# Patient Record
Sex: MALE | Race: WHITE | NOT HISPANIC OR LATINO | Employment: UNEMPLOYED
[De-identification: names, ages, dates, MRNs, and addresses within clinical notes are randomized per-mention and may not be internally consistent; named-entity substitution may affect disease eponyms.]

---

## 2017-06-16 ENCOUNTER — HOSPITAL (OUTPATIENT)
Dept: OTHER | Age: 62
End: 2017-06-16
Attending: HOSPITALIST

## 2017-06-16 LAB
ANALYZER ANC (IANC): NORMAL
ANION GAP SERPL CALC-SCNC: 13 MMOL/L (ref 10–20)
BASOPHILS # BLD: 0 THOUSAND/MCL (ref 0–0.3)
BASOPHILS NFR BLD: 1 %
BUN SERPL-MCNC: 11 MG/DL (ref 6–20)
BUN/CREAT SERPL: 16 (ref 7–25)
CALCIUM SERPL-MCNC: 9.4 MG/DL (ref 8.4–10.2)
CHLORIDE: 97 MMOL/L (ref 98–107)
CO2 SERPL-SCNC: 29 MMOL/L (ref 21–32)
CREAT SERPL-MCNC: 0.68 MG/DL (ref 0.67–1.17)
CRP SERPL-MCNC: 2.8 MG/DL
DIFFERENTIAL METHOD BLD: NORMAL
EOSINOPHIL # BLD: 0.3 THOUSAND/MCL (ref 0.1–0.5)
EOSINOPHIL NFR BLD: 4 %
ERYTHROCYTE [DISTWIDTH] IN BLOOD: 14.9 % (ref 11–15)
ERYTHROCYTE [SEDIMENTATION RATE] IN BLOOD BY WESTERGREN METHOD: 89 MM/HR (ref 0–15)
GLUCOSE BLDC GLUCOMTR-MCNC: 136 MG/DL (ref 65–99)
GLUCOSE BLDC GLUCOMTR-MCNC: 155 MG/DL (ref 65–99)
GLUCOSE SERPL-MCNC: 181 MG/DL (ref 65–99)
HEMATOCRIT: 39.4 % (ref 39–51)
HGB BLD-MCNC: 13.1 GM/DL (ref 13–17)
LYMPHOCYTES # BLD: 1.7 THOUSAND/MCL (ref 1–4)
LYMPHOCYTES NFR BLD: 27 %
MAGNESIUM SERPL-MCNC: 1.9 MG/DL (ref 1.7–2.4)
MCH RBC QN AUTO: 26.4 PG (ref 26–34)
MCHC RBC AUTO-ENTMCNC: 33.2 GM/DL (ref 32–36.5)
MCV RBC AUTO: 79.3 FL (ref 78–100)
MONOCYTES # BLD: 0.6 THOUSAND/MCL (ref 0.3–0.9)
MONOCYTES NFR BLD: 9 %
NEUTROPHILS # BLD: 3.9 THOUSAND/MCL (ref 1.8–7.7)
NEUTROPHILS NFR BLD: 59 %
NEUTS SEG NFR BLD: NORMAL %
PERCENT NRBC: NORMAL
PLATELET # BLD: 329 THOUSAND/MCL (ref 140–450)
POTASSIUM SERPL-SCNC: 4.4 MMOL/L (ref 3.4–5.1)
RBC # BLD: 4.97 MILLION/MCL (ref 4.5–5.9)
SODIUM SERPL-SCNC: 135 MMOL/L (ref 135–145)
WBC # BLD: 6.5 THOUSAND/MCL (ref 4.2–11)

## 2017-06-17 LAB
ANALYZER ANC (IANC): ABNORMAL
ANION GAP SERPL CALC-SCNC: 10 MMOL/L (ref 10–20)
BASOPHILS # BLD: 0 THOUSAND/MCL (ref 0–0.3)
BASOPHILS NFR BLD: 1 %
BUN SERPL-MCNC: 11 MG/DL (ref 6–20)
BUN/CREAT SERPL: 14 (ref 7–25)
CALCIUM SERPL-MCNC: 9 MG/DL (ref 8.4–10.2)
CHLORIDE: 100 MMOL/L (ref 98–107)
CO2 SERPL-SCNC: 29 MMOL/L (ref 21–32)
CREAT SERPL-MCNC: 0.78 MG/DL (ref 0.67–1.17)
DIFFERENTIAL METHOD BLD: ABNORMAL
EOSINOPHIL # BLD: 0.2 THOUSAND/MCL (ref 0.1–0.5)
EOSINOPHIL NFR BLD: 3 %
ERYTHROCYTE [DISTWIDTH] IN BLOOD: 15 % (ref 11–15)
GLUCOSE BLDC GLUCOMTR-MCNC: 116 MG/DL (ref 65–99)
GLUCOSE BLDC GLUCOMTR-MCNC: 152 MG/DL (ref 65–99)
GLUCOSE BLDC GLUCOMTR-MCNC: 209 MG/DL (ref 65–99)
GLUCOSE BLDC GLUCOMTR-MCNC: 248 MG/DL (ref 65–99)
GLUCOSE BLDC GLUCOMTR-MCNC: 261 MG/DL (ref 65–99)
GLUCOSE BLDC GLUCOMTR-MCNC: 90 MG/DL (ref 65–99)
GLUCOSE SERPL-MCNC: 180 MG/DL (ref 65–99)
HEMATOCRIT: 39 % (ref 39–51)
HGB BLD-MCNC: 12.8 GM/DL (ref 13–17)
LYMPHOCYTES # BLD: 1.3 THOUSAND/MCL (ref 1–4)
LYMPHOCYTES NFR BLD: 16 %
MCH RBC QN AUTO: 26.2 PG (ref 26–34)
MCHC RBC AUTO-ENTMCNC: 32.8 GM/DL (ref 32–36.5)
MCV RBC AUTO: 79.9 FL (ref 78–100)
MONOCYTES # BLD: 0.4 THOUSAND/MCL (ref 0.3–0.9)
MONOCYTES NFR BLD: 5 %
NEUTROPHILS # BLD: 6.2 THOUSAND/MCL (ref 1.8–7.7)
NEUTROPHILS NFR BLD: 75 %
NEUTS SEG NFR BLD: ABNORMAL %
PERCENT NRBC: ABNORMAL
PLATELET # BLD: 283 THOUSAND/MCL (ref 140–450)
POTASSIUM SERPL-SCNC: 4.3 MMOL/L (ref 3.4–5.1)
RBC # BLD: 4.88 MILLION/MCL (ref 4.5–5.9)
SODIUM SERPL-SCNC: 135 MMOL/L (ref 135–145)
VANCOMYCIN TROUGH SERPL-MCNC: 21.6 MCG/ML (ref 10–20)
WBC # BLD: 8.1 THOUSAND/MCL (ref 4.2–11)

## 2017-06-18 ENCOUNTER — CHARTING TRANS (OUTPATIENT)
Dept: OTHER | Age: 62
End: 2017-06-18

## 2017-06-18 LAB
GLUCOSE BLDC GLUCOMTR-MCNC: 175 MG/DL (ref 65–99)
GLUCOSE BLDC GLUCOMTR-MCNC: 177 MG/DL (ref 65–99)
GLUCOSE BLDC GLUCOMTR-MCNC: 208 MG/DL (ref 65–99)
GLUCOSE BLDC GLUCOMTR-MCNC: 223 MG/DL (ref 65–99)
GLUCOSE BLDC GLUCOMTR-MCNC: 237 MG/DL (ref 65–99)

## 2017-06-19 LAB
GLUCOSE BLDC GLUCOMTR-MCNC: 150 MG/DL (ref 65–99)
GLUCOSE BLDC GLUCOMTR-MCNC: 158 MG/DL (ref 65–99)
GLUCOSE BLDC GLUCOMTR-MCNC: 183 MG/DL (ref 65–99)
GLUCOSE BLDC GLUCOMTR-MCNC: 199 MG/DL (ref 65–99)
GLUCOSE BLDC GLUCOMTR-MCNC: 260 MG/DL (ref 65–99)

## 2017-06-20 LAB
ANALYZER ANC (IANC): ABNORMAL
ANION GAP SERPL CALC-SCNC: 9 MMOL/L (ref 10–20)
BASOPHILS # BLD: 0.1 THOUSAND/MCL (ref 0–0.3)
BASOPHILS NFR BLD: 1 %
BUN SERPL-MCNC: 11 MG/DL (ref 6–20)
BUN/CREAT SERPL: 12 (ref 7–25)
CALCIUM SERPL-MCNC: 8.9 MG/DL (ref 8.4–10.2)
CHLORIDE: 102 MMOL/L (ref 98–107)
CO2 SERPL-SCNC: 31 MMOL/L (ref 21–32)
CREAT SERPL-MCNC: 0.89 MG/DL (ref 0.67–1.17)
DIFFERENTIAL METHOD BLD: ABNORMAL
EOSINOPHIL # BLD: 0.4 THOUSAND/MCL (ref 0.1–0.5)
EOSINOPHIL NFR BLD: 6 %
ERYTHROCYTE [DISTWIDTH] IN BLOOD: 14.8 % (ref 11–15)
GLUCOSE BLDC GLUCOMTR-MCNC: 191 MG/DL (ref 65–99)
GLUCOSE BLDC GLUCOMTR-MCNC: 201 MG/DL (ref 65–99)
GLUCOSE BLDC GLUCOMTR-MCNC: 211 MG/DL (ref 65–99)
GLUCOSE BLDC GLUCOMTR-MCNC: 228 MG/DL (ref 65–99)
GLUCOSE SERPL-MCNC: 229 MG/DL (ref 65–99)
HEMATOCRIT: 35.5 % (ref 39–51)
HGB BLD-MCNC: 11.5 GM/DL (ref 13–17)
LYMPHOCYTES # BLD: 2.3 THOUSAND/MCL (ref 1–4)
LYMPHOCYTES NFR BLD: 36 %
MCH RBC QN AUTO: 25.8 PG (ref 26–34)
MCHC RBC AUTO-ENTMCNC: 32.4 GM/DL (ref 32–36.5)
MCV RBC AUTO: 79.8 FL (ref 78–100)
MONOCYTES # BLD: 0.5 THOUSAND/MCL (ref 0.3–0.9)
MONOCYTES NFR BLD: 8 %
NEUTROPHILS # BLD: 3.2 THOUSAND/MCL (ref 1.8–7.7)
NEUTROPHILS NFR BLD: 49 %
NEUTS SEG NFR BLD: ABNORMAL %
PERCENT NRBC: ABNORMAL
PLATELET # BLD: 266 THOUSAND/MCL (ref 140–450)
POTASSIUM SERPL-SCNC: 3.9 MMOL/L (ref 3.4–5.1)
RBC # BLD: 4.45 MILLION/MCL (ref 4.5–5.9)
SODIUM SERPL-SCNC: 138 MMOL/L (ref 135–145)
WBC # BLD: 6.4 THOUSAND/MCL (ref 4.2–11)

## 2017-08-01 PROBLEM — M86.172 OSTEOMYELITIS OF ANKLE OR FOOT, ACUTE, LEFT (HCC): Status: ACTIVE | Noted: 2017-08-01

## 2017-09-06 ENCOUNTER — OFFICE VISIT (OUTPATIENT)
Dept: WOUND CARE | Facility: HOSPITAL | Age: 62
End: 2017-09-06
Attending: NURSE PRACTITIONER
Payer: MEDICAID

## 2017-09-06 DIAGNOSIS — L97.321 NON-PRESSURE CHRONIC ULCER OF LEFT ANKLE, LIMITED TO BREAKDOWN OF SKIN (HCC): ICD-10-CM

## 2017-09-06 DIAGNOSIS — E66.8 MODERATE OBESITY: ICD-10-CM

## 2017-09-06 DIAGNOSIS — M14.672 CHARCOT'S JOINT OF ANKLE, LEFT: ICD-10-CM

## 2017-09-06 DIAGNOSIS — L97.509 DIABETIC FOOT ULCER (HCC): ICD-10-CM

## 2017-09-06 DIAGNOSIS — E11.621 DIABETIC FOOT ULCER (HCC): ICD-10-CM

## 2017-09-06 DIAGNOSIS — E11.42: ICD-10-CM

## 2017-09-06 DIAGNOSIS — I10 ESSENTIAL HYPERTENSION, BENIGN: ICD-10-CM

## 2017-09-06 DIAGNOSIS — L97.522 NON-PRESSURE CHRONIC ULCER OF OTHER PART OF LEFT FOOT WITH FAT LAYER EXPOSED (HCC): Primary | ICD-10-CM

## 2017-09-06 PROCEDURE — 99215 OFFICE O/P EST HI 40 MIN: CPT

## 2017-09-06 NOTE — PROGRESS NOTES
Progress Note Details  Patient Name: Amos Castillo  Date: 9/6/2017   Patient Number: [id number] Physician / Extender: Tank Ortega   Patient YOB: 1955 Facility: [facility]    Chief Complaint  This information was obtained f 9/6/17-Initial visit-61yo CM presents to wound care clinic for left heel and left ankle wound evaluation and management. PMH of HTN, DM type 2, Neuropathy, Osteomyelitis (resolved), hypertriglyceridemia, charcots foot and obesity.  Pt stated that left heel Cancer - No History, Diabetes - Father, Heart Disease - No History, Hypertension - No History, Kidney Disease - No History, Lung Disease - Mother, Mental Illness - No History, Stroke - Mother, Thyroid Problems - Mother, Tuberculosis - No History, Seizures Cardiovascular (Central/Peripheral): Dyspnea on Exertion, Intermittent Claudication, Lower extremity (leg) resting pain, Nocturnal dyspnea  Gastrointestinal (GI): Nausea / Vomiting / Diarrhea (N/V/D), Loss of Appetite, Difficulty Swallowing, Stomach/abdomi Normal respiratory effort ,without use of accessory muscles. Clear bilaterally, free of adventitious sounds. .     Cardiovascular:  RRR, S1, S2, No murmur, rubs or gallops. . Triphasic signals bilateral DP and PT. Robi LE edema L>R.      Gastrointestinal (GI): The periwound skin exhibited: Edema, Induration, Callus, Moist, Maceration, Erythema. The periwound skin did not exhibit: Excoriation, Crepitus, Fluctuance, Friable, Rash, Atrophie Yeni, Cyanosis, Ecchymosis, Hemosiderosis, Pallor, Rubor.  The temperatur (Encounter Diagnosis) L97.522 - Non-pressure chronic ulcer of other part of left foot with fat layer exposed  (Encounter Diagnosis) L97.321 - Non-pressure chronic ulcer of left ankle limited to breakdown of skin  (Encounter Diagnosis) E11.621 - Type 2 diab

## 2017-09-12 ENCOUNTER — APPOINTMENT (OUTPATIENT)
Dept: WOUND CARE | Facility: HOSPITAL | Age: 62
End: 2017-09-12
Attending: NURSE PRACTITIONER
Payer: MEDICAID

## 2017-09-13 ENCOUNTER — OFFICE VISIT (OUTPATIENT)
Dept: WOUND CARE | Facility: HOSPITAL | Age: 62
End: 2017-09-13
Attending: NURSE PRACTITIONER
Payer: MEDICAID

## 2017-09-13 DIAGNOSIS — L97.522 NON-PRESSURE CHRONIC ULCER OF OTHER PART OF LEFT FOOT WITH FAT LAYER EXPOSED (HCC): Primary | ICD-10-CM

## 2017-09-13 DIAGNOSIS — L97.509 DIABETIC FOOT ULCER (HCC): ICD-10-CM

## 2017-09-13 DIAGNOSIS — E11.621 DIABETIC FOOT ULCER (HCC): ICD-10-CM

## 2017-09-13 DIAGNOSIS — L97.321 NON-PRESSURE CHRONIC ULCER OF LEFT ANKLE, LIMITED TO BREAKDOWN OF SKIN (HCC): ICD-10-CM

## 2017-09-13 PROCEDURE — 97597 DBRDMT OPN WND 1ST 20 CM/<: CPT

## 2017-09-13 PROCEDURE — 97598 DBRDMT OPN WND ADDL 20CM/<: CPT

## 2017-09-13 NOTE — PROGRESS NOTES
Progress Note Details  Patient Name: John Ferris  Date: 9/13/2017   Patient Number: [id number] Physician / Extender: Bridgette Nunez   Patient YOB: 1955 Facility: [facility]    Chief Complaint  This information was obtained 9/6/17-Initial visit-61yo CM presents to wound care clinic for left heel and left ankle wound evaluation and management. PMH of HTN, DM type 2, Neuropathy, Osteomyelitis (resolved), hypertriglyceridemia, charcots foot and obesity.  Pt stated that left heel 9/13/17-Pt admitted [facility] after he left wound care center last visit and ws discharged 3 days later. Pt did not bring discharge documents with him.  9/6/17-Wound culture showed staph aureus and corneybacterium while he was at [facility] and pt stated Cardiovascular (Central/Peripheral): Dyspnea on Exertion, Intermittent Claudication, Lower extremity (leg) resting pain  Gastrointestinal (GI): Nausea / Vomiting / Diarrhea (N/V/D), Difficulty Swallowing  Genitourinary (): Frequency  Musculoskeletal: Ass The periwound skin exhibited: Edema, Induration, Erythema. The periwound skin did not exhibit: Dry/Scaly, Moist, Maceration. The temperature of the periwound skin is Warm. Periwound skin does not exhibit signs or symptoms of infection. Local Pulse is Weak. Capillary Refill: < 3 seconds   Erythema: No   Dependent Rubor: No   Hyperpigmentation: Yes   Lipodermatosclerosis: No  Right Extremity colors, hair growth, and conditions:   Extremity Color: Pigmented   Hair Growth on Extremity: Yes   Temperature of Extre Initial Anesthetic Order: Apply lidocaine to wound bed on all future wound center visits during preparation for physician exam if wound bed contains fibrin or eschar. 4% Topical Lidocaine    Wound Cleansing & Dressings  May shower with protection.   Cleans

## 2017-09-20 ENCOUNTER — APPOINTMENT (OUTPATIENT)
Dept: WOUND CARE | Facility: HOSPITAL | Age: 62
End: 2017-09-20
Attending: NURSE PRACTITIONER
Payer: MEDICAID

## 2018-02-19 PROBLEM — T84.498A EXPOSED ORTHOPAEDIC HARDWARE: Status: ACTIVE | Noted: 2018-02-19

## 2018-02-19 PROBLEM — T84.498A EXPOSED ORTHOPAEDIC HARDWARE (HCC): Status: ACTIVE | Noted: 2018-02-19

## 2018-02-19 PROCEDURE — 81001 URINALYSIS AUTO W/SCOPE: CPT | Performed by: FAMILY MEDICINE

## 2018-04-18 PROBLEM — M86.172 OSTEOMYELITIS OF ANKLE OR FOOT, ACUTE, LEFT (HCC): Status: RESOLVED | Noted: 2017-08-01 | Resolved: 2018-04-18

## 2018-04-18 PROBLEM — T84.498A EXPOSED ORTHOPAEDIC HARDWARE: Status: RESOLVED | Noted: 2018-02-19 | Resolved: 2018-04-18

## 2018-04-18 PROBLEM — T84.498A EXPOSED ORTHOPAEDIC HARDWARE (HCC): Status: RESOLVED | Noted: 2018-02-19 | Resolved: 2018-04-18

## 2018-04-25 ENCOUNTER — OFFICE VISIT (OUTPATIENT)
Dept: WOUND CARE | Facility: HOSPITAL | Age: 63
End: 2018-04-25
Attending: NURSE PRACTITIONER
Payer: MEDICAID

## 2018-04-25 DIAGNOSIS — T81.31XA DISRUPTION OF EXTERNAL OPERATION (SURGICAL) WOUND, NOT ELSEWHERE CLASSIFIED, INITIAL ENCOUNTER: ICD-10-CM

## 2018-04-25 DIAGNOSIS — E66.8 OTHER OBESITY: ICD-10-CM

## 2018-04-25 DIAGNOSIS — E11.621 DIABETIC FOOT ULCERS (HCC): ICD-10-CM

## 2018-04-25 DIAGNOSIS — L97.509 DIABETIC FOOT ULCERS (HCC): ICD-10-CM

## 2018-04-25 DIAGNOSIS — L97.521 NON-PRESSURE CHRONIC ULCER OF OTHER PART OF LEFT FOOT LIMITED TO BREAKDOWN OF SKIN (HCC): Primary | ICD-10-CM

## 2018-04-25 DIAGNOSIS — E11.42 DIABETIC POLYNEUROPATHY (HCC): ICD-10-CM

## 2018-04-25 DIAGNOSIS — R60.9 EDEMA: ICD-10-CM

## 2018-04-25 PROCEDURE — 99214 OFFICE O/P EST MOD 30 MIN: CPT

## 2018-04-25 PROCEDURE — 97597 DBRDMT OPN WND 1ST 20 CM/<: CPT

## 2018-04-27 NOTE — PROGRESS NOTES
Progress Note Details  Patient Name: Rik Galdamez  Date: 4/25/2018   Patient Number: [id number] Physician / Extender: Carol Brooke   Patient YOB: 1955 Facility: [facility]    Chief Complaint  This information was obtained Wounds do not appear infected. No malodor to drainage. Triphasic signals BLE DP and PT. Unable to get hallux pulse to do beside TBI. BLE edema L>R. Pt reported edema after surgery.  Enluxtra to left lower leg incision area, collagen and hydrofera to plantar 9/6/17-Initial visit-63yo CM presents to wound care clinic for left heel and left ankle wound evaluation and management. PMH of HTN, DM type 2, Neuropathy, Osteomyelitis (resolved), hypertriglyceridemia, charcots foot and obesity.  Pt stated that left heel 9/13/17-Pt admitted [facility] after he left wound care center last visit and ws discharged 3 days later. Pt did not bring discharge documents with him.  9/6/17-Wound culture showed staph aureus and corneybacterium while he was at [facility] and pt stated Patient has a medical history of:  Charcot's joint disease  hypokalemia  Type II Diabetes  diabetic peripheral neuropathy  restless leg syndrome  Osteopenia  Typ II diabetes  Obesity  Hypertriglyceridemia  Hypertension    Surgical History  This information Objective    Constitutional  BP WNL after rechecking. Pulse RRR. RR within normal limits. Afebrile. Obesity. Alert, calm, well developed, in no apparent distress.  Height/Length: 75 in (190.5 cm), Weight: 270 lbs (122.73 kgs), BMI: 33.7, Temperature: 98.1 ° The periwound skin exhibited: Edema, Callus, Dry/Scaly, Hemosiderosis.  The periwound skin did not exhibit: Brawny Induration, Excoriation, Induration, Crepitus, Fluctuance, Friable, Rash, Moist, Maceration, Atrophie Yeni, Cyanosis, Ecchymosis, Erythema, Left Extremity colors, hair growth, and conditions:   Extremity Color: Pigmented   Hair Growth on Extremity: Yes   Temperature of Extremity: Warm   Capillary Refill: < 3 seconds   Erythema: No   Dependent Rubor: No   Hyperpigmentation: No   Lipodermatoscle Wound #3 (Diabetic Ulcer) is located on the left, plantar heel. A selective debridement with a total area debrided of 8.25 sq cm was performed by Lucien Luna NP. to remove devitalized tissue: biofilm, callus, and fibrin.  The following instrument(s) Avoid prolonged standing in one place. Elevate leg(s)as much as possible. Off-Loading  Darco shoe with peg insert    Wound #4 Left, Medial, Anterior Ankle     Wound Cleansing & Dressings  May shower with protection.   Cleanse with saline or wound cleans

## 2018-05-02 ENCOUNTER — OFFICE VISIT (OUTPATIENT)
Dept: WOUND CARE | Facility: HOSPITAL | Age: 63
End: 2018-05-02
Attending: NURSE PRACTITIONER
Payer: MEDICAID

## 2018-05-02 DIAGNOSIS — E11.621 DIABETIC FOOT ULCERS (HCC): ICD-10-CM

## 2018-05-02 DIAGNOSIS — L97.509 DIABETIC FOOT ULCERS (HCC): ICD-10-CM

## 2018-05-02 DIAGNOSIS — T81.31XA DISRUPTION OF EXTERNAL OPERATION (SURGICAL) WOUND, NOT ELSEWHERE CLASSIFIED, INITIAL ENCOUNTER: ICD-10-CM

## 2018-05-02 DIAGNOSIS — L97.521 NON-PRESSURE CHRONIC ULCER OF OTHER PART OF LEFT FOOT LIMITED TO BREAKDOWN OF SKIN (HCC): Primary | ICD-10-CM

## 2018-05-02 PROCEDURE — 99214 OFFICE O/P EST MOD 30 MIN: CPT

## 2018-05-03 NOTE — PROGRESS NOTES
Progress Note Details  Patient Name: Mateo Rodrigues  Date: 5/2/2018   Patient Number: [id number] Physician / Extender: Zafar Bailey   Patient YOB: 1955 Facility: [facility]    Chief Complaint  This information was obtained f 5/2/18-here for LLE wound management. Afebrile and denies any pain. Able to do dressing changes as per treatment plan.  Explained to pt that [facility] did not have any arterial studies done last year after we faxed MR request. Pt wll bring in copy next week and s BP Elevated, on antihypertensive meds. Pulse RRR. RR within normal limits. Afebrile. Obesity. Alert, calm, well developed, in no apparent distress.  Height/Length: 75 in (190.5 cm), Weight: 270 lbs (122.73 kgs), BMI: 33.7, Temperature: 98.7 °F (37.06 °C), P Wound #4 Left, Medial, Anterior Ankle is a chronic Full Thickness Surgical Wound and has received a status of Not Healed.  Subsequent wound encounter measurements are 2.8cm length x 0.4cm width x 0.1cm depth, with an area of 1.12 sq cm and a volume of 0.112 (Encounter Diagnosis) T81.31XD - Disruption of external operation (surgical) wound, not elsewhere classified, subsequent encounter  (Encounter Diagnosis) L97.521 - Non-pressure chronic ulcer of other part of left foot limited to breakdown of skin  (Encount Perfusion assessed by doppler. - 4/25/18-Triphasic signals BLE DP and PT  Last sharp debridement date: - 4/25/18  Last A1C date and value: - 2/8/18 9.3-PCP manages and pt adherent to ADA diet. Last albumin date and value: - 4/5/18 3.4 and 7. 2TP        63y

## 2018-05-07 ENCOUNTER — APPOINTMENT (OUTPATIENT)
Dept: WOUND CARE | Facility: HOSPITAL | Age: 63
End: 2018-05-07
Attending: NURSE PRACTITIONER
Payer: MEDICAID

## 2018-05-09 ENCOUNTER — OFFICE VISIT (OUTPATIENT)
Dept: WOUND CARE | Facility: HOSPITAL | Age: 63
End: 2018-05-09
Attending: NURSE PRACTITIONER
Payer: MEDICAID

## 2018-05-09 DIAGNOSIS — T81.31XA DISRUPTION OF EXTERNAL OPERATION (SURGICAL) WOUND, NOT ELSEWHERE CLASSIFIED, INITIAL ENCOUNTER: ICD-10-CM

## 2018-05-09 DIAGNOSIS — L97.521 NON-PRESSURE CHRONIC ULCER OF OTHER PART OF LEFT FOOT LIMITED TO BREAKDOWN OF SKIN (HCC): Primary | ICD-10-CM

## 2018-05-09 DIAGNOSIS — L97.509 DIABETIC FOOT ULCERS (HCC): ICD-10-CM

## 2018-05-09 DIAGNOSIS — E11.621 DIABETIC FOOT ULCERS (HCC): ICD-10-CM

## 2018-05-09 PROCEDURE — 97597 DBRDMT OPN WND 1ST 20 CM/<: CPT

## 2018-05-09 NOTE — PROGRESS NOTES
Progress Note Details  Patient Name: Renee Juan  Date: 5/9/2018   Patient Number: [id number] Physician / Extender: Ines Gallagher   Patient YOB: 1955 Facility: [facility]    Chief Complaint  This information was obtained f ZCB4q-2/8/18 9.3-managed by PCP and pt stated that he is adherent to ADA diet and monitors glucose levels at home. 5/2/18-here for LLE wound management. Afebrile and denies any pain. Able to do dressing changes as per treatment plan.  Explained to pt th Musculoskeletal: Joint Pain (Left ankle), Assistive Devices  Integumentary (Hair/Skin/Nails): Itching  Neurological: Dizziness, Weakness  Hematologic/Lymphatic: Bleeding / Clotting Disorders  Psychiatric: Anxiety, Depression, Mental Illness  Prior Wound Hi The periwound skin exhibited: Edema, Dry/Scaly, Hemosiderosis.  The periwound skin did not exhibit: Brawny Induration, Excoriation, Induration, Callus, Crepitus, Fluctuance, Friable, Rash, Moist, Maceration, Atrophie Yeni, Cyanosis, Ecchymosis, Erythema, Refusing RLE tubigrip    Additional Information  BP (mmHg):: 132/64mmhg manual check-EF  Left Posterior Tibial Pulse: Triphasic  Right Posterior Tibial Pulse: Triphasic  Left Dorsalis Pedis Pulse: Triphasic  Right Dorsalis Pedis Pulse: Triphasic        Wou Cleanse with saline or wound cleanser  Collagen - Endoform  Hydrofera ready  Kerlix  Paper tape  Change dressing every: - Saturday and as needed if soiled    Compression Therapy:  Tubigrip - 2 layer high BLE  Avoid prolonged standing in one place.   Elevate Signed By:  Date:    Sundeep FRANCISCO, FNP-BC 05/09/2018 18:42:49   Entered By: Sundeep Chandler on 05/09/2018 18:42:21

## 2018-05-16 ENCOUNTER — APPOINTMENT (OUTPATIENT)
Dept: WOUND CARE | Facility: HOSPITAL | Age: 63
End: 2018-05-16
Attending: NURSE PRACTITIONER
Payer: MEDICAID

## 2018-05-22 ENCOUNTER — OFFICE VISIT (OUTPATIENT)
Dept: WOUND CARE | Facility: HOSPITAL | Age: 63
End: 2018-05-22
Attending: NURSE PRACTITIONER
Payer: MEDICAID

## 2018-05-22 DIAGNOSIS — L97.521 NON-PRESSURE CHRONIC ULCER OF OTHER PART OF LEFT FOOT LIMITED TO BREAKDOWN OF SKIN (HCC): Primary | ICD-10-CM

## 2018-05-22 DIAGNOSIS — E11.621 DIABETIC FOOT ULCERS (HCC): ICD-10-CM

## 2018-05-22 DIAGNOSIS — T81.31XA DISRUPTION OF EXTERNAL OPERATION (SURGICAL) WOUND, NOT ELSEWHERE CLASSIFIED, INITIAL ENCOUNTER: ICD-10-CM

## 2018-05-22 DIAGNOSIS — L97.509 DIABETIC FOOT ULCERS (HCC): ICD-10-CM

## 2018-05-22 PROCEDURE — 97597 DBRDMT OPN WND 1ST 20 CM/<: CPT

## 2018-05-22 NOTE — PROGRESS NOTES
Progress Note Details  Patient Name: Malva Dakins  Date: 5/22/2018   Patient Number: [id number] Physician / Extender: Rozina Good   Patient YOB: 1955 Facility: [facility]    Chief Complaint  This information was obtained IMA7h-5/8/18 9.3-managed by PCP and pt stated that he is adherent to ADA diet and monitors glucose levels at home. 5/2/18-here for LLE wound management. Afebrile and denies any pain. Able to do dressing changes as per treatment plan.  Explained to pt th Gastrointestinal (GI): Change in Bowel Habits, Nausea / Vomiting / Diarrhea (N/V/D), Difficulty Swallowing  Genitourinary (): Frequency  Musculoskeletal: Joint Pain (Left ankle), Assistive Devices  Integumentary (Hair/Skin/Nails): Itching  Neurological: The periwound skin exhibited: Edema, Dry/Scaly, Hemosiderosis.  The periwound skin did not exhibit: Brawny Induration, Excoriation, Induration, Callus, Crepitus, Fluctuance, Friable, Rash, Moist, Maceration, Atrophie Yeni, Cyanosis, Ecchymosis, Erythema, Right Dorsalis Pedis Pulse: Triphasic        Wound decreased in size and no s/s of infection.    Assessment    Active Problems    ICD-10  (Encounter Diagnosis) T81.31XD - Disruption of external operation (surgical) wound, not elsewhere classified, subsequen Avoid prolonged standing in one place. Elevate leg(s)as much as possible. Off-Loading  Knee roller - Use knee roller that you used in the past after surgery.     Darco shoe with peg insert    Wound #4 Left, Medial, Anterior Ankle     Wound Cleansing & D Entered By: Kate Rebollar on 05/22/2018 14:30:56

## 2018-05-29 ENCOUNTER — APPOINTMENT (OUTPATIENT)
Dept: WOUND CARE | Facility: HOSPITAL | Age: 63
End: 2018-05-29
Attending: NURSE PRACTITIONER
Payer: MEDICAID

## 2018-05-30 ENCOUNTER — OFFICE VISIT (OUTPATIENT)
Dept: WOUND CARE | Facility: HOSPITAL | Age: 63
End: 2018-05-30
Attending: NURSE PRACTITIONER
Payer: MEDICAID

## 2018-05-30 DIAGNOSIS — L97.509 DIABETIC FOOT ULCERS (HCC): ICD-10-CM

## 2018-05-30 DIAGNOSIS — T81.31XA DISRUPTION OF EXTERNAL OPERATION (SURGICAL) WOUND, NOT ELSEWHERE CLASSIFIED, INITIAL ENCOUNTER: ICD-10-CM

## 2018-05-30 DIAGNOSIS — E11.621 DIABETIC FOOT ULCERS (HCC): ICD-10-CM

## 2018-05-30 DIAGNOSIS — L97.521 NON-PRESSURE CHRONIC ULCER OF OTHER PART OF LEFT FOOT LIMITED TO BREAKDOWN OF SKIN (HCC): Primary | ICD-10-CM

## 2018-05-30 PROCEDURE — 97597 DBRDMT OPN WND 1ST 20 CM/<: CPT

## 2018-05-30 NOTE — PROGRESS NOTES
Progress Note Details  Patient Name: Ramiro White  Date: 5/30/2018   Patient Number: [id number] Physician / Extender: Penelope Zimmer   Patient YOB: 1955 Facility: [facility]    Chief Complaint  This information was obtained 5/2/18-here for LLE wound management. Afebrile and denies any pain. Able to do dressing changes as per treatment plan.  Explained to pt that [facility] did not have any arterial studies done last year after we faxed MR request. Pt wll bring in copy next week and s Gastrointestinal (GI): Change in Bowel Habits, Nausea / Vomiting / Diarrhea (N/V/D), Difficulty Swallowing  Genitourinary (): Frequency  Musculoskeletal: Joint Pain (Left ankle), Assistive Devices  Integumentary (Hair/Skin/Nails): Dryness, Itching  Neuro The periwound skin exhibited: Edema, Dry/Scaly, Hemosiderosis.  The periwound skin did not exhibit: Brawny Induration, Excoriation, Induration, Callus, Crepitus, Fluctuance, Friable, Rash, Moist, Maceration, Atrophie Yeni, Cyanosis, Ecchymosis, Erythema, Active Problems    ICD-10  (Encounter Diagnosis) T81.31XD - Disruption of external operation (surgical) wound, not elsewhere classified, subsequent encounter  (Encounter Diagnosis) L97.521 - Non-pressure chronic ulcer of other part of left foot limited to Knee roller - Use knee roller-Bring in next visit. Darco shoe with peg insert    Additional Orders: Follow-Up Appointments  Return Appointment in 1 week.     Misc/Additional Orders  Increase dietary protein  Moisturizer - Dry skin  S/S of Infection    C

## 2018-06-04 ENCOUNTER — HOSPITAL ENCOUNTER (INPATIENT)
Facility: HOSPITAL | Age: 63
LOS: 3 days | Discharge: HOME | DRG: 603 | End: 2018-06-07
Attending: INTERNAL MEDICINE | Admitting: INTERNAL MEDICINE
Payer: MEDICAID

## 2018-06-04 PROCEDURE — 87077 CULTURE AEROBIC IDENTIFY: CPT | Performed by: PHYSICIAN ASSISTANT

## 2018-06-04 PROCEDURE — 87040 BLOOD CULTURE FOR BACTERIA: CPT | Performed by: PHYSICIAN ASSISTANT

## 2018-06-04 PROCEDURE — 87150 DNA/RNA AMPLIFIED PROBE: CPT | Performed by: PHYSICIAN ASSISTANT

## 2018-06-04 RX ORDER — MELATONIN
325
COMMUNITY
End: 2019-09-13

## 2018-06-05 ENCOUNTER — APPOINTMENT (OUTPATIENT)
Dept: MRI IMAGING | Facility: HOSPITAL | Age: 63
DRG: 603 | End: 2018-06-05
Attending: INTERNAL MEDICINE
Payer: MEDICAID

## 2018-06-05 PROBLEM — L03.90 CELLULITIS: Status: ACTIVE | Noted: 2018-06-05

## 2018-06-05 PROCEDURE — 85025 COMPLETE CBC W/AUTO DIFF WBC: CPT | Performed by: HOSPITALIST

## 2018-06-05 PROCEDURE — 82962 GLUCOSE BLOOD TEST: CPT

## 2018-06-05 PROCEDURE — A9576 INJ PROHANCE MULTIPACK: HCPCS | Performed by: HOSPITALIST

## 2018-06-05 PROCEDURE — 83036 HEMOGLOBIN GLYCOSYLATED A1C: CPT | Performed by: HOSPITALIST

## 2018-06-05 PROCEDURE — 84132 ASSAY OF SERUM POTASSIUM: CPT | Performed by: INTERNAL MEDICINE

## 2018-06-05 PROCEDURE — 73219 MRI UPPER EXTREMITY W/DYE: CPT | Performed by: INTERNAL MEDICINE

## 2018-06-05 PROCEDURE — 83735 ASSAY OF MAGNESIUM: CPT | Performed by: HOSPITALIST

## 2018-06-05 PROCEDURE — 80048 BASIC METABOLIC PNL TOTAL CA: CPT | Performed by: HOSPITALIST

## 2018-06-05 RX ORDER — HYDROCODONE BITARTRATE AND ACETAMINOPHEN 10; 325 MG/1; MG/1
1-2 TABLET ORAL EVERY 4 HOURS PRN
Status: DISCONTINUED | OUTPATIENT
Start: 2018-06-05 | End: 2018-06-07

## 2018-06-05 RX ORDER — ROSUVASTATIN CALCIUM 20 MG/1
20 TABLET, COATED ORAL NIGHTLY
Status: DISCONTINUED | OUTPATIENT
Start: 2018-06-05 | End: 2018-06-07

## 2018-06-05 RX ORDER — ONDANSETRON 2 MG/ML
4 INJECTION INTRAMUSCULAR; INTRAVENOUS EVERY 6 HOURS PRN
Status: DISCONTINUED | OUTPATIENT
Start: 2018-06-05 | End: 2018-06-05

## 2018-06-05 RX ORDER — LOSARTAN POTASSIUM 100 MG/1
100 TABLET ORAL DAILY
Status: DISCONTINUED | OUTPATIENT
Start: 2018-06-06 | End: 2018-06-07

## 2018-06-05 RX ORDER — DEXTROSE MONOHYDRATE 25 G/50ML
50 INJECTION, SOLUTION INTRAVENOUS
Status: DISCONTINUED | OUTPATIENT
Start: 2018-06-05 | End: 2018-06-07

## 2018-06-05 RX ORDER — POLYETHYLENE GLYCOL 3350 17 G/17G
17 POWDER, FOR SOLUTION ORAL DAILY PRN
Status: DISCONTINUED | OUTPATIENT
Start: 2018-06-05 | End: 2018-06-07

## 2018-06-05 RX ORDER — CEFAZOLIN SODIUM/WATER 2 G/20 ML
2 SYRINGE (ML) INTRAVENOUS EVERY 8 HOURS
Status: DISCONTINUED | OUTPATIENT
Start: 2018-06-05 | End: 2018-06-07

## 2018-06-05 RX ORDER — ASPIRIN 325 MG
325 TABLET ORAL
Status: DISCONTINUED | OUTPATIENT
Start: 2018-06-05 | End: 2018-06-07

## 2018-06-05 RX ORDER — GABAPENTIN 400 MG/1
800 CAPSULE ORAL 3 TIMES DAILY
Status: DISCONTINUED | OUTPATIENT
Start: 2018-06-05 | End: 2018-06-07

## 2018-06-05 RX ORDER — TRAMADOL HYDROCHLORIDE 50 MG/1
50 TABLET ORAL EVERY 6 HOURS PRN
Status: DISCONTINUED | OUTPATIENT
Start: 2018-06-05 | End: 2018-06-07

## 2018-06-05 RX ORDER — ACETAMINOPHEN 325 MG/1
650 TABLET ORAL EVERY 6 HOURS PRN
Status: DISCONTINUED | OUTPATIENT
Start: 2018-06-05 | End: 2018-06-07

## 2018-06-05 RX ORDER — POTASSIUM CHLORIDE 20 MEQ/1
40 TABLET, EXTENDED RELEASE ORAL EVERY 4 HOURS
Status: COMPLETED | OUTPATIENT
Start: 2018-06-05 | End: 2018-06-05

## 2018-06-05 RX ORDER — ONDANSETRON 4 MG/1
4 TABLET, ORALLY DISINTEGRATING ORAL EVERY 6 HOURS PRN
Status: DISCONTINUED | OUTPATIENT
Start: 2018-06-05 | End: 2018-06-07

## 2018-06-05 RX ORDER — HEPARIN SODIUM 5000 [USP'U]/ML
5000 INJECTION, SOLUTION INTRAVENOUS; SUBCUTANEOUS EVERY 8 HOURS SCHEDULED
Status: DISCONTINUED | OUTPATIENT
Start: 2018-06-05 | End: 2018-06-07

## 2018-06-05 RX ORDER — SODIUM PHOSPHATE, DIBASIC AND SODIUM PHOSPHATE, MONOBASIC 7; 19 G/133ML; G/133ML
1 ENEMA RECTAL ONCE AS NEEDED
Status: DISCONTINUED | OUTPATIENT
Start: 2018-06-05 | End: 2018-06-07

## 2018-06-05 RX ORDER — BISACODYL 10 MG
10 SUPPOSITORY, RECTAL RECTAL
Status: DISCONTINUED | OUTPATIENT
Start: 2018-06-05 | End: 2018-06-07

## 2018-06-05 RX ORDER — MELATONIN
325
Status: DISCONTINUED | OUTPATIENT
Start: 2018-06-06 | End: 2018-06-07

## 2018-06-05 RX ORDER — METOCLOPRAMIDE HYDROCHLORIDE 5 MG/ML
10 INJECTION INTRAMUSCULAR; INTRAVENOUS EVERY 8 HOURS PRN
Status: DISCONTINUED | OUTPATIENT
Start: 2018-06-05 | End: 2018-06-07

## 2018-06-05 RX ORDER — DOXEPIN HYDROCHLORIDE 50 MG/1
1 CAPSULE ORAL
Status: DISCONTINUED | OUTPATIENT
Start: 2018-06-05 | End: 2018-06-07

## 2018-06-05 RX ORDER — ONDANSETRON 2 MG/ML
4 INJECTION INTRAMUSCULAR; INTRAVENOUS EVERY 6 HOURS PRN
Status: DISCONTINUED | OUTPATIENT
Start: 2018-06-05 | End: 2018-06-07

## 2018-06-05 NOTE — PLAN OF CARE
A/Ox4  On RA  No tele  C/o mild pain in L foot, declining intervention  VSS, afebrile   Accucheck QID  Wound care consulted  ID consulted   1 time dose of Vanco  L foot dressing clean, dry, intact- changed  Will continue to monitor     0648: Dr. Ivet Sterling ret

## 2018-06-05 NOTE — H&P
[address] [place] [address] Patient Status:  Inpatient    1955 MRN [id number]   [facility] 3NE-A Attending Tiago Leal MD   [facility] Day # 1 PCP Thang Mulligan MD     Cc: foot wound    History of Present Illness: ([facility] 75.) 8/1/2017   • OSTEOPENIA    • Other and unspecified hyperlipidemia    • RLS (restless legs syndrome)    • Type II or unspecified type diabetes mellitus without mention of complication, not stated as uncontrolled      Past Surgical History:  No date: H by mouth 3 (three) times daily. Take 400mg in am, 400mg in afternoon and 800mg in evening ) Disp: 360 capsule Rfl: 3 6/4/2018 at Unknown time   losartan 100 MG Oral Tab Take 1 tablet (100 mg total) by mouth daily.  Disp: 90 tablet Rfl: 3 6/4/2018 at Unknown positives and negatives are noted above in the the HPI.       Physical Exam:     Vital signs: /64 (BP Location: Right arm)   Pulse 71   Temp 99 °F (37.2 °C) (Oral)   Resp 18   Ht 6' 3\" (1.905 m)   Wt 265 lb (120.2 kg)   SpO2 100%   BMI 33.12 kg/m² formation raises concern for superimposed osteomyelitis.          Assessment / Plan:      Marge Brito is a 59year old male with PMH significant for T2DM with associated peripheral neuropathy and Charcot foot, HTN  presenting to [facility] for left arm)   Pulse 62   Temp 99.2 °F (37.3 °C) (Oral)   Resp 18   Ht 6' 3\" (1.905 m)   Wt 265 lb (120.2 kg)   SpO2 98%   BMI 33.12 kg/m²   Gen: No acute distress, alert and oriented, obese  Pulm: Lungs clear bilaterally, good inspiratory effort   CV:  nL S1/S2

## 2018-06-05 NOTE — PROGRESS NOTES
Ortho    Patient evaluated. Does not need acute surgery today.   Dr. Eulalia Galo will see tomorrow after MRI for full consult

## 2018-06-05 NOTE — PROGRESS NOTES
[facility] dosing service    Initial Pharmacokinetic Consult for Vancomycin Dosing     Fior Garcia is a 59year old male who is being treated for cellulitis.   Pharmacy has been asked to dose Vancomycin by Dr. Eddy Alonso    He is allergic to coffe

## 2018-06-05 NOTE — PROGRESS NOTES
NURSING ADMISSION NOTE      Patient admitted via Cart  Oriented to room. Safety precautions initiated. Bed in low position. Call light in reach.     Admission navigator completed   MD paged for admitting orders- awaiting response

## 2018-06-05 NOTE — CONSULTS
INFECTIOUS DISEASE [address] Patient Status:  Inpatient    1955 MRN [id number]   [facility] 3NE-A Attending Taty Diop MD   [address] Day # 1 PCP Hiram Gauthier MD • Type II or unspecified type diabetes mellitus without mention of complication, not stated as uncontrolled      Past Surgical History:  No date: HEMORRHOIDECTOMY  No date: OPEN RX PATELLA FX  No date: OPEN RX PERIARTIC FX/DISLOC ELBOW  2011, 2012: OTHER S A comprehensive 10 point review of systems was completed. Pertinent positives and negatives noted in the the HPI. Physical Exam:    General: No acute distress. Alert and oriented x 3.   Vital signs: Temp:  [98.4 °F (36.9 °C)-102.4 °F (39.1 °C)] 98.4 ° 2. HO neutropnenia while on vancomycin, cefepime, follow [address] 143 MD Jose  [address] [place] [address]  [phone]

## 2018-06-05 NOTE — PLAN OF CARE
AOx4  VSS, on RA  QID accu check  LLE cellulitis   IV Cefazolin  Infectious Disease consulted  LLE wound  MRI of LLE- pending    Diabetes/Glucose Control    • Glucose maintained within prescribed range Progressing        Patient/Family Goals    • Patient/F

## 2018-06-05 NOTE — PAYOR COMM NOTE
--------------  ADMISSION REVIEW     Payor: [address] [employer] #:  [id number]  Authorization Number: N/A    Admit date: 6/4/18  Admit time: 2311       Admitting Physician: Pablo Sosa MD  Attending Physician:  Fannie Rubio GM/20ML premix IV syringe 2 g     Date Action Dose Route User    6/5/2018 0840 Given 2 g Intravenous Jenna Ken, RN      gabapentin (NEURONTIN) cap 800 mg     Date Action Dose Route User    6/5/2018 0839 Given 800 mg Oral CINTHYA Herzog

## 2018-06-06 ENCOUNTER — APPOINTMENT (OUTPATIENT)
Dept: WOUND CARE | Facility: HOSPITAL | Age: 63
End: 2018-06-06
Attending: NURSE PRACTITIONER
Payer: MEDICAID

## 2018-06-06 PROCEDURE — 80048 BASIC METABOLIC PNL TOTAL CA: CPT | Performed by: PHYSICIAN ASSISTANT

## 2018-06-06 PROCEDURE — 99214 OFFICE O/P EST MOD 30 MIN: CPT

## 2018-06-06 PROCEDURE — 85027 COMPLETE CBC AUTOMATED: CPT | Performed by: PHYSICIAN ASSISTANT

## 2018-06-06 PROCEDURE — 82962 GLUCOSE BLOOD TEST: CPT

## 2018-06-06 RX ORDER — CEPHALEXIN 500 MG/1
1000 CAPSULE ORAL 3 TIMES DAILY
Qty: 90 CAPSULE | Refills: 0 | Status: SHIPPED | OUTPATIENT
Start: 2018-06-06 | End: 2018-06-21

## 2018-06-06 NOTE — WOUND PROGRESS NOTE
[facility]  Report of Inpatient Wound Care Consultation    Kylee Lipa Patient Status:  Inpatient    1955 MRN [id number]   [address] [place] Attending Nirali Franks MD   [facility] Day # 2 PCP Christy Pradhan MD     [facility] for [facility]  06/06/2018   CA 8.4 06/06/2018   PGLU 144 06/06/2018         Impression: 1. Left  Heel      Measures 1.2 cm x 1 cm x 0 depth. No tunneling,no undermining  Wound bed is 25-50 % slough,25-50% firm pink granulating tissue.   Wound edges flat and intac

## 2018-06-06 NOTE — PAYOR COMM NOTE
--------------  CONTINUED STAY REVIEW    Payor: [employer] #:  [id number]  Authorization Number: [id number]    Admit date: 6/4/18  Admit time: 2311    Admitting Physician: Keely Pulido MD  Attending Physician: 6/5/2018 1716 Given 2 g Intravenous Clearance CINTHYA Crane    6/5/2018 0840 Given 2 g Intravenous Clearance CINTHYA Crane      gabapentin (NEURONTIN) cap 800 mg     Date Action Dose Route User    6/5/2018 2047 Given 800 mg Oral Fide Knott RN    6/5/2018 1 6/5/2018 1252 Given 40 mEq Oral Claudetta Arabia, RN    6/5/2018 0497 Given 40 mEq Oral Jenna Ken, CINTHYA      Rosuvastatin Calcium (CRESTOR) 20 MG tab 20 mg     Date Action Dose Route User    6/5/2018 2047 Given 20 mg Oral Demetris Markham, RN      [facility]

## 2018-06-06 NOTE — PROGRESS NOTES
[facility]  Progress Note    Fuad Medina Patient Status:  Inpatient    1955 MRN [id number]   [facility] 3NE-A Attending Lai Noyola MD   Hosp Day # 2 PCP Carmella Solo MD     Cc: follow up    Subjective:       March Alexa states pronounced overlying the dorsal aspect suggestive of cellulitis. There is no abscess. There are changes suggestive of superimposed osteomyelitis involving the proximal aspect of the 3rd metatarsal as well as the middle cuneiform.    Evaluation more proxim ordered, hypoglycemia protocol  - continue home dose po neurontin  - accu-checks QID, sliding scale insulin, adjust accordingly   - glc remains controlled today in 140s     # HTN, HL  - remain stable, continue home meds     # Hyponatremia, resolved  - reso

## 2018-06-06 NOTE — PROGRESS NOTES
[facility]                INFECTIOUS DISEASE PROGRESS NOTE    Margarita Peres Patient Status:  Inpatient    1955 MRN [id number]   [facility] 3NE-A Attending Dunia Flower MD   Hosp Day # 2 PCP Matias Staples MD     Antibiotics: tibocalcaneal arthrodesis 9/18/17  Sp hardeware removal 3/15/19 for exposed hardware and treatment polymic infection    Presents with blister medial hind foot, redness and swelling  -improved swelling, redness on iv Cefazolin  Ulcer appears superficial  MR

## 2018-06-06 NOTE — CM/SW NOTE
06/06/18 1500   CM/SW Referral Data   Referral Source Physician;Social Work (self-referral)   Reason for Referral Discharge planning   Informant Patient   Pertinent Medical Hx   Primary Care Physician Name Hrad   Patient Info   Patient's Mental Status A

## 2018-06-06 NOTE — PLAN OF CARE
Diabetes/Glucose Control    • Glucose maintained within prescribed range Progressing        Patient/Family Goals    • Patient/Family Long Term Goal Progressing    • Patient/Family Short Term Goal Progressing        SKIN/TISSUE INTEGRITY - ADULT    • Skin i

## 2018-06-06 NOTE — PLAN OF CARE
Patient is A&Ox4  Complained of mild L leg pain, pain med given per MAR  No n/v/d  On IVP Ancef q8h  L boot noted   Up with assist  L leg elevated on pillow  L foot MRI result paged to Dr Delmar Alberto; no response yet  Accucheck QID  Afebrile, VSS  IS use encour

## 2018-06-06 NOTE — PLAN OF CARE
Will see patient tomorrow for full consult  Discussed case and foot with Dr Biswas Grief  Based on MRI and pictures of foot, no likely surgery planned, recommend prolonged course IV abx for underlying osteomyelitis  Should abscess develop would recommend d and I

## 2018-06-07 VITALS
DIASTOLIC BLOOD PRESSURE: 61 MMHG | WEIGHT: 265 LBS | RESPIRATION RATE: 18 BRPM | HEIGHT: 75 IN | OXYGEN SATURATION: 96 % | SYSTOLIC BLOOD PRESSURE: 134 MMHG | BODY MASS INDEX: 32.95 KG/M2 | TEMPERATURE: 99 F | HEART RATE: 53 BPM

## 2018-06-07 PROCEDURE — 87147 CULTURE TYPE IMMUNOLOGIC: CPT | Performed by: INTERNAL MEDICINE

## 2018-06-07 PROCEDURE — 87205 SMEAR GRAM STAIN: CPT | Performed by: INTERNAL MEDICINE

## 2018-06-07 PROCEDURE — 87077 CULTURE AEROBIC IDENTIFY: CPT | Performed by: INTERNAL MEDICINE

## 2018-06-07 PROCEDURE — 87070 CULTURE OTHR SPECIMN AEROBIC: CPT | Performed by: INTERNAL MEDICINE

## 2018-06-07 PROCEDURE — 85027 COMPLETE CBC AUTOMATED: CPT | Performed by: PHYSICIAN ASSISTANT

## 2018-06-07 PROCEDURE — 82962 GLUCOSE BLOOD TEST: CPT

## 2018-06-07 RX ORDER — GABAPENTIN 400 MG/1
800 CAPSULE ORAL 3 TIMES DAILY
Status: SHIPPED | COMMUNITY
Start: 2018-06-07 | End: 2018-08-10

## 2018-06-07 NOTE — PLAN OF CARE
NURSING DISCHARGE NOTE    Discharged Home via Wheelchair. Accompanied by staff  Belongings Taken by patient/family. Patient given discharge paperwork. Follow up appointments reviewed. Verbalizes understanding.

## 2018-06-07 NOTE — CM/SW NOTE
2:50pm  MSW called [facility] [phone] to set up d/c ride.   Reservation  # [id number]    To f/u on status of ride call: [phone]

## 2018-06-07 NOTE — DISCHARGE SUMMARY
General Medicine Discharge Summary     Patient ID:  Saad Aaron  59year old  4/9/1955    Admit date: 6/4/2018    Discharge date and time: 6/7/18    Attending Physician: DO Bryson Mcdaniel Ca and prefers to f/u with Dr. Sal Varela at [place] either tomorrow or next week as scheduled     # T2DM with associated peripheral neuropathy, charcot foot  - resume home regimen     # HTN, HL  - remain stable, continue home meds     # Hyponatremia, resolved    within the foot including the remaining tarsal bones is limited due to the field of view included. The posterior aspect of the foot including the calcaneus are also not included.   There is soft tissue swelling overlying the dorsal aspect of the midfoot mo glucose is greater than 360 mg/dL    losartan 100 MG Oral Tab  Take 1 tablet (100 mg total) by mouth daily. Rosuvastatin Calcium 20 MG Oral Tab  Take 1 tablet (20 mg total) by mouth nightly.     HYDROcodone-acetaminophen  MG Oral Tab  TK 1 T PO Q 4

## 2018-06-07 NOTE — PAYOR COMM NOTE
--------------  CONTINUED STAY REVIEW    Payor: [address] [employer] #:  [id number]  Authorization Number: [id number]    Admit date: 6/4/18  Admit time: 2311    Admitting Physician: Rene Baron MD  Attending Physician: ceFAZolin sodium (ANCEF/KEFZOL) 2 GM/20ML premix IV syringe 2 g     Date Action Dose Route User    6/6/2018 2313 Given 2 g Intravenous Shannan Gaona, CINTHYA    6/6/2018 1629 Given 2 g Intravenous Britta Edge RN    6/6/2018 1202 Given 2 g Intravenous To Periwound dry. No erythema.        2. Left medial foot  Measures  6.3 cm x 2.6 cm x 0 depth. Wound bed is 50 -75 % slough, 1-25  % firm red granulation tissue, less than 25% eschar. Well defined edges  Small amount of serosanguinous drainage. No odor.   Peggy

## 2018-06-07 NOTE — PROGRESS NOTES
[facility]  Progress Note    Priscilla Arredondo Patient Status:  Inpatient    1955 MRN [id number]   [facility] 3NE-A Attending Richard Connell MD   Hosp Day # 3 PCP Danielle Nieves MD     Cc: follow up    Subjective:      Mr. Kellee Garcia states 06/06/18   1146  06/06/18   1700  06/06/18   2126  06/07/18   0739  06/07/18   1159   PGLU  144*  156*  162*  119*  155*         Imaging:  MRI left foot 6/5/18:  · CONCLUSION:  1.  There is soft tissue swelling involving the left foot most pronounced overly will need MRI ankle     # T2DM with associated peripheral neuropathy, charcot foot  - poorly controlled with Ha1c of 9.3% on 2/2018--> 10.7% 6/5/18  - hold oral diabetic medications (glipizide, metformin) while inpatient  - long acting insulin: continue at

## 2018-06-07 NOTE — CONSULTS
[facility]  Report of Consultation    Wendyaneta Ahuja Patient Status:  Inpatient    1955 MRN [id number]   [facility] 3NE-A Attending Brandi Hardin, [address] [facility] Day # 3 PCP Olivia Valencia MD     Reason for Consultation:  Left foot cellul HISTORY      Comment: bilateral foot surgery for Charcot joint  16 y/o: TONSILLECTOMY  Family History   Problem Relation Age of Onset   • Heart Disease Father    • Diabetes Father    • Heart Disorder Mother      CHF   • Pulmonary Disease Mother      COPD HS  •  Insulin Aspart Pen (NOVOLOG) 100 UNIT/ML flexpen 10 Units, 10 Units, Subcutaneous, TID CC  •  ferrous sulfate EC tab 325 mg, 325 mg, Oral, Daily with breakfast  •  multivitamin (ADULT) tab 1 tablet, 1 tablet, Oral, Daily  •  ondansetron (ZOFRAN-ODT)

## 2018-06-07 NOTE — PROGRESS NOTES
[facility]                INFECTIOUS DISEASE PROGRESS NOTE    Sherlon Kanner Patient Status:  Inpatient    1955 MRN [id number]   [facility] 3NE-A Attending Monique Tracey MD   Hosp Day # 3 PCP Juan Wiggins MD     Antibiotics: ([address]     Mixed hyperlipidemia     Hypokalemia     Diabetic peripheral neuropathy (HCC)     Essential hypertension     Cellulitis      ASSESSMENT/PLAN:  1.  Charcot foot /sp tibocalcaneal arthrodesis 9/18/17  Sp hardeware removal 3/15/19 for exposed hardwar

## 2018-06-09 ENCOUNTER — TELEPHONE (OUTPATIENT)
Dept: INFECTIOUS DISEASE | Facility: CLINIC | Age: 63
End: 2018-06-09

## 2018-06-09 NOTE — TELEPHONE ENCOUNTER
Called patient with results of blood cx showing Actinomyces. He was going to be transferred to [facility], but instead outpatient appt with Dr. Heath Hugo was arranged for within 24h.  Left message for patient to return to hospital if not already admitted to [facility]

## 2018-06-09 NOTE — TELEPHONE ENCOUNTER
Notified patient of positive blood culture for Actinomyces. He saw Dr. Awanda Brunner, but was not admitted, and did not have MRI. Advised patient will need ivabx, and MRI ankle, and is going back to [facility], for admission.  Paged Dr. Awanda Brunner and notifing him of need

## 2018-06-22 NOTE — PAYOR COMM NOTE
--------------  DISCHARGE REVIEW    Payor: [employer] #:  [id number]  Authorization Number: [id number]    Admit date: 6/4/18  Admit time:  2311  Discharge Date: 6/7/2018  4:37 PM     Admitting Physician: Debbie Garcia

## 2018-11-14 PROBLEM — L03.90 CELLULITIS: Status: RESOLVED | Noted: 2018-06-05 | Resolved: 2018-11-14

## 2018-11-14 PROBLEM — M86.462 CHRONIC OSTEOMYELITIS OF LEFT TIBIA WITH DRAINING SINUS (HCC): Status: RESOLVED | Noted: 2018-03-05 | Resolved: 2018-11-14

## 2018-11-14 PROBLEM — E11.610 CHARCOT FOOT DUE TO DIABETES MELLITUS (HCC): Status: ACTIVE | Noted: 2018-11-14

## 2018-11-14 PROBLEM — L97.509 NON-HEALING ULCER OF FOOT (HCC): Status: ACTIVE | Noted: 2018-11-14

## 2018-11-14 PROBLEM — E87.1 HYPONATREMIA: Status: ACTIVE | Noted: 2018-11-14

## 2018-11-14 PROBLEM — E11.610 CHARCOT FOOT DUE TO DIABETES MELLITUS (HCC): Status: RESOLVED | Noted: 2018-11-14 | Resolved: 2018-11-14

## 2018-11-14 PROBLEM — M86.462 CHRONIC OSTEOMYELITIS OF LEFT TIBIA WITH DRAINING SINUS (HCC): Status: ACTIVE | Noted: 2018-03-05

## 2018-12-17 PROCEDURE — 88304 TISSUE EXAM BY PATHOLOGIST: CPT | Performed by: FAMILY MEDICINE

## 2019-01-17 ENCOUNTER — ORDER TRANSCRIPTION (OUTPATIENT)
Dept: WOUND CARE | Facility: HOSPITAL | Age: 64
End: 2019-01-17

## 2019-01-17 DIAGNOSIS — S91.301A OPEN WOUND OF RIGHT FOOT: Primary | ICD-10-CM

## 2019-01-22 ENCOUNTER — OFFICE VISIT (OUTPATIENT)
Dept: WOUND CARE | Facility: HOSPITAL | Age: 64
End: 2019-01-22
Attending: NURSE PRACTITIONER
Payer: MEDICAID

## 2019-01-22 DIAGNOSIS — R60.9 EDEMA: ICD-10-CM

## 2019-01-22 DIAGNOSIS — E11.42: ICD-10-CM

## 2019-01-22 DIAGNOSIS — L97.509 DIABETIC FOOT ULCERS (HCC): ICD-10-CM

## 2019-01-22 DIAGNOSIS — E11.621 DIABETIC FOOT ULCERS (HCC): ICD-10-CM

## 2019-01-22 DIAGNOSIS — L97.522 NON-PRESSURE CHRONIC ULCER OF OTHER PART OF LEFT FOOT WITH FAT LAYER EXPOSED (HCC): ICD-10-CM

## 2019-01-22 DIAGNOSIS — L97.521 NON-PRESSURE CHRONIC ULCER OF OTHER PART OF LEFT FOOT LIMITED TO BREAKDOWN OF SKIN (HCC): ICD-10-CM

## 2019-01-22 DIAGNOSIS — E66.8 OTHER OBESITY: ICD-10-CM

## 2019-01-22 DIAGNOSIS — S91.301A OPEN WOUND OF RIGHT FOOT: ICD-10-CM

## 2019-01-22 DIAGNOSIS — E11.621 DIABETIC FOOT ULCER (HCC): ICD-10-CM

## 2019-01-22 DIAGNOSIS — S91.002A OPEN WOUND OF LEFT ANKLE: ICD-10-CM

## 2019-01-22 DIAGNOSIS — L97.509: ICD-10-CM

## 2019-01-22 DIAGNOSIS — L97.509 DIABETIC FOOT ULCER (HCC): ICD-10-CM

## 2019-01-22 DIAGNOSIS — E11.42 DIABETIC POLYNEUROPATHY (HCC): ICD-10-CM

## 2019-01-22 DIAGNOSIS — E13.610 CHARCOT'S JOINT DISEASE DUE TO SECONDARY DIABETES (HCC): Primary | ICD-10-CM

## 2019-01-22 DIAGNOSIS — T81.31XA DISRUPTION OF EXTERNAL OPERATION (SURGICAL) WOUND, NOT ELSEWHERE CLASSIFIED, INITIAL ENCOUNTER: ICD-10-CM

## 2019-01-22 DIAGNOSIS — L97.321 NON-PRESSURE CHRONIC ULCER OF LEFT ANKLE, LIMITED TO BREAKDOWN OF SKIN (HCC): ICD-10-CM

## 2019-01-22 PROCEDURE — 97597 DBRDMT OPN WND 1ST 20 CM/<: CPT

## 2019-01-22 PROCEDURE — 97162 PT EVAL MOD COMPLEX 30 MIN: CPT

## 2019-01-29 ENCOUNTER — OFFICE VISIT (OUTPATIENT)
Dept: WOUND CARE | Facility: HOSPITAL | Age: 64
End: 2019-01-29
Attending: NURSE PRACTITIONER
Payer: MEDICAID

## 2019-01-29 DIAGNOSIS — L97.522 NON-PRESSURE CHRONIC ULCER OF OTHER PART OF LEFT FOOT WITH FAT LAYER EXPOSED (HCC): ICD-10-CM

## 2019-01-29 DIAGNOSIS — E11.42 DIABETIC POLYNEUROPATHY (HCC): ICD-10-CM

## 2019-01-29 DIAGNOSIS — L97.509 DIABETIC FOOT ULCER (HCC): ICD-10-CM

## 2019-01-29 DIAGNOSIS — L97.521 NON-PRESSURE CHRONIC ULCER OF OTHER PART OF LEFT FOOT LIMITED TO BREAKDOWN OF SKIN (HCC): ICD-10-CM

## 2019-01-29 DIAGNOSIS — S91.301A OPEN WOUND OF RIGHT FOOT: ICD-10-CM

## 2019-01-29 DIAGNOSIS — E11.621 DIABETIC FOOT ULCER (HCC): ICD-10-CM

## 2019-01-29 DIAGNOSIS — T81.31XA DISRUPTION OF EXTERNAL OPERATION (SURGICAL) WOUND, NOT ELSEWHERE CLASSIFIED, INITIAL ENCOUNTER: ICD-10-CM

## 2019-01-29 DIAGNOSIS — S91.002A OPEN WOUND OF LEFT ANKLE: ICD-10-CM

## 2019-01-29 DIAGNOSIS — E11.621 DIABETIC FOOT ULCERS (HCC): ICD-10-CM

## 2019-01-29 DIAGNOSIS — L97.509 DIABETIC FOOT ULCERS (HCC): ICD-10-CM

## 2019-01-29 DIAGNOSIS — E11.42: ICD-10-CM

## 2019-01-29 DIAGNOSIS — L97.509: ICD-10-CM

## 2019-01-29 DIAGNOSIS — E13.610 CHARCOT'S JOINT DISEASE DUE TO SECONDARY DIABETES (HCC): Primary | ICD-10-CM

## 2019-01-29 PROCEDURE — 97597 DBRDMT OPN WND 1ST 20 CM/<: CPT

## 2019-01-29 NOTE — PROGRESS NOTES
Subjective    Chief Complaint  This information was obtained from the patient  1/22/2019 \"I have only had the new boot for a couple of weeks the last boot caused a lot of rubbing on my foot and ankle and did not fit right\".  1/29/2019 \"I noticed the drai Wound #1 Left, Anterior Ankle is a Full Thickness Trauma Wound and has received a status of Not Healed. Subsequent wound encounter measurements are 2.1cm length x 0.3cm width x 0.2cm depth, with an area of 0.63 sq cm and a volume of 0.126 cubic cm.  There i Pt presents with increased odor present upon removal of dressings exudation is moderate in nature. The wounds were all cleansed with Dakin's solution to decrease any bacterial spectrum in the wound regions.   The medial foot wound was debrided of non viable Continue as per POC above to promote edema management and use of antimicrobial specialty dressing to reduce bacterial burden to wound regions.              Entered By: Andreas Ren on 34/75/7556 2:21:59 PM   Signature(s): Date(s):         Header Image

## 2019-02-05 ENCOUNTER — OFFICE VISIT (OUTPATIENT)
Dept: WOUND CARE | Facility: HOSPITAL | Age: 64
End: 2019-02-05
Attending: NURSE PRACTITIONER
Payer: MEDICAID

## 2019-02-05 DIAGNOSIS — L97.521 NON-PRESSURE CHRONIC ULCER OF OTHER PART OF LEFT FOOT LIMITED TO BREAKDOWN OF SKIN (HCC): ICD-10-CM

## 2019-02-05 DIAGNOSIS — S91.002A OPEN WOUND OF LEFT ANKLE: ICD-10-CM

## 2019-02-05 DIAGNOSIS — E13.610 CHARCOT'S JOINT DISEASE DUE TO SECONDARY DIABETES (HCC): Primary | ICD-10-CM

## 2019-02-05 DIAGNOSIS — S91.301A OPEN WOUND OF RIGHT FOOT: ICD-10-CM

## 2019-02-05 DIAGNOSIS — E11.621 DIABETIC FOOT ULCERS (HCC): ICD-10-CM

## 2019-02-05 DIAGNOSIS — T81.31XA DISRUPTION OF EXTERNAL OPERATION (SURGICAL) WOUND, NOT ELSEWHERE CLASSIFIED, INITIAL ENCOUNTER: ICD-10-CM

## 2019-02-05 DIAGNOSIS — E11.42 DIABETIC POLYNEUROPATHY (HCC): ICD-10-CM

## 2019-02-05 DIAGNOSIS — E11.621 DIABETIC FOOT ULCER (HCC): ICD-10-CM

## 2019-02-05 DIAGNOSIS — L97.509: ICD-10-CM

## 2019-02-05 DIAGNOSIS — E11.42: ICD-10-CM

## 2019-02-05 DIAGNOSIS — L97.509 DIABETIC FOOT ULCER (HCC): ICD-10-CM

## 2019-02-05 DIAGNOSIS — L97.509 DIABETIC FOOT ULCERS (HCC): ICD-10-CM

## 2019-02-05 DIAGNOSIS — L97.522 NON-PRESSURE CHRONIC ULCER OF OTHER PART OF LEFT FOOT WITH FAT LAYER EXPOSED (HCC): ICD-10-CM

## 2019-02-05 PROCEDURE — 29581 APPL MULTLAYER CMPRN SYS LEG: CPT

## 2019-02-05 NOTE — PROGRESS NOTES
Subjective    Chief Complaint  This information was obtained from the patient  1/22/2019 \"I have only had the new boot for a couple of weeks the last boot caused a lot of rubbing on my foot and ankle and did not fit right\".  1/29/2019 \"I noticed the drai Wound #1 Left, Anterior Ankle is a Full Thickness Trauma Wound and has received a status of Not Healed. Subsequent wound encounter measurements are 0.6cm length x 0.3cm width x 0.1cm depth, with an area of 0.18 sq cm and a volume of 0.018 cubic cm.  There i J32.817 - Charcot's joint, left ankle and foot  I87.2 - Venous insufficiency (chronic) (peripheral)  S91.302A - Unspecified open wound, left foot, initial encounter        Plan      Continue per POC above to resolve wounds and transition to BLE compression

## 2019-02-11 ENCOUNTER — APPOINTMENT (OUTPATIENT)
Dept: WOUND CARE | Facility: HOSPITAL | Age: 64
End: 2019-02-11
Payer: MEDICAID

## 2019-02-12 ENCOUNTER — APPOINTMENT (OUTPATIENT)
Dept: WOUND CARE | Facility: HOSPITAL | Age: 64
End: 2019-02-12
Attending: NURSE PRACTITIONER
Payer: MEDICAID

## 2019-02-12 DIAGNOSIS — L97.509: ICD-10-CM

## 2019-02-12 DIAGNOSIS — E11.621 DIABETIC FOOT ULCER (HCC): Primary | ICD-10-CM

## 2019-02-12 DIAGNOSIS — E11.42: ICD-10-CM

## 2019-02-12 DIAGNOSIS — T81.31XA DISRUPTION OF EXTERNAL OPERATION (SURGICAL) WOUND, NOT ELSEWHERE CLASSIFIED, INITIAL ENCOUNTER: ICD-10-CM

## 2019-02-12 DIAGNOSIS — L97.509 DIABETIC FOOT ULCER (HCC): Primary | ICD-10-CM

## 2019-02-12 DIAGNOSIS — L97.522 NON-PRESSURE CHRONIC ULCER OF OTHER PART OF LEFT FOOT WITH FAT LAYER EXPOSED (HCC): ICD-10-CM

## 2019-02-12 DIAGNOSIS — E11.42 DIABETIC POLYNEUROPATHY (HCC): ICD-10-CM

## 2019-02-12 DIAGNOSIS — L97.521 NON-PRESSURE CHRONIC ULCER OF OTHER PART OF LEFT FOOT LIMITED TO BREAKDOWN OF SKIN (HCC): ICD-10-CM

## 2019-02-12 DIAGNOSIS — E13.610 CHARCOT'S JOINT DISEASE DUE TO SECONDARY DIABETES (HCC): ICD-10-CM

## 2019-02-12 DIAGNOSIS — L97.509 DIABETIC FOOT ULCERS (HCC): ICD-10-CM

## 2019-02-12 DIAGNOSIS — S91.002A OPEN WOUND OF LEFT ANKLE: ICD-10-CM

## 2019-02-12 DIAGNOSIS — E11.621 DIABETIC FOOT ULCERS (HCC): ICD-10-CM

## 2019-02-12 DIAGNOSIS — S91.301A OPEN WOUND OF RIGHT FOOT: ICD-10-CM

## 2019-02-12 PROCEDURE — 97597 DBRDMT OPN WND 1ST 20 CM/<: CPT

## 2019-02-12 PROCEDURE — 29581 APPL MULTLAYER CMPRN SYS LEG: CPT

## 2019-02-12 NOTE — PROGRESS NOTES
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      Allergies  Coffee    HPI  This inf Wound #2 Left, Medial Foot is a Full Thickness Trauma Wound and has received a status of Not Healed. Subsequent wound encounter measurements are 3.2cm length x 0.7cm width x 0.2cm depth, with an area of 2.24 sq cm and a volume of 0.448 cubic cm.  There is a Wound #2 (Trauma Wound) is located on the left, medial foot. A selective debridement with a total area debrided of 2.24 sq cm Dermis and Epidermis were removed along with devitalized tissue: biofilm, callus, exudate, fibrin, and necrotic/eschar.  The follow

## 2019-02-19 ENCOUNTER — OFFICE VISIT (OUTPATIENT)
Dept: WOUND CARE | Facility: HOSPITAL | Age: 64
End: 2019-02-19
Attending: NURSE PRACTITIONER
Payer: MEDICAID

## 2019-02-19 DIAGNOSIS — L97.509 DIABETIC FOOT ULCER (HCC): ICD-10-CM

## 2019-02-19 DIAGNOSIS — L97.509: ICD-10-CM

## 2019-02-19 DIAGNOSIS — E13.610 CHARCOT'S JOINT DISEASE DUE TO SECONDARY DIABETES (HCC): Primary | ICD-10-CM

## 2019-02-19 DIAGNOSIS — S91.301A OPEN WOUND OF RIGHT FOOT: ICD-10-CM

## 2019-02-19 DIAGNOSIS — E11.42: ICD-10-CM

## 2019-02-19 DIAGNOSIS — E11.621 DIABETIC FOOT ULCER (HCC): ICD-10-CM

## 2019-02-19 DIAGNOSIS — S91.002A OPEN WOUND OF LEFT ANKLE: ICD-10-CM

## 2019-02-19 DIAGNOSIS — L97.521 NON-PRESSURE CHRONIC ULCER OF OTHER PART OF LEFT FOOT LIMITED TO BREAKDOWN OF SKIN (HCC): ICD-10-CM

## 2019-02-19 DIAGNOSIS — E11.621 DIABETIC FOOT ULCERS (HCC): ICD-10-CM

## 2019-02-19 DIAGNOSIS — L97.509 DIABETIC FOOT ULCERS (HCC): ICD-10-CM

## 2019-02-19 PROCEDURE — 97597 DBRDMT OPN WND 1ST 20 CM/<: CPT

## 2019-02-19 NOTE — PROGRESS NOTES
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  2/19/2019 \"I am trying to get over a Wound #2 Left, Medial Foot is a Full Thickness Trauma Wound and has received a status of Not Healed. Subsequent wound encounter measurements are 2.4cm length x 0.4cm width x 0.1cm depth, with an area of 0.96 sq cm and a volume of 0.096 cubic cm.  There is a Wound #2 (Trauma Wound) is located on the left, medial foot. A selective debridement with a total area debrided of 0.96 sq cm was performed by Guillermo Richard PT.  Dermis and Epidermis were removed along with devitalized tissue: biofilm, callus, exudate, fi

## 2019-02-26 ENCOUNTER — OFFICE VISIT (OUTPATIENT)
Dept: WOUND CARE | Facility: HOSPITAL | Age: 64
End: 2019-02-26
Attending: NURSE PRACTITIONER
Payer: MEDICAID

## 2019-02-26 DIAGNOSIS — E11.621 DIABETIC FOOT ULCERS (HCC): ICD-10-CM

## 2019-02-26 DIAGNOSIS — E13.610 CHARCOT'S JOINT DISEASE DUE TO SECONDARY DIABETES (HCC): Primary | ICD-10-CM

## 2019-02-26 DIAGNOSIS — S91.002A OPEN WOUND OF LEFT ANKLE: ICD-10-CM

## 2019-02-26 DIAGNOSIS — L97.509 DIABETIC FOOT ULCERS (HCC): ICD-10-CM

## 2019-02-26 PROCEDURE — 29581 APPL MULTLAYER CMPRN SYS LEG: CPT

## 2019-02-26 NOTE — PROGRESS NOTES
Subjective    Chief Complaint  This information was obtained from the patient  2/26/2019 \" I am going out of town to [facility] for 1 wk for a work assignment and will be back for my next appointment\" .     Allergies  Coffee    HPI  This information was obtained Pt presents with significant improvements in wound area and volume. The wound area was cleansed and covered with xeroform and an Aqaucel hydro fiber AG absorb and moisture in the vickie wound regions and assist with antimicrobial benefits.  Multi layer compre

## 2019-03-05 ENCOUNTER — OFFICE VISIT (OUTPATIENT)
Dept: WOUND CARE | Facility: HOSPITAL | Age: 64
End: 2019-03-05
Attending: NURSE PRACTITIONER
Payer: MEDICAID

## 2019-03-05 DIAGNOSIS — L97.509 DIABETIC FOOT ULCER (HCC): ICD-10-CM

## 2019-03-05 DIAGNOSIS — T81.31XA DISRUPTION OF EXTERNAL OPERATION (SURGICAL) WOUND, NOT ELSEWHERE CLASSIFIED, INITIAL ENCOUNTER: ICD-10-CM

## 2019-03-05 DIAGNOSIS — E13.610 CHARCOT'S JOINT DISEASE DUE TO SECONDARY DIABETES (HCC): Primary | ICD-10-CM

## 2019-03-05 DIAGNOSIS — S91.301A OPEN WOUND OF RIGHT FOOT: ICD-10-CM

## 2019-03-05 DIAGNOSIS — E11.621 DIABETIC FOOT ULCER (HCC): ICD-10-CM

## 2019-03-05 PROCEDURE — 97597 DBRDMT OPN WND 1ST 20 CM/<: CPT

## 2019-03-05 PROCEDURE — 29581 APPL MULTLAYER CMPRN SYS LEG: CPT

## 2019-03-05 NOTE — PROGRESS NOTES
Subjective    Chief Complaint  This information was obtained from the patient  2/26/2019 \" I am going out of town to [facility] for 1 wk for a work assignment and will be back for my next appointment\" . 3/5/2019 \"I feel good and I went on my trip to [facility] with no p Pt presents with improved wound healing with decreased wound are and volume nearly resolved medial foot wound. The pt went on a 1 week trip to [facility] with no difficulty and and will be ready to transition to diabetic shoes and inserts upon his next [facility] visit.

## 2019-03-12 ENCOUNTER — OFFICE VISIT (OUTPATIENT)
Dept: WOUND CARE | Facility: HOSPITAL | Age: 64
End: 2019-03-12
Attending: NURSE PRACTITIONER
Payer: MEDICAID

## 2019-03-12 DIAGNOSIS — E11.42 DIABETIC POLYNEUROPATHY (HCC): ICD-10-CM

## 2019-03-12 DIAGNOSIS — L97.509 DIABETIC FOOT ULCERS (HCC): ICD-10-CM

## 2019-03-12 DIAGNOSIS — L97.521 NON-PRESSURE CHRONIC ULCER OF OTHER PART OF LEFT FOOT LIMITED TO BREAKDOWN OF SKIN (HCC): ICD-10-CM

## 2019-03-12 DIAGNOSIS — E11.621 DIABETIC FOOT ULCER (HCC): ICD-10-CM

## 2019-03-12 DIAGNOSIS — E11.42: ICD-10-CM

## 2019-03-12 DIAGNOSIS — L97.522 NON-PRESSURE CHRONIC ULCER OF OTHER PART OF LEFT FOOT WITH FAT LAYER EXPOSED (HCC): ICD-10-CM

## 2019-03-12 DIAGNOSIS — E13.610 CHARCOT'S JOINT DISEASE DUE TO SECONDARY DIABETES (HCC): Primary | ICD-10-CM

## 2019-03-12 DIAGNOSIS — S91.002A OPEN WOUND OF LEFT ANKLE: ICD-10-CM

## 2019-03-12 DIAGNOSIS — L97.509 DIABETIC FOOT ULCER (HCC): ICD-10-CM

## 2019-03-12 DIAGNOSIS — E11.621 DIABETIC FOOT ULCERS (HCC): ICD-10-CM

## 2019-03-12 DIAGNOSIS — T81.31XA DISRUPTION OF EXTERNAL OPERATION (SURGICAL) WOUND, NOT ELSEWHERE CLASSIFIED, INITIAL ENCOUNTER: ICD-10-CM

## 2019-03-12 DIAGNOSIS — L97.509: ICD-10-CM

## 2019-03-12 DIAGNOSIS — S91.301A OPEN WOUND OF RIGHT FOOT: ICD-10-CM

## 2019-03-12 PROCEDURE — 99213 OFFICE O/P EST LOW 20 MIN: CPT

## 2019-03-12 NOTE — PROGRESS NOTES
Subjective    Chief Complaint  This information was obtained from the patient  2/26/2019 \" I am going out of town to [facility] for 1 wk for a work assignment and will be back for my next appointment\" . 3/5/2019 \"I feel good and I went on my trip to [facility] with no p The periwound skin color is normal. The periwound skin exhibited: Edema, Callus, Friable, Dry/Scaly. Assessment  Pt presents with a resolved wound with all goals achieved.  The resolved wound was cleaned and redressed with xeroform 2x2 to maintain a Wounds are resolved and goals achieved pt scheduled to receive his diabetic custom shoes and inserts on Mon and pt will purchase his own 2 layer tubi  size F for edema prevention.  Pt was also issued a card for [facility] if he needs additional co

## 2019-03-13 ENCOUNTER — APPOINTMENT (OUTPATIENT)
Dept: WOUND CARE | Facility: HOSPITAL | Age: 64
End: 2019-03-13
Attending: NURSE PRACTITIONER
Payer: MEDICAID

## 2019-04-02 PROCEDURE — 87205 SMEAR GRAM STAIN: CPT | Performed by: NURSE PRACTITIONER

## 2019-04-02 PROCEDURE — 87186 SC STD MICRODIL/AGAR DIL: CPT | Performed by: NURSE PRACTITIONER

## 2019-04-02 PROCEDURE — 87077 CULTURE AEROBIC IDENTIFY: CPT | Performed by: NURSE PRACTITIONER

## 2019-04-02 PROCEDURE — 87070 CULTURE OTHR SPECIMN AEROBIC: CPT | Performed by: NURSE PRACTITIONER

## 2019-04-15 ENCOUNTER — OFFICE VISIT (OUTPATIENT)
Dept: WOUND CARE | Facility: HOSPITAL | Age: 64
End: 2019-04-15
Attending: NURSE PRACTITIONER
Payer: MEDICAID

## 2019-04-15 DIAGNOSIS — L97.501: ICD-10-CM

## 2019-04-15 DIAGNOSIS — E11.42 DIABETIC PERIPHERAL NEUROPATHY (HCC): ICD-10-CM

## 2019-04-15 DIAGNOSIS — L97.509: ICD-10-CM

## 2019-04-15 DIAGNOSIS — S91.002A OPEN WOUND OF LEFT ANKLE: Primary | ICD-10-CM

## 2019-04-15 DIAGNOSIS — E13.610 CHARCOT'S JOINT DISEASE DUE TO SECONDARY DIABETES (HCC): ICD-10-CM

## 2019-04-15 PROCEDURE — 97162 PT EVAL MOD COMPLEX 30 MIN: CPT

## 2019-04-15 PROCEDURE — 29581 APPL MULTLAYER CMPRN SYS LEG: CPT

## 2019-04-15 NOTE — PROGRESS NOTES
Subjective    Chief Complaint  This information was obtained from the patient  4/15/2019 \"I think I may have caused the new wound to open up by eating too much salty foot and doing so much walking and driving so my leg seemed to swell and I had trouble wi Alcohol Use - no, Tobacco Use (deprecated) - never, Smokeless Tobacco (deprecated) - never, Illicit Drug Use - no    Medical History  This information was obtained from the patient  Patient has a medical history of:  charcot foot  cellulitis (6/2018)  oste Wound #2a Left, Medial Foot is a James Grade 1 Diabetic Ulcer and has received a status of Not Healed. Initial wound encounter measurements are 0.3cm length x 2cm width x 0.1cm depth, with an area of 0.6 sq cm and a volume of 0.06 cubic cm.  There is a sca Pt presents with a open wound on the medial aspect of his L foot in the same location as his previously resolved wound that healed last month.  The pt reports he was wearing his diabetic shoe and custom insert as recommended but he was very active walking a E11.622 - Type 2 diabetes mellitus with other skin ulcer  L97.909 - Non-pressure chronic ulcer of unspecified part of unspecified lower leg with unspecified severity  S91.301A - Unspecified open wound, right foot, initial encounter  General Notes:  Dx:  Ulc

## 2019-04-23 ENCOUNTER — OFFICE VISIT (OUTPATIENT)
Dept: WOUND CARE | Facility: HOSPITAL | Age: 64
End: 2019-04-23
Attending: NURSE PRACTITIONER
Payer: MEDICAID

## 2019-04-23 DIAGNOSIS — L97.501: ICD-10-CM

## 2019-04-23 DIAGNOSIS — S91.002A OPEN WOUND OF LEFT ANKLE: ICD-10-CM

## 2019-04-23 PROCEDURE — 99213 OFFICE O/P EST LOW 20 MIN: CPT

## 2019-04-23 NOTE — PROGRESS NOTES
Subjective    Chief Complaint  This information was obtained from the patient  4/15/2019 \"I think I may have caused the new wound to open up by eating too much salty foot and doing so much walking and driving so my leg seemed to swell and I had trouble wi Constitutional  Height/Length: 75 in (190.5 cm), Weight: 292 lbs (132.73 kgs), BMI: 36.5, (36.56 °C). Integumentary (Hair, Skin)  Wound #2a Left, Medial Foot is a James Grade 1 Diabetic Ulcer and has received a status of Not Healed.  Subsequent wound e Entered By: Sara Reyes on 57/21/2901 1:57:19 PM   Signature(s): Date(s):

## 2019-04-30 ENCOUNTER — OFFICE VISIT (OUTPATIENT)
Dept: WOUND CARE | Facility: HOSPITAL | Age: 64
End: 2019-04-30
Attending: NURSE PRACTITIONER
Payer: MEDICAID

## 2019-04-30 DIAGNOSIS — L97.509 DIABETIC FOOT ULCER (HCC): ICD-10-CM

## 2019-04-30 DIAGNOSIS — E11.621 DIABETIC FOOT ULCER (HCC): ICD-10-CM

## 2019-04-30 DIAGNOSIS — S91.002A OPEN WOUND OF LEFT ANKLE: Primary | ICD-10-CM

## 2019-04-30 DIAGNOSIS — E11.42 DIABETIC PERIPHERAL NEUROPATHY (HCC): ICD-10-CM

## 2019-04-30 PROCEDURE — 99213 OFFICE O/P EST LOW 20 MIN: CPT

## 2019-04-30 NOTE — PROGRESS NOTES
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      Allergies  Coffee    HPI  This information was obtained f Wound #2a Left, Medial Foot is a James Grade 1 Diabetic Ulcer and has received a status of Not Healed. Subsequent wound encounter measurements are 0.2cm length x 0.5cm width x 0.1cm depth, with an area of 0.1 sq cm and a volume of 0.01 cubic cm.  There is

## 2019-05-07 ENCOUNTER — OFFICE VISIT (OUTPATIENT)
Dept: WOUND CARE | Facility: HOSPITAL | Age: 64
End: 2019-05-07
Attending: NURSE PRACTITIONER
Payer: MEDICAID

## 2019-05-07 DIAGNOSIS — E11.621 DIABETIC FOOT ULCER (HCC): ICD-10-CM

## 2019-05-07 DIAGNOSIS — L97.509 DIABETIC FOOT ULCERS (HCC): ICD-10-CM

## 2019-05-07 DIAGNOSIS — E13.610 CHARCOT'S JOINT DISEASE DUE TO SECONDARY DIABETES (HCC): ICD-10-CM

## 2019-05-07 DIAGNOSIS — L97.509 DIABETIC FOOT ULCER (HCC): ICD-10-CM

## 2019-05-07 DIAGNOSIS — S91.002A OPEN WOUND OF LEFT ANKLE: Primary | ICD-10-CM

## 2019-05-07 DIAGNOSIS — L97.509: ICD-10-CM

## 2019-05-07 DIAGNOSIS — E11.621 DIABETIC FOOT ULCERS (HCC): ICD-10-CM

## 2019-05-07 PROCEDURE — 29581 APPL MULTLAYER CMPRN SYS LEG: CPT

## 2019-05-07 PROCEDURE — 99213 OFFICE O/P EST LOW 20 MIN: CPT

## 2019-05-07 NOTE — PROGRESS NOTES
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  5/7/2019 \"I went on my rode trip and drove over 12 hrs so th Integumentary (Hair, Skin)  Wound #2a Left, Medial Foot is a James Grade 1 Diabetic Ulcer and has received a status of Not Healed.  Subsequent wound encounter measurements are 0.2cm length x 0.3cm width x 0.1cm depth, with an area of 0.06 sq cm and a volum

## 2019-05-14 ENCOUNTER — APPOINTMENT (OUTPATIENT)
Dept: WOUND CARE | Facility: HOSPITAL | Age: 64
End: 2019-05-14
Attending: NURSE PRACTITIONER
Payer: MEDICAID

## 2019-05-17 ENCOUNTER — APPOINTMENT (OUTPATIENT)
Dept: WOUND CARE | Facility: HOSPITAL | Age: 64
End: 2019-05-17
Attending: NURSE PRACTITIONER
Payer: MEDICAID

## 2019-05-21 ENCOUNTER — APPOINTMENT (OUTPATIENT)
Dept: WOUND CARE | Facility: HOSPITAL | Age: 64
End: 2019-05-21
Attending: NURSE PRACTITIONER
Payer: MEDICAID

## 2019-05-24 ENCOUNTER — OFFICE VISIT (OUTPATIENT)
Dept: WOUND CARE | Facility: HOSPITAL | Age: 64
End: 2019-05-24
Attending: NURSE PRACTITIONER
Payer: MEDICAID

## 2019-05-24 DIAGNOSIS — S91.002D OPEN WOUND OF LEFT ANKLE, SUBSEQUENT ENCOUNTER: ICD-10-CM

## 2019-05-24 PROCEDURE — 97597 DBRDMT OPN WND 1ST 20 CM/<: CPT

## 2019-05-24 NOTE — PROGRESS NOTES
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  5/7/2019 \"I went on my rode trip and drove over 12 hrs so th 4/15/2019 He was previously treated in the wound clinic for a wound on his left foot and it had healed. He had transitioned to a shoe when he noticed his wound started opening up again on Thursday and was oozing.  He has neuropathy so he does not feel much E11.622 - Type 2 diabetes mellitus with other skin ulcer  L97.909 - Non-pressure chronic ulcer of unspecified part of unspecified lower leg with unspecified severity  S91.301A - Unspecified open wound, right foot, initial encounter        Procedures    Wou Post Procedural Pain: 0  Response to Treatment: Procedure was tolerated well      Entered By: Mika Boland on 17/55/0975 10:50:49 AM   Signature(s): Date(s):

## 2019-07-16 ENCOUNTER — OFFICE VISIT (OUTPATIENT)
Dept: WOUND CARE | Facility: HOSPITAL | Age: 64
End: 2019-07-16
Attending: NURSE PRACTITIONER
Payer: MEDICAID

## 2019-07-16 DIAGNOSIS — E11.621 DIABETIC FOOT ULCERS (HCC): ICD-10-CM

## 2019-07-16 DIAGNOSIS — L97.509: ICD-10-CM

## 2019-07-16 DIAGNOSIS — S91.002D OPEN WOUND OF LEFT ANKLE, SUBSEQUENT ENCOUNTER: ICD-10-CM

## 2019-07-16 DIAGNOSIS — L97.501: ICD-10-CM

## 2019-07-16 DIAGNOSIS — E11.621 DIABETIC FOOT ULCER (HCC): ICD-10-CM

## 2019-07-16 DIAGNOSIS — E11.42: ICD-10-CM

## 2019-07-16 DIAGNOSIS — E11.42 DIABETIC PERIPHERAL NEUROPATHY (HCC): ICD-10-CM

## 2019-07-16 DIAGNOSIS — L97.509 DIABETIC FOOT ULCER (HCC): ICD-10-CM

## 2019-07-16 DIAGNOSIS — E11.42 DIABETIC POLYNEUROPATHY (HCC): ICD-10-CM

## 2019-07-16 DIAGNOSIS — E13.610 CHARCOT'S JOINT DISEASE DUE TO SECONDARY DIABETES (HCC): Primary | ICD-10-CM

## 2019-07-16 DIAGNOSIS — L97.509 DIABETIC FOOT ULCERS (HCC): ICD-10-CM

## 2019-07-16 DIAGNOSIS — T81.31XA DISRUPTION OF EXTERNAL OPERATION (SURGICAL) WOUND, NOT ELSEWHERE CLASSIFIED, INITIAL ENCOUNTER: ICD-10-CM

## 2019-07-16 PROBLEM — S91.302A OPEN WOUND OF HEEL, LEFT, INITIAL ENCOUNTER: Status: ACTIVE | Noted: 2019-07-16

## 2019-07-16 PROCEDURE — 97162 PT EVAL MOD COMPLEX 30 MIN: CPT

## 2019-07-16 PROCEDURE — 29581 APPL MULTLAYER CMPRN SYS LEG: CPT

## 2019-07-16 NOTE — PROGRESS NOTES
Subjective    Chief Complaint  This information was obtained from the patient  7/16/2019 \"I felt some pain in my heel and checked it out and there was a small stone in my shoe and it caused a blister and than the blister got bigger and popped so I went to 7/16/2019 This 62year old male comes in today with a diabetic ulcer on his left heel he has had for > 2 wks. Concerned because he has had prolonged wounds of the foot in the past and he has been a previous [facility] patient.   MD Requesting wound clini ferrous sulfate - oral 325 mg (65 mg iron) tablet once daily  multivitamin - oral tablet once daily  hydrocodone-acetaminophen - oral 10 mg-325 mg tablet other  mupirocin calcium - topical 2 % cream once daily        Objective    Constitutional  Height/Fredo Pt presents with a heel wound acquired when he noticed a small stone in his shoe that caused a blister and subsequent wound. Pt  reports he never did get the diabetic shoe that was recommended during his last wcc episode.  Pt reports he had a visit with his Pt will continue to be seen 1x/week for 6-8 weeks for selective debridement (92659RD, [id number]), advanced wound dressings, compression wrapping (41584NA), possible SAMMIE (34181IO), and possible epifix (-PjuLqk, [id number]) to achieve the above mentioned

## 2019-07-23 ENCOUNTER — OFFICE VISIT (OUTPATIENT)
Dept: WOUND CARE | Facility: HOSPITAL | Age: 64
End: 2019-07-23
Attending: NURSE PRACTITIONER
Payer: MEDICAID

## 2019-07-23 ENCOUNTER — HOSPITAL ENCOUNTER (OUTPATIENT)
Dept: GENERAL RADIOLOGY | Facility: HOSPITAL | Age: 64
Discharge: HOME | End: 2019-07-23
Attending: NURSE PRACTITIONER
Payer: MEDICAID

## 2019-07-23 DIAGNOSIS — L97.509 DIABETIC FOOT ULCERS (HCC): ICD-10-CM

## 2019-07-23 DIAGNOSIS — E11.621 DIABETIC FOOT ULCER (HCC): ICD-10-CM

## 2019-07-23 DIAGNOSIS — E11.42 DIABETIC PERIPHERAL NEUROPATHY (HCC): Primary | ICD-10-CM

## 2019-07-23 DIAGNOSIS — S91.002D OPEN WOUND OF LEFT ANKLE, SUBSEQUENT ENCOUNTER: ICD-10-CM

## 2019-07-23 DIAGNOSIS — E11.42: ICD-10-CM

## 2019-07-23 DIAGNOSIS — E13.610 CHARCOT'S JOINT DISEASE DUE TO SECONDARY DIABETES (HCC): ICD-10-CM

## 2019-07-23 DIAGNOSIS — E11.621 DIABETIC FOOT ULCERS (HCC): ICD-10-CM

## 2019-07-23 DIAGNOSIS — L97.509: ICD-10-CM

## 2019-07-23 DIAGNOSIS — L97.501: ICD-10-CM

## 2019-07-23 DIAGNOSIS — L97.509 DIABETIC FOOT ULCER (HCC): ICD-10-CM

## 2019-07-23 PROCEDURE — 73620 X-RAY EXAM OF FOOT: CPT | Performed by: NURSE PRACTITIONER

## 2019-07-23 PROCEDURE — 73630 X-RAY EXAM OF FOOT: CPT | Performed by: NURSE PRACTITIONER

## 2019-07-23 PROCEDURE — 97597 DBRDMT OPN WND 1ST 20 CM/<: CPT

## 2019-07-23 NOTE — PROGRESS NOTES
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      Allergies  Coffee    HPI  This 7/16/2019 This 62year old male comes in today with a diabetic ulcer on his left heel he has had for > 2 wks. Concerned because he has had prolonged wounds of the foot in the past and he has been a previous [facility] patient.   MD Requesting wound clini Pt presents with increased heel maceration and a soft feel to the posterior heel region. A xray ray has been ordered to R/O osteomyelitis of the heel and the pt agrees he will complete this xray after his [facility] treatment today.  The wound was selectively debr

## 2019-07-30 ENCOUNTER — OFFICE VISIT (OUTPATIENT)
Dept: WOUND CARE | Facility: HOSPITAL | Age: 64
End: 2019-07-30
Attending: NURSE PRACTITIONER
Payer: MEDICAID

## 2019-07-30 DIAGNOSIS — E13.610 CHARCOT'S JOINT DISEASE DUE TO SECONDARY DIABETES (HCC): ICD-10-CM

## 2019-07-30 DIAGNOSIS — E11.621 DIABETIC FOOT ULCERS (HCC): ICD-10-CM

## 2019-07-30 DIAGNOSIS — L97.509 DIABETIC FOOT ULCERS (HCC): ICD-10-CM

## 2019-07-30 DIAGNOSIS — E11.42 DIABETIC PERIPHERAL NEUROPATHY (HCC): Primary | ICD-10-CM

## 2019-07-30 DIAGNOSIS — E11.621 DIABETIC FOOT ULCER (HCC): ICD-10-CM

## 2019-07-30 DIAGNOSIS — S91.002D OPEN WOUND OF LEFT ANKLE, SUBSEQUENT ENCOUNTER: ICD-10-CM

## 2019-07-30 DIAGNOSIS — E11.42: ICD-10-CM

## 2019-07-30 DIAGNOSIS — L97.501: ICD-10-CM

## 2019-07-30 DIAGNOSIS — L97.509 DIABETIC FOOT ULCER (HCC): ICD-10-CM

## 2019-07-30 DIAGNOSIS — L97.509: ICD-10-CM

## 2019-07-30 PROCEDURE — 29581 APPL MULTLAYER CMPRN SYS LEG: CPT

## 2019-07-30 NOTE — PROGRESS NOTES
Subjective    Chief Complaint  This information was obtained from the patient  7/30/2019 \"I will be out of town for 2 wks after my next visit so if you can show me how to change my dressings and wrap the leg that will be best\".      Allergies  Coffee    H 7/16/2019 This 62year old male comes in today with a diabetic ulcer on his left heel he has had for > 2 wks. Concerned because he has had prolonged wounds of the foot in the past and he has been a previous [facility] patient.   MD Requesting wound clini Pt presents with significant improvements in wound area adn volume since his last visist with the use of acticoat combined with aquacel to provide antimicrobial support with increased absorption.  The wound was cleansed and redressed as per the POC , see tx

## 2019-08-06 ENCOUNTER — OFFICE VISIT (OUTPATIENT)
Dept: WOUND CARE | Facility: HOSPITAL | Age: 64
End: 2019-08-06
Attending: NURSE PRACTITIONER
Payer: MEDICAID

## 2019-08-06 DIAGNOSIS — E13.610 CHARCOT'S JOINT DISEASE DUE TO SECONDARY DIABETES (HCC): ICD-10-CM

## 2019-08-06 DIAGNOSIS — I87.2 VENOUS INSUFFICIENCY (CHRONIC) (PERIPHERAL): ICD-10-CM

## 2019-08-06 DIAGNOSIS — L97.501: ICD-10-CM

## 2019-08-06 DIAGNOSIS — L97.509 DIABETIC FOOT ULCER (HCC): Primary | ICD-10-CM

## 2019-08-06 DIAGNOSIS — E11.621 DIABETIC FOOT ULCER (HCC): Primary | ICD-10-CM

## 2019-08-06 DIAGNOSIS — E11.621 DIABETIC FOOT ULCERS (HCC): ICD-10-CM

## 2019-08-06 DIAGNOSIS — L97.509 DIABETIC FOOT ULCERS (HCC): ICD-10-CM

## 2019-08-06 PROCEDURE — 29581 APPL MULTLAYER CMPRN SYS LEG: CPT

## 2019-08-06 NOTE — PROGRESS NOTES
Subjective    Chief Complaint  This information was obtained from the patient  8/6/2019 \"I am going to see my MD tomorrow I can ask him for a script for my LE Velcro compression devices\".      Allergies  Coffee    HPI  This information was obtained from t 7/16/2019 This 62year old male comes in today with a diabetic ulcer on his left heel he has had for > 2 wks. Concerned because he has had prolonged wounds of the foot in the past and he has been a previous [facility] patient.   MD Requesting wound clini Pt presented with a slight increase in wound volume with increased vickie wound maceration. The wound was cleansed and redressed with acticoat and 2 layers of silver AG hydro-fiber to provided added moisture control and antimicrobial benefits.  Offloading was

## 2019-08-13 ENCOUNTER — OFFICE VISIT (OUTPATIENT)
Dept: WOUND CARE | Facility: HOSPITAL | Age: 64
End: 2019-08-13
Attending: NURSE PRACTITIONER
Payer: MEDICAID

## 2019-08-13 DIAGNOSIS — I87.2 VENOUS INSUFFICIENCY (CHRONIC) (PERIPHERAL): ICD-10-CM

## 2019-08-13 DIAGNOSIS — S91.002D OPEN WOUND OF LEFT ANKLE, SUBSEQUENT ENCOUNTER: ICD-10-CM

## 2019-08-13 DIAGNOSIS — E11.621 DIABETIC ULCER OF LEFT HEEL ASSOCIATED WITH TYPE 2 DIABETES MELLITUS, LIMITED TO BREAKDOWN OF SKIN (HCC): Primary | ICD-10-CM

## 2019-08-13 DIAGNOSIS — L97.421 DIABETIC ULCER OF LEFT HEEL ASSOCIATED WITH TYPE 2 DIABETES MELLITUS, LIMITED TO BREAKDOWN OF SKIN (HCC): Primary | ICD-10-CM

## 2019-08-13 DIAGNOSIS — E13.610 CHARCOT'S JOINT DISEASE DUE TO SECONDARY DIABETES (HCC): ICD-10-CM

## 2019-08-13 PROCEDURE — 29581 APPL MULTLAYER CMPRN SYS LEG: CPT

## 2019-08-13 NOTE — PROGRESS NOTES
Subjective    Chief Complaint  This information was obtained from the patient  8/13/2019 \"I had some pain in the heal so I elevated, I was walking a lot for I may have over done it\".      Allergies  Coffee    HPI  This information was obtained from the pa 7/16/2019 This 62year old male comes in today with a diabetic ulcer on his left heel he has had for > 2 wks. Concerned because he has had prolonged wounds of the foot in the past and he has been a previous [facility] patient.  MD Requesting wound clinic Pt presents with a macerated vickie wound and wound margins with regions of non viable tissue present. The wound was cleansed with mechanical debridement of slough with 4x4's.  Acticoat, and hydrofiber AG was applied with xtrasorb to aid in absorption of exud

## 2019-08-21 ENCOUNTER — OFFICE VISIT (OUTPATIENT)
Dept: WOUND CARE | Facility: HOSPITAL | Age: 64
End: 2019-08-21
Attending: NURSE PRACTITIONER
Payer: MEDICAID

## 2019-08-21 DIAGNOSIS — E11.621 DIABETIC ULCER OF LEFT HEEL ASSOCIATED WITH TYPE 2 DIABETES MELLITUS, LIMITED TO BREAKDOWN OF SKIN (HCC): Primary | ICD-10-CM

## 2019-08-21 DIAGNOSIS — E13.610 CHARCOT'S JOINT DISEASE DUE TO SECONDARY DIABETES (HCC): ICD-10-CM

## 2019-08-21 DIAGNOSIS — L97.421 DIABETIC ULCER OF LEFT HEEL ASSOCIATED WITH TYPE 2 DIABETES MELLITUS, LIMITED TO BREAKDOWN OF SKIN (HCC): Primary | ICD-10-CM

## 2019-08-21 PROCEDURE — 29581 APPL MULTLAYER CMPRN SYS LEG: CPT

## 2019-08-21 NOTE — PROGRESS NOTES
Subjective    Chief Complaint  This information was obtained from the patient  8/21/19: \"I'm going to be out of town for the week, so I might be on my feet a lot\"     Allergies  Coffee    HPI  This information was obtained from the patient  PT reports ha 7/16/2019 This 62year old male comes in today with a diabetic ulcer on his left heel he has had for > 2 wks. Concerned because he has had prolonged wounds of the foot in the past and he has been a previous [facility] patient.   MD Requesting wound clini Anticipate slow healing given the difficult location on the plantar heel, comorbidity of diabetes with high A1c when last tested in April (11.0), pt's overweight, and high bioburden/drainage at this time.   Opted for acetic acid moist dressing with absorben Treatment Notes Summary  Wound #3 (Left, Plantar Heel)  . Wound Treatment Note  Limb cleansed using Soap and water (1)  Cleansed wound and periwound with non-cytotoxic agent.  using 0.9% normal saline (1)  Applied topical product to vickie-wound area avoiding Thank you for your referral. Please co-sign  this letter using the CO-SIGN button to certify the need for these services furnished under this plan of treatment and while under my care. If you have any questions, please contact me at Dept.  Dept: [phone]

## 2019-08-30 ENCOUNTER — APPOINTMENT (OUTPATIENT)
Dept: WOUND CARE | Facility: HOSPITAL | Age: 64
End: 2019-08-30
Attending: NURSE PRACTITIONER
Payer: MEDICAID

## 2019-09-06 ENCOUNTER — APPOINTMENT (OUTPATIENT)
Dept: WOUND CARE | Facility: HOSPITAL | Age: 64
End: 2019-09-06
Attending: NURSE PRACTITIONER
Payer: MEDICAID

## 2019-09-09 ENCOUNTER — OFFICE VISIT (OUTPATIENT)
Dept: WOUND CARE | Facility: HOSPITAL | Age: 64
End: 2019-09-09
Attending: NURSE PRACTITIONER
Payer: MEDICAID

## 2019-09-09 DIAGNOSIS — S91.002D OPEN WOUND OF LEFT ANKLE, SUBSEQUENT ENCOUNTER: ICD-10-CM

## 2019-09-09 DIAGNOSIS — E11.42 DIABETIC PERIPHERAL NEUROPATHY (HCC): ICD-10-CM

## 2019-09-09 DIAGNOSIS — L97.501: ICD-10-CM

## 2019-09-09 DIAGNOSIS — L97.509: ICD-10-CM

## 2019-09-09 DIAGNOSIS — E11.42: ICD-10-CM

## 2019-09-09 DIAGNOSIS — T81.31XA DISRUPTION OF EXTERNAL OPERATION (SURGICAL) WOUND, NOT ELSEWHERE CLASSIFIED, INITIAL ENCOUNTER: ICD-10-CM

## 2019-09-09 DIAGNOSIS — E13.610 CHARCOT'S JOINT DISEASE DUE TO SECONDARY DIABETES (HCC): ICD-10-CM

## 2019-09-09 DIAGNOSIS — E11.621 DIABETIC ULCER OF LEFT HEEL ASSOCIATED WITH TYPE 2 DIABETES MELLITUS, LIMITED TO BREAKDOWN OF SKIN (HCC): Primary | ICD-10-CM

## 2019-09-09 DIAGNOSIS — I87.2 VENOUS INSUFFICIENCY (CHRONIC) (PERIPHERAL): ICD-10-CM

## 2019-09-09 DIAGNOSIS — E11.621 DIABETIC FOOT ULCERS (HCC): ICD-10-CM

## 2019-09-09 DIAGNOSIS — L97.509 DIABETIC FOOT ULCERS (HCC): ICD-10-CM

## 2019-09-09 DIAGNOSIS — L97.421 DIABETIC ULCER OF LEFT HEEL ASSOCIATED WITH TYPE 2 DIABETES MELLITUS, LIMITED TO BREAKDOWN OF SKIN (HCC): Primary | ICD-10-CM

## 2019-09-09 PROCEDURE — 97597 DBRDMT OPN WND 1ST 20 CM/<: CPT

## 2019-09-09 NOTE — PROGRESS NOTES
Subjective    Chief Complaint  This information was obtained from the patient  9/9/2019 \"I was out of town traveling to [facility] for the last 2 weeks and I missed a couple of appointments visiting friends sorry I canceled a couple of treatments\".     Lele Kwok 7/16/2019 This 62year old male comes in today with a diabetic ulcer on his left heel he has had for > 2 wks. Concerned because he has had prolonged wounds of the foot in the past and he has been a previous [facility] patient.   MD Requesting wound clini Continue per POC 1//wk to reduce the wound area and volume to assist with tissue proliferation. Wound Orders:  Wound #3 Left, Plantar Heel    Follow-Up Appointments  Return Appointment in 1 week.  - 1x /wk 30 min x 4 wks            Entered By: Yris Amin

## 2019-09-13 ENCOUNTER — HOSPITAL ENCOUNTER (INPATIENT)
Facility: HOSPITAL | Age: 64
LOS: 4 days | Discharge: HOME | DRG: 638 | End: 2019-09-17
Attending: EMERGENCY MEDICINE | Admitting: HOSPITALIST
Payer: MEDICAID

## 2019-09-13 ENCOUNTER — APPOINTMENT (OUTPATIENT)
Dept: GENERAL RADIOLOGY | Facility: HOSPITAL | Age: 64
DRG: 638 | End: 2019-09-13
Attending: PHYSICIAN ASSISTANT
Payer: MEDICAID

## 2019-09-13 DIAGNOSIS — L08.9 DIABETIC FOOT INFECTION (HCC): Primary | ICD-10-CM

## 2019-09-13 DIAGNOSIS — E11.628 DIABETIC FOOT INFECTION (HCC): Primary | ICD-10-CM

## 2019-09-13 LAB
ALBUMIN SERPL-MCNC: 3 G/DL (ref 3.4–5)
ALBUMIN/GLOB SERPL: 0.6 {RATIO} (ref 1–2)
ALP LIVER SERPL-CCNC: 88 U/L (ref 45–117)
ALT SERPL-CCNC: 82 U/L (ref 16–61)
ANION GAP SERPL CALC-SCNC: 5 MMOL/L (ref 0–18)
AST SERPL-CCNC: 35 U/L (ref 15–37)
BASOPHILS # BLD AUTO: 0.04 X10(3) UL (ref 0–0.2)
BASOPHILS NFR BLD AUTO: 0.3 %
BILIRUB SERPL-MCNC: 0.4 MG/DL (ref 0.1–2)
BUN BLD-MCNC: 17 MG/DL (ref 7–18)
BUN/CREAT SERPL: 16.3 (ref 10–20)
CALCIUM BLD-MCNC: 9.9 MG/DL (ref 8.5–10.1)
CHLORIDE SERPL-SCNC: 97 MMOL/L (ref 98–112)
CO2 SERPL-SCNC: 30 MMOL/L (ref 21–32)
CREAT BLD-MCNC: 1.04 MG/DL (ref 0.7–1.3)
DEPRECATED RDW RBC AUTO: 43.5 FL (ref 35.1–46.3)
EOSINOPHIL # BLD AUTO: 0.23 X10(3) UL (ref 0–0.7)
EOSINOPHIL NFR BLD AUTO: 2 %
ERYTHROCYTE [DISTWIDTH] IN BLOOD BY AUTOMATED COUNT: 14.6 % (ref 11–15)
GLOBULIN PLAS-MCNC: 5.4 G/DL (ref 2.8–4.4)
GLUCOSE BLD-MCNC: 135 MG/DL (ref 70–99)
GLUCOSE BLD-MCNC: 145 MG/DL (ref 70–99)
HCT VFR BLD AUTO: 36 % (ref 39–53)
HGB BLD-MCNC: 11.8 G/DL (ref 13–17.5)
IMM GRANULOCYTES # BLD AUTO: 0.03 X10(3) UL (ref 0–1)
IMM GRANULOCYTES NFR BLD: 0.3 %
LYMPHOCYTES # BLD AUTO: 1.27 X10(3) UL (ref 1–4)
LYMPHOCYTES NFR BLD AUTO: 10.8 %
M PROTEIN MFR SERPL ELPH: 8.4 G/DL (ref 6.4–8.2)
MCH RBC QN AUTO: 26.8 PG (ref 26–34)
MCHC RBC AUTO-ENTMCNC: 32.8 G/DL (ref 31–37)
MCV RBC AUTO: 81.8 FL (ref 80–100)
MONOCYTES # BLD AUTO: 0.8 X10(3) UL (ref 0.1–1)
MONOCYTES NFR BLD AUTO: 6.8 %
NEUTROPHILS # BLD AUTO: 9.41 X10 (3) UL (ref 1.5–7.7)
NEUTROPHILS # BLD AUTO: 9.41 X10(3) UL (ref 1.5–7.7)
NEUTROPHILS NFR BLD AUTO: 79.8 %
OSMOLALITY SERPL CALC.SUM OF ELEC: 278 MOSM/KG (ref 275–295)
PLATELET # BLD AUTO: 321 10(3)UL (ref 150–450)
POTASSIUM SERPL-SCNC: 4 MMOL/L (ref 3.5–5.1)
RBC # BLD AUTO: 4.4 X10(6)UL (ref 4.3–5.7)
SODIUM SERPL-SCNC: 132 MMOL/L (ref 136–145)
WBC # BLD AUTO: 11.8 X10(3) UL (ref 4–11)

## 2019-09-13 PROCEDURE — 87077 CULTURE AEROBIC IDENTIFY: CPT | Performed by: PHYSICIAN ASSISTANT

## 2019-09-13 PROCEDURE — 87040 BLOOD CULTURE FOR BACTERIA: CPT | Performed by: PHYSICIAN ASSISTANT

## 2019-09-13 PROCEDURE — 73630 X-RAY EXAM OF FOOT: CPT | Performed by: PHYSICIAN ASSISTANT

## 2019-09-13 PROCEDURE — 87186 SC STD MICRODIL/AGAR DIL: CPT | Performed by: PHYSICIAN ASSISTANT

## 2019-09-13 PROCEDURE — 99285 EMERGENCY DEPT VISIT HI MDM: CPT

## 2019-09-13 PROCEDURE — 87205 SMEAR GRAM STAIN: CPT | Performed by: PHYSICIAN ASSISTANT

## 2019-09-13 PROCEDURE — 87150 DNA/RNA AMPLIFIED PROBE: CPT | Performed by: PHYSICIAN ASSISTANT

## 2019-09-13 PROCEDURE — 36415 COLL VENOUS BLD VENIPUNCTURE: CPT

## 2019-09-13 PROCEDURE — 85025 COMPLETE CBC W/AUTO DIFF WBC: CPT | Performed by: EMERGENCY MEDICINE

## 2019-09-13 PROCEDURE — 80053 COMPREHEN METABOLIC PANEL: CPT

## 2019-09-13 PROCEDURE — 85025 COMPLETE CBC W/AUTO DIFF WBC: CPT

## 2019-09-13 PROCEDURE — 83036 HEMOGLOBIN GLYCOSYLATED A1C: CPT | Performed by: INTERNAL MEDICINE

## 2019-09-13 PROCEDURE — 96365 THER/PROPH/DIAG IV INF INIT: CPT

## 2019-09-13 PROCEDURE — 82962 GLUCOSE BLOOD TEST: CPT

## 2019-09-13 PROCEDURE — 87070 CULTURE OTHR SPECIMN AEROBIC: CPT | Performed by: PHYSICIAN ASSISTANT

## 2019-09-13 PROCEDURE — 80053 COMPREHEN METABOLIC PANEL: CPT | Performed by: EMERGENCY MEDICINE

## 2019-09-13 RX ORDER — DEXTROSE MONOHYDRATE 25 G/50ML
50 INJECTION, SOLUTION INTRAVENOUS
Status: DISCONTINUED | OUTPATIENT
Start: 2019-09-13 | End: 2019-09-17

## 2019-09-13 RX ORDER — BISACODYL 10 MG
10 SUPPOSITORY, RECTAL RECTAL
Status: DISCONTINUED | OUTPATIENT
Start: 2019-09-13 | End: 2019-09-17

## 2019-09-13 RX ORDER — HEPARIN SODIUM 5000 [USP'U]/ML
5000 INJECTION, SOLUTION INTRAVENOUS; SUBCUTANEOUS EVERY 8 HOURS SCHEDULED
Status: DISCONTINUED | OUTPATIENT
Start: 2019-09-13 | End: 2019-09-17

## 2019-09-13 RX ORDER — POLYETHYLENE GLYCOL 3350 17 G/17G
17 POWDER, FOR SOLUTION ORAL DAILY PRN
Status: DISCONTINUED | OUTPATIENT
Start: 2019-09-13 | End: 2019-09-17

## 2019-09-13 RX ORDER — HYDROCODONE BITARTRATE AND ACETAMINOPHEN 5; 325 MG/1; MG/1
2 TABLET ORAL EVERY 4 HOURS PRN
Status: DISCONTINUED | OUTPATIENT
Start: 2019-09-13 | End: 2019-09-17

## 2019-09-13 RX ORDER — SODIUM CHLORIDE 9 MG/ML
INJECTION, SOLUTION INTRAVENOUS CONTINUOUS
Status: DISCONTINUED | OUTPATIENT
Start: 2019-09-13 | End: 2019-09-17

## 2019-09-13 RX ORDER — ONDANSETRON 2 MG/ML
4 INJECTION INTRAMUSCULAR; INTRAVENOUS EVERY 6 HOURS PRN
Status: DISCONTINUED | OUTPATIENT
Start: 2019-09-13 | End: 2019-09-17

## 2019-09-13 RX ORDER — METOCLOPRAMIDE HYDROCHLORIDE 5 MG/ML
10 INJECTION INTRAMUSCULAR; INTRAVENOUS EVERY 8 HOURS PRN
Status: DISCONTINUED | OUTPATIENT
Start: 2019-09-13 | End: 2019-09-17

## 2019-09-13 RX ORDER — ACETAMINOPHEN 325 MG/1
650 TABLET ORAL EVERY 4 HOURS PRN
Status: DISCONTINUED | OUTPATIENT
Start: 2019-09-13 | End: 2019-09-17

## 2019-09-13 RX ORDER — SODIUM PHOSPHATE, DIBASIC AND SODIUM PHOSPHATE, MONOBASIC 7; 19 G/133ML; G/133ML
1 ENEMA RECTAL ONCE AS NEEDED
Status: DISCONTINUED | OUTPATIENT
Start: 2019-09-13 | End: 2019-09-17

## 2019-09-13 RX ORDER — HYDROCODONE BITARTRATE AND ACETAMINOPHEN 5; 325 MG/1; MG/1
1 TABLET ORAL EVERY 4 HOURS PRN
Status: DISCONTINUED | OUTPATIENT
Start: 2019-09-13 | End: 2019-09-17

## 2019-09-13 RX ORDER — DOCUSATE SODIUM 100 MG/1
100 CAPSULE, LIQUID FILLED ORAL 2 TIMES DAILY
Status: DISCONTINUED | OUTPATIENT
Start: 2019-09-13 | End: 2019-09-17

## 2019-09-13 NOTE — ED INITIAL ASSESSMENT (HPI)
Pt arrives with c/o chills. Pt is presently being seen at [facility] for an existing wound to his left heel.

## 2019-09-14 LAB
ALBUMIN SERPL-MCNC: 2.6 G/DL (ref 3.4–5)
ALBUMIN/GLOB SERPL: 0.5 {RATIO} (ref 1–2)
ALP LIVER SERPL-CCNC: 78 U/L (ref 45–117)
ALT SERPL-CCNC: 70 U/L (ref 16–61)
ANION GAP SERPL CALC-SCNC: 8 MMOL/L (ref 0–18)
AST SERPL-CCNC: 31 U/L (ref 15–37)
BASOPHILS # BLD AUTO: 0.06 X10(3) UL (ref 0–0.2)
BASOPHILS NFR BLD AUTO: 0.6 %
BILIRUB SERPL-MCNC: 0.5 MG/DL (ref 0.1–2)
BUN BLD-MCNC: 19 MG/DL (ref 7–18)
BUN/CREAT SERPL: 19.6 (ref 10–20)
CALCIUM BLD-MCNC: 9 MG/DL (ref 8.5–10.1)
CHLORIDE SERPL-SCNC: 102 MMOL/L (ref 98–112)
CO2 SERPL-SCNC: 27 MMOL/L (ref 21–32)
CREAT BLD-MCNC: 0.97 MG/DL (ref 0.7–1.3)
CRP SERPL-MCNC: 13.1 MG/DL (ref ?–0.3)
DEPRECATED RDW RBC AUTO: 43.8 FL (ref 35.1–46.3)
EOSINOPHIL # BLD AUTO: 0.37 X10(3) UL (ref 0–0.7)
EOSINOPHIL NFR BLD AUTO: 3.9 %
ERYTHROCYTE [DISTWIDTH] IN BLOOD BY AUTOMATED COUNT: 14.6 % (ref 11–15)
EST. AVERAGE GLUCOSE BLD GHB EST-MCNC: 223 MG/DL (ref 68–126)
GLOBULIN PLAS-MCNC: 4.8 G/DL (ref 2.8–4.4)
GLUCOSE BLD-MCNC: 137 MG/DL (ref 70–99)
GLUCOSE BLD-MCNC: 169 MG/DL (ref 70–99)
GLUCOSE BLD-MCNC: 172 MG/DL (ref 70–99)
GLUCOSE BLD-MCNC: 211 MG/DL (ref 70–99)
GLUCOSE BLD-MCNC: 227 MG/DL (ref 70–99)
HBA1C MFR BLD HPLC: 9.4 % (ref ?–5.7)
HCT VFR BLD AUTO: 34.2 % (ref 39–53)
HGB BLD-MCNC: 11.1 G/DL (ref 13–17.5)
IMM GRANULOCYTES # BLD AUTO: 0.06 X10(3) UL (ref 0–1)
IMM GRANULOCYTES NFR BLD: 0.6 %
LYMPHOCYTES # BLD AUTO: 1.85 X10(3) UL (ref 1–4)
LYMPHOCYTES NFR BLD AUTO: 19.3 %
M PROTEIN MFR SERPL ELPH: 7.4 G/DL (ref 6.4–8.2)
MCH RBC QN AUTO: 26.6 PG (ref 26–34)
MCHC RBC AUTO-ENTMCNC: 32.5 G/DL (ref 31–37)
MCV RBC AUTO: 82 FL (ref 80–100)
MONOCYTES # BLD AUTO: 0.9 X10(3) UL (ref 0.1–1)
MONOCYTES NFR BLD AUTO: 9.4 %
NEUTROPHILS # BLD AUTO: 6.36 X10 (3) UL (ref 1.5–7.7)
NEUTROPHILS # BLD AUTO: 6.36 X10(3) UL (ref 1.5–7.7)
NEUTROPHILS NFR BLD AUTO: 66.2 %
OSMOLALITY SERPL CALC.SUM OF ELEC: 288 MOSM/KG (ref 275–295)
PLATELET # BLD AUTO: 318 10(3)UL (ref 150–450)
POTASSIUM SERPL-SCNC: 4 MMOL/L (ref 3.5–5.1)
RBC # BLD AUTO: 4.17 X10(6)UL (ref 4.3–5.7)
SED RATE-ML: 86 MM/HR (ref 0–12)
SODIUM SERPL-SCNC: 137 MMOL/L (ref 136–145)
WBC # BLD AUTO: 9.6 X10(3) UL (ref 4–11)

## 2019-09-14 PROCEDURE — 86140 C-REACTIVE PROTEIN: CPT | Performed by: INTERNAL MEDICINE

## 2019-09-14 PROCEDURE — 80053 COMPREHEN METABOLIC PANEL: CPT | Performed by: INTERNAL MEDICINE

## 2019-09-14 PROCEDURE — 82962 GLUCOSE BLOOD TEST: CPT

## 2019-09-14 PROCEDURE — 85652 RBC SED RATE AUTOMATED: CPT | Performed by: INTERNAL MEDICINE

## 2019-09-14 PROCEDURE — 85025 COMPLETE CBC W/AUTO DIFF WBC: CPT | Performed by: INTERNAL MEDICINE

## 2019-09-14 RX ORDER — VALSARTAN 320 MG/1
320 TABLET ORAL DAILY
Status: DISCONTINUED | OUTPATIENT
Start: 2019-09-14 | End: 2019-09-17

## 2019-09-14 RX ORDER — ROSUVASTATIN CALCIUM 20 MG/1
20 TABLET, COATED ORAL NIGHTLY
Status: DISCONTINUED | OUTPATIENT
Start: 2019-09-14 | End: 2019-09-17

## 2019-09-14 RX ORDER — CEFAZOLIN SODIUM/WATER 2 G/20 ML
2 SYRINGE (ML) INTRAVENOUS EVERY 8 HOURS
Status: DISCONTINUED | OUTPATIENT
Start: 2019-09-14 | End: 2019-09-14

## 2019-09-14 RX ORDER — GABAPENTIN 400 MG/1
800 CAPSULE ORAL 3 TIMES DAILY
Status: DISCONTINUED | OUTPATIENT
Start: 2019-09-14 | End: 2019-09-17

## 2019-09-14 NOTE — CONSULTS
[facility] Note: Antimicrobial Weight Dose Adjustment for: piperacillin/tazobactam (Brenda Odor)    Mónica Hill is a 62year old male who has been prescribed piperacillin/tazobactam (ZOSYN) 3.375 gm every 8 hours.   CrCl is estimated creatinine clearance is

## 2019-09-14 NOTE — PLAN OF CARE
Pt admitted with ulcer to Lt heel, various stages of healing, some drainage with foul odor, visits [facility]. Pictures taken placed on chart, consults called to Podiatry and to ID.  Pt aaox4, room air, voiding, started on IV ABX, cultures taken

## 2019-09-14 NOTE — PLAN OF CARE
Sees Podiatrist at [facility], declines inpatient podiatry consult, declines MRI, Dr. Cline Screven answering service notified of consult and MRI cancellation.

## 2019-09-14 NOTE — H&P
[facility] Hospitalist H&P       CC: infected LLE ulcer    PCP: Gerardo Duque MD    History of Present Illness: Pt is a 63 yo with mmp including but not limited to HTN/HL, DMII with chronic OM of LLE, OA obesity who is admitted for infected LLE ulcer.   Ariana Agosto for the 9/13/19 encounter [facility] Encounter):   HYDROcodone-acetaminophen  MG Oral Tab TK 1 T PO Q 4 H PRN PAIN Disp: 30 tablet Rfl: 0   VALSARTAN 320 MG Oral Tab TAKE 1 TABLET(320 MG) BY MOUTH DAILY Disp: 90 tablet Rfl: 0   GABAPENTIN 800 MG Oral T tenderness or deformity. Heart:  Regular rate and rhythm, S1, S2 normal, + possilbe peripheral edema (fatty tissue)   Abdomen:   Soft, non-tender.  Bowel sounds normal. . Non distended, no overt hernias   Extremities: B/l LE with venous stasis changes   S HDS, monitor    **PPx-heparin subq    Outpatient records or previous hospital records reviewed. Further recommendations pending patient's clinical course.   [facility] hospitalist to continue to follow patient while in house    Patient and/or patient's family

## 2019-09-14 NOTE — CONSULTS
[facility] Note: Antimicrobial Weight Dose Adjustment for: piperacillin/tazobactam (Brenda Odor)    Mónica Hill is a 62year old male who has been prescribed piperacillin/tazobactam (ZOSYN) 3.375 g x1 dose.   CrCl is estimated creatinine clearance is 85.8 mL

## 2019-09-14 NOTE — PLAN OF CARE
Rec'd call from Dr. Bertrand Aburto, informed of reason for consult. Order received for Stat MRI of Left Foot. Informed Pt.

## 2019-09-14 NOTE — ED PROVIDER NOTES
Patient Seen in: [facility] Emergency Department    History   Patient presents with:  Cellulitis (integumentary, infectious)    Stated Complaint: pt sent from doctors office for left foot being treated for wound on bottom of *    HPI    Anjali Mcdaniels • Hypertriglyceridemia    • Hypokalemia    • Hypokalemia    • Methicillin susceptible Staphylococcus aureus infection 3/13/2012   • Obesity, unspecified    • Osteoarthrosis, unspecified whether generalized or localized, unspecified site    • Osteomyelitis Left foot: There is a 4 cm x 4 cm ulcer overlying the heel. There is purulent, malodorous drainage noted. The surrounding foot is erythematous and warm.             ED Course     Labs Reviewed   COMP METABOLIC PANEL (14) - Abnormal; Notable for the follow PROCEDURE:  XR FOOT, COMPLETE (MIN 3 VIEWS), LEFT (CPT=73630)  TECHNIQUE:  AP, oblique, and lateral views were obtained. COMPARISON:  [facility], [facility]. [facility], XR FOOT, COMPLETE (MIN 3 VIEWS), LEFT (CPT=73630), 7/23/2019, 15:18.   INDICATIONS: This is a 16-year-old diabetic patient who presents with a diabetic foot infection. Patient had an IV line established and blood work was performed. Is noted to have a glucose of 135. White blood cell count was 11,000.   Patient had blood and wound cultu

## 2019-09-14 NOTE — CONSULTS
INFECTIOUS DISEASE CONSULT NOTE    Priscilla Arredondo Patient Status:  Inpatient    1955 MRN [id number]   [facility] 3SW-A Attending Janes Lemus, [facility] Day # 1 PCP Lisa Gamboa complication, not stated as uncontrolled      Past Surgical History:   Procedure Laterality Date   • HEMORRHOIDECTOMY     • OPEN RX PATELLA FX     • OPEN RX PERIARTIC FX/DISLOC ELBOW     • OTHER SURGICAL HISTORY  2011, 2012    bilateral foot surgery for Ch 7-19 GM/118ML enema 133 mL, 1 enema, Rectal, Once PRN  •  Piperacillin Sod-Tazobactam So (ZOSYN) 4.5 g in dextrose 5 % 100 mL ADD-vantage, 4.5 g, Intravenous, Q8H  •  glucose (DEX4) oral liquid 15 g, 15 g, Oral, Q15 Min PRN **OR** Glucose-Vitamin C (DEX-4) rhythm. No murmurs. Abdomen: Soft, nontender, nondistended. Positive bowel sounds. Musculoskeletal: Full range of motion of all extremities. Trace edema legs. Integument: hyperchromic discoloration to distal LE.  L foot with a plantar heel wound, + d from prior exams. Postsurgical changes are present. Extensive deformity present with collapse of the plantar arch and probable neuropathic joint. There is some soft tissue swelling noted over the dorsum  of the forefoot which is slightly progressed.   No

## 2019-09-14 NOTE — PROGRESS NOTES
Brief Internal Medicine Note    Full Note to Follow      Pt is a 61 yo with LLE wound, ulcer being followed at wound clinic. Foul odor. Says drains white fluid. No f/c/cp/sob/abdominal pain.  No diarrhea or dysuria    Currently on IV zosyn    NAD  LUCIEN, aries

## 2019-09-15 LAB
ANION GAP SERPL CALC-SCNC: 8 MMOL/L (ref 0–18)
BASOPHILS # BLD AUTO: 0.04 X10(3) UL (ref 0–0.2)
BASOPHILS NFR BLD AUTO: 0.6 %
BUN BLD-MCNC: 18 MG/DL (ref 7–18)
BUN/CREAT SERPL: 19.6 (ref 10–20)
CALCIUM BLD-MCNC: 8.5 MG/DL (ref 8.5–10.1)
CHLORIDE SERPL-SCNC: 103 MMOL/L (ref 98–112)
CO2 SERPL-SCNC: 27 MMOL/L (ref 21–32)
CREAT BLD-MCNC: 0.92 MG/DL (ref 0.7–1.3)
DEPRECATED RDW RBC AUTO: 44.4 FL (ref 35.1–46.3)
EOSINOPHIL # BLD AUTO: 0.37 X10(3) UL (ref 0–0.7)
EOSINOPHIL NFR BLD AUTO: 5.3 %
ERYTHROCYTE [DISTWIDTH] IN BLOOD BY AUTOMATED COUNT: 14.6 % (ref 11–15)
GLUCOSE BLD-MCNC: 174 MG/DL (ref 70–99)
GLUCOSE BLD-MCNC: 207 MG/DL (ref 70–99)
GLUCOSE BLD-MCNC: 210 MG/DL (ref 70–99)
GLUCOSE BLD-MCNC: 219 MG/DL (ref 70–99)
GLUCOSE BLD-MCNC: 246 MG/DL (ref 70–99)
HAV IGM SER QL: 2.2 MG/DL (ref 1.6–2.6)
HCT VFR BLD AUTO: 34.3 % (ref 39–53)
HGB BLD-MCNC: 11.1 G/DL (ref 13–17.5)
IMM GRANULOCYTES # BLD AUTO: 0.04 X10(3) UL (ref 0–1)
IMM GRANULOCYTES NFR BLD: 0.6 %
LYMPHOCYTES # BLD AUTO: 1.48 X10(3) UL (ref 1–4)
LYMPHOCYTES NFR BLD AUTO: 21.3 %
MCH RBC QN AUTO: 27.1 PG (ref 26–34)
MCHC RBC AUTO-ENTMCNC: 32.4 G/DL (ref 31–37)
MCV RBC AUTO: 83.7 FL (ref 80–100)
MONOCYTES # BLD AUTO: 0.55 X10(3) UL (ref 0.1–1)
MONOCYTES NFR BLD AUTO: 7.9 %
NEUTROPHILS # BLD AUTO: 4.47 X10 (3) UL (ref 1.5–7.7)
NEUTROPHILS # BLD AUTO: 4.47 X10(3) UL (ref 1.5–7.7)
NEUTROPHILS NFR BLD AUTO: 64.3 %
OSMOLALITY SERPL CALC.SUM OF ELEC: 296 MOSM/KG (ref 275–295)
PLATELET # BLD AUTO: 320 10(3)UL (ref 150–450)
POTASSIUM SERPL-SCNC: 4.2 MMOL/L (ref 3.5–5.1)
RBC # BLD AUTO: 4.1 X10(6)UL (ref 4.3–5.7)
SODIUM SERPL-SCNC: 138 MMOL/L (ref 136–145)
WBC # BLD AUTO: 7 X10(3) UL (ref 4–11)

## 2019-09-15 PROCEDURE — 80048 BASIC METABOLIC PNL TOTAL CA: CPT | Performed by: HOSPITALIST

## 2019-09-15 PROCEDURE — 85025 COMPLETE CBC W/AUTO DIFF WBC: CPT | Performed by: HOSPITALIST

## 2019-09-15 PROCEDURE — 82962 GLUCOSE BLOOD TEST: CPT

## 2019-09-15 PROCEDURE — 83735 ASSAY OF MAGNESIUM: CPT | Performed by: HOSPITALIST

## 2019-09-15 NOTE — PLAN OF CARE
Vitals stable, pain well controlled with PO pain meds, wet to moist dressing changed, tolerated well, IV Abx. Plan:  awaiting culture results, ID managing Abx.

## 2019-09-15 NOTE — PROGRESS NOTES
INFECTIOUS DISEASE PROGRESS NOTE    Nat Hightower Patient Status:  Inpatient    1955 MRN [id number]   [facility] 3SW-A Attending Graciela Mc, *   Hosp Day # 2 PCP Khris Segovia •  glucose (DEX4) oral liquid 15 g, 15 g, Oral, Q15 Min PRN **OR** Glucose-Vitamin C (DEX-4) chewable tab 4 tablet, 4 tablet, Oral, Q15 Min PRN **OR** dextrose 50 % injection 50 mL, 50 mL, Intravenous, Q15 Min PRN **OR** glucose (DEX4) oral liquid 30 g, 30 CO2 30.0 27.0 27.0   ALKPHO 88 78  --    AST 35 31  --    ALT 82* 70*  --    BILT 0.4 0.5  --    TP 8.4* 7.4  --        Microbiology    Reviewed in EMR,   Hospital Encounter on 09/13/19   1.  BLOOD CULTURE     Status: Abnormal (Preliminary result)    [facility] CONCLUSION:  The appearance of the hindfoot is similar to prior exams with findings suggestive of neuropathic joint. Within the forefoot there is soft tissue swelling however no cortical erosion is seen. Cellulitis can be considered.   If there is persist

## 2019-09-15 NOTE — PLAN OF CARE
Pt A&Ox4. On ra  spot check. Pt refusing scds. Tolerating diet. Voiding in bathroom or urinal. Up with stand by assist cam boot at times at other times pt lifting R foot (maintaing nwb to RLE). bm today. Pain controlled on pain meds. Vss.  Pt verbalized

## 2019-09-15 NOTE — PLAN OF CARE
Pt doing well, IV ABX continue, ID following, see prior note regarding blood cultures. VSS, dressing to Lt foot remains CDI w/ no drainage. Call light within reach, all safety measures in place, will CTM.

## 2019-09-15 NOTE — PLAN OF CARE
Lab contacted this writer regarding + blood cultures from 9/13, finding for Anaerobic was gram+ clusters and pairs, DNA showed no target organism. Paged on call ID Dr. Larry Parker phoned back. Reported findings, she said for now she will keep Pt on Unasyn.

## 2019-09-15 NOTE — PROGRESS NOTES
[facility] Hospitalist Progress Note     PCP: Gloria Gatica MD    SUBJECTIVE:  No CP, SOB, or N/V.    OBJECTIVE:  Temp:  [97.8 °F (36.6 °C)-98.5 °F (36.9 °C)] 97.8 °F (36.6 °C)  Pulse:  [54-58] 58  Resp:  [15-17] 17  BP: (120-137)/(51-66) 130/66    Intake/Output Oral BID   • Insulin Aspart Pen  1-5 Units Subcutaneous TID CC and HS     • sodium chloride 83 mL/hr at 09/13/19 1353     acetaminophen **OR** HYDROcodone-acetaminophen **OR** HYDROcodone-acetaminophen, ondansetron HCl, Metoclopramide HCl, PEG 3350, rodo

## 2019-09-16 LAB
GLUCOSE BLD-MCNC: 147 MG/DL (ref 70–99)
GLUCOSE BLD-MCNC: 156 MG/DL (ref 70–99)
GLUCOSE BLD-MCNC: 204 MG/DL (ref 70–99)
GLUCOSE BLD-MCNC: 238 MG/DL (ref 70–99)

## 2019-09-16 PROCEDURE — 82962 GLUCOSE BLOOD TEST: CPT

## 2019-09-16 RX ORDER — AMOXICILLIN AND CLAVULANATE POTASSIUM 875; 125 MG/1; MG/1
1 TABLET, FILM COATED ORAL 2 TIMES DAILY
Qty: 20 TABLET | Refills: 0 | Status: SHIPPED | OUTPATIENT
Start: 2019-09-16 | End: 2019-09-26

## 2019-09-16 NOTE — PAYOR COMM NOTE
--------------  ADMISSION REVIEW     Payor: Lane Mckenzie #:  [id number]  Authorization Number: [id number]    Admit date: 9/13/19  Admit time: 2143       Admitting Physician: Dayami Burrell MD  Attending y The patient's medication list, past medical history and social history elements is as listed in today's nurse's notes are reviewed and agree.  The patient's family history is reviewed and is noncontributory to the presenting problem, except as indicated as Constitutional and vital signs reviewed. All other systems reviewed and negative except as noted above.     Physical Exam     ED Triage Vitals [09/13/19 1806]   BP (!) 168/75   Pulse 76   Resp 16   Temp 98 °F (36.7 °C)   Temp src Temporal   SpO2 98 % Please view results for these tests on the individual orders.    RAINBOW DRAW BLUE   RAINBOW DRAW LAVENDER   RAINBOW DRAW LIGHT GREEN   RAINBOW DRAW GOLD   BLOOD CULTURE   BLOOD CULTURE   AEROBIC BACTERIAL CULTURE          Xr Foot, Complete (min 3 Views), L This is a 25-year-old diabetic patient who presents with a diabetic foot infection. Patient had an IV line established and blood work was performed. Is noted to have a glucose of 135. White blood cell count was 11,000.   Patient had blood and wound cultu :  Tony Gonzalez MD (Physician)    Related Notes:  Original Note by Tony Gonzalze MD (Physician) filed at 9/14/2019 10:33 AM                [facility] Hospitalist H&P       CC: infected LLE ulcer    PCP: Grover Manjarrez MD    History of Pr General:  Alert, NAD, appears stated age   Head:  Normocephalic, without obvious abnormality, atraumatic. Eyes:  Sclera anicteric,  EOMs intact. Lids wnl.  PE   Ears, nose, throat:  external ears and nose within normal limits, hearing intact       Neck: S -ID and podiatry on consult, appreciate  -BC and wound cx pending  -NPO, IVF- advancement of diet if no procedure today  -MRI L foot pending  -elevated ESR and CRP    **DMII-uncontrolled  -continue insulin.  Iss, accuchecks  -HbA1c 9.4    **HTN/HL-stable - Adithya Elkins is a a(n) 62year old male with longstanding type 2 DM with neuropathy and Charcot's arthropathy, s/p multiple interventions in the past by Dr Tg Jackson at [facility], here with a worsening L foot wound which started a few months ago.  Had been f/u at Problem Relation Age of Onset   • Heart Disease Father     • Diabetes Father     • Heart Disorder Mother           CHF   • Pulmonary Disease Mother           COPD   • Heart Surgery Mother         reports that he has never smoked.  He has never used smokeles •  glucose (DEX4) oral liquid 15 g, 15 g, Oral, Q15 Min PRN **OR** Glucose-Vitamin C (DEX-4) chewable tab 4 tablet, 4 tablet, Oral, Q15 Min PRN **OR** dextrose 50 % injection 50 mL, 50 mL, Intravenous, Q15 Min PRN **OR** glucose (DEX4) oral liquid 30 g, 30 Integument: hyperchromic discoloration to distal LE. L foot with a plantar heel wound, + drainage, wound is deep, bone is palpable.  + surrounding erythema     Laboratory Data:          Recent Labs   Lab 09/13/19  1849 09/14/19  0522   RBC 4.40 4.17*   HGB PROCEDURE:  XR FOOT, COMPLETE (MIN 3 VIEWS), LEFT (CPT=73630)  TECHNIQUE:  AP, oblique, and lateral views were obtained. COMPARISON:  [facility], [facility]. [facility], XR FOOT, COMPLETE (MIN 3 VIEWS), LEFT (CPT=73630), 7/23/2019, 15:18.   INDICATIONS: - d/w pt, exam is concerning for OM and may need surgery. He wants to see Dr Simin Malagon, does not want to see podiatry here. Does not want MRI done here either since he feels like Dr Simin Malagon would probably order at [facility] anyway.  I think is reasonable to treat th HGB 11.8* 11.1* 11.1*   MCV 81.8 82.0 83.7   .0 318.0 320.0               Recent Labs   Lab 09/13/19  1849 09/14/19  0522 09/15/19  0531   * 137 138   K 4.0 4.0 4.2   CL 97* 102 103   CO2 30.0 27.0 27.0   BUN 17 19* 18   CREATSERUM 1.04 0.97 0 -wound cx with s. Aureus for now. Bcx with [facility] - awaiting further info  -there is no MRI ordered. Suspect this is OM given bone is easily plpable  -elevated ESR and CRP     **DMII-uncontrolled  -continue insulin.   accuchecks  -will restart his usual pran 9/16/2019 0011 Given 1 tablet Oral Richard Navarro RN    9/15/2019 2002 Given 1 tablet Oral Richard Navarro RN      Insulin Aspart Pen (NOVOLOG) 100 UNIT/ML flexpen 1-5 Units     Date Action Dose Route User    9/16/2019 1217 Given 3 Units Subcutaneous (Left U

## 2019-09-16 NOTE — PLAN OF CARE
RECD ALERT ,AWAKE, ORIENTED. BLE DISCOLORED. PULSES PALPABLE. DRG IN PLACE. WILL WAIT FOR ID FOR PLAN OF CARE. CONTINUE IV ABT AS ORDER. PT AWARE OF PLAN OF CARE. CALL LIGHT W/IN REACH.  TO CALL FOR ALL NEEDS AND ASSISTANCE

## 2019-09-16 NOTE — PROGRESS NOTES
[facility] Hospitalist Progress Note     PCP: Michael Silveira MD    SUBJECTIVE:  No CP, SOB, or N/V.    OBJECTIVE:  Temp:  [97.8 °F (36.6 °C)-98.5 °F (36.9 °C)] 97.9 °F (36.6 °C)  Pulse:  [51-60] 51  Resp:  [16] 16  BP: (126-152)/(52-65) 150/65    Intake/Output: BID   • Insulin Aspart Pen  1-5 Units Subcutaneous TID CC and HS     • sodium chloride 83 mL/hr at 09/13/19 3199     acetaminophen **OR** HYDROcodone-acetaminophen **OR** HYDROcodone-acetaminophen, ondansetron HCl, Metoclopramide HCl, PEG 3350, magnesium h

## 2019-09-16 NOTE — PLAN OF CARE
Pt is AAOX4, dressing to Lt foot, is wet to dry dressing, wrapped in Kerlix from heel to midfoot. VSS, voiding freely, IV ABX infusing, prelim cultures back, but not final yet. ID notified and is now following.  Plan is to figure out organism if possible, a

## 2019-09-16 NOTE — PROGRESS NOTES
INFECTIOUS DISEASE PROGRESS NOTE    Fior Garcia Patient Status:  Inpatient    1955 MRN [id number]   [facility] 3SW-A Attending Marcus Mata, *   Hosp Day # 3 PCP Anant Yarbrough Glucose-Vitamin C (DEX-4) chewable tab 4 tablet, 4 tablet, Oral, Q15 Min PRN **OR** dextrose 50 % injection 50 mL, 50 mL, Intravenous, Q15 Min PRN **OR** glucose (DEX4) oral liquid 30 g, 30 g, Oral, Q15 Min PRN **OR** Glucose-Vitamin C (DEX-4) chewable tab --    TP 8.4* 7.4  --        Microbiology    Reviewed in EMR,   Hospital Encounter on 09/13/19   1.  BLOOD CULTURE     Status: Abnormal (Preliminary result)    Collection Time: 09/13/19  8:03 PM   Result Value Ref Range    Blood Culture Result Pending (A) Extensive deformity present with collapse of the plantar arch and probable neuropathic joint. There is some soft tissue swelling noted over the dorsum  of the forefoot which is slightly progressed. No convincing cortical erosion is seen in this region.

## 2019-09-17 ENCOUNTER — APPOINTMENT (OUTPATIENT)
Dept: WOUND CARE | Facility: HOSPITAL | Age: 64
End: 2019-09-17
Attending: NURSE PRACTITIONER
Payer: MEDICAID

## 2019-09-17 VITALS
TEMPERATURE: 98 F | WEIGHT: 274.94 LBS | DIASTOLIC BLOOD PRESSURE: 50 MMHG | OXYGEN SATURATION: 99 % | BODY MASS INDEX: 35 KG/M2 | SYSTOLIC BLOOD PRESSURE: 156 MMHG | RESPIRATION RATE: 16 BRPM | HEART RATE: 63 BPM

## 2019-09-17 LAB
GLUCOSE BLD-MCNC: 183 MG/DL (ref 70–99)
GLUCOSE BLD-MCNC: 228 MG/DL (ref 70–99)

## 2019-09-17 PROCEDURE — 82962 GLUCOSE BLOOD TEST: CPT

## 2019-09-17 NOTE — PAYOR COMM NOTE
--------------  CONTINUED STAY REVIEW    Payor: Lane Mckenzie #:  [id number]  Authorization Number: [id number]    Admit date: 9/13/19  Admit time: 2143    Admitting Physician: Graciela Mc MD  Attending Ph

## 2019-09-17 NOTE — DIETARY NOTE
Clinical Nutrition    RD offered pt DM nutrition education prior to discharge 2/2 DM complications w/ diabetic foot infection. Pt A1c 9.4%. Pt not interested in education. RD available PRN.     Carmen Weaver MS, RD, LDN  Pager # [phone]

## 2019-09-17 NOTE — PLAN OF CARE
Pt in chair. Ambulated well with SB assist to stand pivot to chair. NWB to LLE. Cam boot. Daily wet to dry dressing. Pt states he has follow up with wound care at [facility] this afternoon. Plans to d/c home today on PO augmentin per Dr. Oksana Peñaloza.  Will follow u

## 2019-09-17 NOTE — PLAN OF CARE
Pain to lt heel ulcer minimal. Takes [place] prn. Dsg intact, pt ambulatory with CAM boot to lt leg. VSS, afebrile, tolerating IV abx. Plan is d/c home when ready.

## 2019-09-17 NOTE — PLAN OF CARE
D/c paperwork given, questions answered and concerns addressed. Script for augmentin called into pharmacy. Plan to follow up with surgeon on Friday. Will d/c home via personal car.

## 2019-09-17 NOTE — PAYOR COMM NOTE
--------------  CONTINUED STAY REVIEW    Payor: Lane Mckenzie #:  [id number]  Authorization Number: [id number]    Admit date: 9/13/19  Admit time: 2143    Admitting Physician: Justin Pendleton MD  Attending Ph

## 2019-09-17 NOTE — PLAN OF CARE
Wound care called for dressing change. Pt has appt with his podiatrist on Friday and wanted to have \"silverdene dressing put on\" because that was what [place] wound care clinic had planned for him for follow up once he leaves here.

## 2019-09-17 NOTE — DISCHARGE SUMMARY
General Medicine Discharge Summary     Patient ID:  Chinedu Lagunas  62year old  4/9/1955    Admit date: 9/13/2019    Discharge date and time: 9/17/2019    Attending Physician: DO Shaniqua Shah home, will f/u with his foot surgeon later this week.     Consults: IP CONSULT TO HOSPITALIST  IP CONSULT TO INFECTIOUS DISEASE  CONSULT TO WOUND OSTOMY    Operative Procedures:   None      Patient instructions:      Current Discharge Medication List    STA Code      Exam on day of discharge:      09/17/19  0350   BP: 126/44   Pulse: 51   Resp: 18   Temp: 98.6 °F (37 °C)       Gen: No acute distress, alert and oriented x3, no focal neurologic deficits  Pulm: Lungs clear, normal respiratory effort  CV: Heart w

## 2019-09-18 NOTE — PAYOR COMM NOTE
--------------  DISCHARGE REVIEW    Payor: Lane Mckenzie #:  [id number]  Authorization Number: [id number]    Admit date: 9/13/19  Admit time:  2143  Discharge Date: 9/17/2019 12:46 PM     Admitting Physician: Lily Cardona

## 2019-09-23 PROBLEM — Z79.4 TYPE 2 DIABETES MELLITUS WITH FOOT ULCER, WITH LONG-TERM CURRENT USE OF INSULIN (HCC): Status: ACTIVE | Noted: 2019-09-23

## 2019-09-23 PROBLEM — L97.509 TYPE 2 DIABETES MELLITUS WITH FOOT ULCER, WITH LONG-TERM CURRENT USE OF INSULIN (HCC): Status: ACTIVE | Noted: 2019-09-23

## 2019-09-23 PROBLEM — E11.621 TYPE 2 DIABETES MELLITUS WITH FOOT ULCER, WITH LONG-TERM CURRENT USE OF INSULIN (HCC): Status: ACTIVE | Noted: 2019-09-23

## 2019-09-24 ENCOUNTER — APPOINTMENT (OUTPATIENT)
Dept: WOUND CARE | Facility: HOSPITAL | Age: 64
End: 2019-09-24
Attending: NURSE PRACTITIONER
Payer: MEDICAID

## 2019-10-01 ENCOUNTER — APPOINTMENT (OUTPATIENT)
Dept: WOUND CARE | Facility: HOSPITAL | Age: 64
End: 2019-10-01
Attending: NURSE PRACTITIONER
Payer: MEDICAID

## 2019-10-08 ENCOUNTER — APPOINTMENT (OUTPATIENT)
Dept: WOUND CARE | Facility: HOSPITAL | Age: 64
End: 2019-10-08
Attending: NURSE PRACTITIONER
Payer: MEDICAID

## 2020-01-01 ENCOUNTER — EXTERNAL RECORD (OUTPATIENT)
Dept: OTHER | Age: 65
End: 2020-01-01

## 2020-01-14 ENCOUNTER — OFFICE VISIT (OUTPATIENT)
Dept: WOUND CARE | Facility: HOSPITAL | Age: 65
End: 2020-01-14
Attending: NURSE PRACTITIONER
Payer: MEDICAID

## 2020-01-14 DIAGNOSIS — L97.509 TYPE 2 DIABETES MELLITUS WITH FOOT ULCER, WITH LONG-TERM CURRENT USE OF INSULIN (HCC): Primary | ICD-10-CM

## 2020-01-14 DIAGNOSIS — E13.610 CHARCOT'S JOINT DISEASE DUE TO SECONDARY DIABETES (HCC): ICD-10-CM

## 2020-01-14 DIAGNOSIS — E11.42 DIABETIC PERIPHERAL NEUROPATHY (HCC): ICD-10-CM

## 2020-01-14 DIAGNOSIS — L97.509 DIABETIC FOOT ULCER (HCC): ICD-10-CM

## 2020-01-14 DIAGNOSIS — I87.2 VENOUS INSUFFICIENCY (CHRONIC) (PERIPHERAL): ICD-10-CM

## 2020-01-14 DIAGNOSIS — S91.302A OPEN WOUND OF HEEL, LEFT, INITIAL ENCOUNTER: ICD-10-CM

## 2020-01-14 DIAGNOSIS — Z79.4 TYPE 2 DIABETES MELLITUS WITH FOOT ULCER, WITH LONG-TERM CURRENT USE OF INSULIN (HCC): Primary | ICD-10-CM

## 2020-01-14 DIAGNOSIS — E11.621 DIABETIC FOOT ULCER (HCC): ICD-10-CM

## 2020-01-14 DIAGNOSIS — E11.621 TYPE 2 DIABETES MELLITUS WITH FOOT ULCER, WITH LONG-TERM CURRENT USE OF INSULIN (HCC): Primary | ICD-10-CM

## 2020-01-14 PROCEDURE — 97597 DBRDMT OPN WND 1ST 20 CM/<: CPT

## 2020-01-14 PROCEDURE — 97162 PT EVAL MOD COMPLEX 30 MIN: CPT

## 2020-01-14 NOTE — PROGRESS NOTES
Subjective    Chief Complaint  This information was obtained from the patient  1/14/2020 \"I feel ok I have been treating the wound with hydrofera blue and kerlix wraps with a CAM boot\".     Allergies  Coffee    HPI  This information was obtained from the 7/16/2019 This 62year old male comes in today with a diabetic ulcer on his left heel he has had for > 2 wks. Concerned because he has had prolonged wounds of the foot in the past and he has been a previous [facility] patient.   MD Requesting wound clini Admelog SoloStar U-100 Insulin - subcutaneous 100 unit/mL insulin pen three times daily  Basaglar KwikPen U-100 Insulin - subcutaneous 100 unit/mL (3 mL) insulin pen take at bedtime  hydrocodone-acetaminophen - oral 10 mg-325 mg tablet every 6 hours as nee Wound #4 Left, Plantar Foot is a James Grade 2 Diabetic Ulcer and has received a status of Not Healed. Initial wound encounter measurements are 0.4cm length x 0.4cm width x 1.2cm depth, with an area of 0.16 sq cm and a volume of 0.192 cubic cm.  There is a Pt presents with a L Charcot foot deformity with a PMH of DM with fluctuating blood sugars. Pt was previously treated in the wound care clinic and all wounds were resolved.  Pt has returned to [facility] with 3 open wounds of the L foot all wound are [place] ________________________________________________________________  Scheduling Information:  **REFERRAL REQUEST**     Your physician has referred you to a specialist. Your physician or the clinic staff will provide you with the phone number you should call t Plan  Pt will continue to be seen 1-2x/week for 10-12 weeks for selective debridement (16962BE, [id number]), advanced wound dressings, compression wrapping (38116TP), and  possible SAMMIE (26880BR), and possible epifix (-RsrZit, [id number]) to achieve the a Achieve wound closure to promote return to normal functional activities  Wound #3a (Left Heel)  . Wound Treatment Note  Limb cleansed using Betasept and water (1)  Cleansed wound and periwound with non-cytotoxic agent.  using Wound Cleanser Spray (1)  Murray Cleansed wound and periwound with non-cytotoxic agent.  using Wound Cleanser Spray (1)  Performed selective sharp debridement to wound  Tissue and other material debrided: using Callus (1), Devitalized dermis (1), Devitalized epidermis (1), Eschar (1), Fibr

## 2020-01-20 ENCOUNTER — OFFICE VISIT (OUTPATIENT)
Dept: WOUND CARE | Facility: HOSPITAL | Age: 65
End: 2020-01-20
Attending: NURSE PRACTITIONER
Payer: MEDICAID

## 2020-01-20 DIAGNOSIS — L97.509 DIABETIC FOOT ULCER (HCC): Primary | ICD-10-CM

## 2020-01-20 DIAGNOSIS — E11.621 TYPE 2 DIABETES MELLITUS WITH FOOT ULCER, WITH LONG-TERM CURRENT USE OF INSULIN (HCC): ICD-10-CM

## 2020-01-20 DIAGNOSIS — S91.302A OPEN WOUND OF HEEL, LEFT, INITIAL ENCOUNTER: ICD-10-CM

## 2020-01-20 DIAGNOSIS — I87.2 VENOUS INSUFFICIENCY (CHRONIC) (PERIPHERAL): ICD-10-CM

## 2020-01-20 DIAGNOSIS — L97.509 TYPE 2 DIABETES MELLITUS WITH FOOT ULCER, WITH LONG-TERM CURRENT USE OF INSULIN (HCC): ICD-10-CM

## 2020-01-20 DIAGNOSIS — E13.610 CHARCOT'S JOINT DISEASE DUE TO SECONDARY DIABETES (HCC): ICD-10-CM

## 2020-01-20 DIAGNOSIS — E11.621 DIABETIC FOOT ULCER (HCC): Primary | ICD-10-CM

## 2020-01-20 DIAGNOSIS — Z79.4 TYPE 2 DIABETES MELLITUS WITH FOOT ULCER, WITH LONG-TERM CURRENT USE OF INSULIN (HCC): ICD-10-CM

## 2020-01-20 PROCEDURE — 97597 DBRDMT OPN WND 1ST 20 CM/<: CPT

## 2020-01-20 NOTE — PROGRESS NOTES
Subjective    Chief Complaint  This information was obtained from the patient  1/20/2020 \"I am going to work part time until my heel gets a little better\".     Allergies  Coffee    HPI  This information was obtained from the patient  PT reports having not 7/16/2019 This 62year old male comes in today with a diabetic ulcer on his left heel he has had for > 2 wks. Concerned because he has had prolonged wounds of the foot in the past and he has been a previous [facility] patient.   MD Requesting wound clini Wound #4 Left, Plantar Foot is a James Grade 2 Diabetic Ulcer and has received a status of Not Healed. There is a moderate amount of sero-sanguineous drainage noted which has no odor. The patient reports no wound pain due to the wound being insensate.  The V81.030I - Unspecified open wound, left foot, initial encounter        Plan  Continue per POC as above to resolve wound and transition to self care as progress allows. Pt will need a follow up xray next month in 2-3 wks. Additional Orders:     Follow-Up .Wound Treatment Note  Limb cleansed using Betasept and water (1)  Cleansed wound and periwound with non-cytotoxic agent.  using Wound Cleanser Spray (1)  Applied enzymatic agent to base of wound bed. using Collagenase Santyl (1)  Applied Primary Wound Dres Applied Offloading device. using 1/2\" foam padding applied to periwound (1), 1/4\" thick adhesive felt applied to periwound (2)  Offloading  Applied Offloading device.   Applied offloading shoe wear: using CAM boot (1)  Selective / Non-selective debridemen

## 2020-01-24 ENCOUNTER — OFFICE VISIT (OUTPATIENT)
Dept: WOUND CARE | Facility: HOSPITAL | Age: 65
End: 2020-01-24
Attending: NURSE PRACTITIONER
Payer: MEDICAID

## 2020-01-24 DIAGNOSIS — E11.621 DIABETIC FOOT ULCER (HCC): ICD-10-CM

## 2020-01-24 DIAGNOSIS — L97.509 DIABETIC FOOT ULCER (HCC): ICD-10-CM

## 2020-01-24 DIAGNOSIS — E13.610 CHARCOT'S JOINT DISEASE DUE TO SECONDARY DIABETES (HCC): Primary | ICD-10-CM

## 2020-01-24 PROCEDURE — 97597 DBRDMT OPN WND 1ST 20 CM/<: CPT

## 2020-01-24 NOTE — PROGRESS NOTES
Subjective    Chief Complaint  This information was obtained from the patient  1/24/2020 \"I have cut back on work to part time to unload my foot and I am reducing the walking and wearing the CAM boot like we discussed\".     Allergies  Coffee    HPI  This 7/16/2019 This 62year old male comes in today with a diabetic ulcer on his left heel he has had for > 2 wks. Concerned because he has had prolonged wounds of the foot in the past and he has been a previous [facility] patient.   MD Requesting wound clini Wound #4 Left, Plantar Foot is a James Grade 2 Diabetic Ulcer and has received a status of Not Healed. There is a moderate amount of sero-sanguineous drainage noted which has no odor. The patient reports no wound pain due to the wound being insensate.  The Cleansed wound and periwound with non-cytotoxic agent. using Wound Cleanser Spray (1)  Applied enzymatic agent to base of wound bed. using Collagenase Santyl (1)  Applied Primary Wound Dressing.  using Aquacel AG Extra 4x5 hydrofiber (4)  Applied Secondary Applied Offloading device. using 1/2\" foam padding applied to periwound (1), 1/4\" thick adhesive felt applied to periwound (2)  Offloading  Applied Offloading device.   Applied offloading shoe wear: using CAM boot (1)  Selective / Non-selective debridemen Tissue and other material debrided: using Callus (1), Devitalized dermis (1), Devitalized epidermis (1), Eschar (1), Fibrin (1), Slough (1)  Debridement instrument used: using Scalpel (1)  Assured hemostasis using Silver nitrate (1)  Written PT Goals - . Sh

## 2020-01-27 ENCOUNTER — OFFICE VISIT (OUTPATIENT)
Dept: WOUND CARE | Facility: HOSPITAL | Age: 65
End: 2020-01-27
Attending: NURSE PRACTITIONER
Payer: MEDICAID

## 2020-01-27 DIAGNOSIS — L97.509 DIABETES MELLITUS DUE TO UNDERLYING CONDITION WITH FOOT ULCER (HCC): Primary | ICD-10-CM

## 2020-01-27 DIAGNOSIS — L97.522 NON-HEALING ULCER OF LEFT FOOT WITH FAT LAYER EXPOSED (HCC): ICD-10-CM

## 2020-01-27 DIAGNOSIS — E08.621 DIABETES MELLITUS DUE TO UNDERLYING CONDITION WITH FOOT ULCER (HCC): Primary | ICD-10-CM

## 2020-01-27 PROCEDURE — 29581 APPL MULTLAYER CMPRN SYS LEG: CPT

## 2020-01-27 NOTE — PROGRESS NOTES
Subjective    Chief Complaint  This information was obtained from the patient  1/27/2020 I am wearing my CAM boot regularly and trying to stay off the heel as much as    Allergies  Coffee    HPI  This information was obtained from the patient  PT reports h 7/16/2019 This 62year old male comes in today with a diabetic ulcer on his left heel he has had for > 2 wks. Concerned because he has had prolonged wounds of the foot in the past and he has been a previous [facility] patient.   MD Requesting wound clini The periwound skin color is normal. The periwound skin exhibited: Edema, Callus, Friable, Moist. The periwound skin did not exhibit: Dry/Scaly, Maceration. The temperature of the periwound skin is Warm.  Periwound skin does not exhibit signs or symptoms of Pt presents with non more purulence present when vickie wound edge were compressed. Serosanguious exudation present. CMP, CRP, CBC, Sed rate, A1C , 3 view foot Xray were all ordered to R/O osteomyelitis and complication related to infection.  The inferior wou Applied Offloading device. using 1/2\" foam padding applied to periwound (1), 1/4\" thick adhesive felt applied to periwound (2)  Offloading  Applied Offloading device.   Applied offloading shoe wear: using CAM boot (1)  Selective / Non-selective debridemen Cleansed wound and periwound with non-cytotoxic agent. using Wound Cleanser Spray (1)  Applied Primary Wound Dressing. using Aquacel AG Extra 4x5 hydrofiber (3), Hydrofera Blue Classic 4x4 (1)  Applied Secondary Wound Dressing.  using Gauze (sterile) 4x4 (1 Written PT Goals - Long Term (8-12 weeks)  Reduce wound area by 100%  Wound closure  Patient and/or family to be independent with use of compression garments.   Patient to obtain proper diabetic shoe wear  Achieve wound closure to promote return to normal f

## 2020-01-31 ENCOUNTER — OFFICE VISIT (OUTPATIENT)
Dept: WOUND CARE | Facility: HOSPITAL | Age: 65
End: 2020-01-31
Attending: NURSE PRACTITIONER
Payer: MEDICAID

## 2020-01-31 DIAGNOSIS — E11.621 DIABETIC FOOT ULCER (HCC): ICD-10-CM

## 2020-01-31 DIAGNOSIS — L08.9 DIABETIC FOOT INFECTION (HCC): ICD-10-CM

## 2020-01-31 DIAGNOSIS — E11.628 DIABETIC FOOT INFECTION (HCC): ICD-10-CM

## 2020-01-31 DIAGNOSIS — L97.509 DIABETIC FOOT ULCER (HCC): ICD-10-CM

## 2020-01-31 DIAGNOSIS — E13.610 CHARCOT'S JOINT DISEASE DUE TO SECONDARY DIABETES (HCC): Primary | ICD-10-CM

## 2020-01-31 PROCEDURE — 97597 DBRDMT OPN WND 1ST 20 CM/<: CPT

## 2020-01-31 NOTE — PROGRESS NOTES
Subjective    Chief Complaint  This information was obtained from the patient  1/31/2020 \"I did all my testing at [facility] and I have no osteo of the foot.  My numbers did come out irregular for my labs so they are now ruling out C diff and parasitic infecti has had for > 2 wks. Concerned because he has had prolonged wounds of the foot in the past and he has been a previous [facility] patient.   MD Requesting wound clinic referral.  Pt reports he had a stone in his shoe that he did not notice that caused the wound care at [address] [place] [address] and pt will transfer care to allow for casting.  The pt will benefit from casting to offload and resolve his wound and prescription options for an appropriate off loading footwear i.e. CROW boot or comparable foot wear as medical progre using N/A (1)  Written PT Goals - . Short Term (4-6 weeks)  Reduce necrosis by 75%  Decrease depth by 75%  Written PT Goals - Long Term (8-12 weeks)  Reduce wound area by 100%  Wound closure  Patient and/or family to be independent with use of compression g

## 2020-02-03 ENCOUNTER — OFFICE VISIT (OUTPATIENT)
Dept: WOUND CARE | Facility: HOSPITAL | Age: 65
End: 2020-02-03
Attending: NURSE PRACTITIONER
Payer: MEDICAID

## 2020-02-03 DIAGNOSIS — L97.509 DIABETIC FOOT ULCER (HCC): ICD-10-CM

## 2020-02-03 DIAGNOSIS — E13.610 CHARCOT'S JOINT DISEASE DUE TO SECONDARY DIABETES (HCC): Primary | ICD-10-CM

## 2020-02-03 DIAGNOSIS — E11.42 DIABETIC PERIPHERAL NEUROPATHY (HCC): ICD-10-CM

## 2020-02-03 DIAGNOSIS — E11.621 DIABETIC FOOT ULCER (HCC): ICD-10-CM

## 2020-02-03 PROCEDURE — 29581 APPL MULTLAYER CMPRN SYS LEG: CPT

## 2020-02-03 NOTE — PROGRESS NOTES
Subjective    Chief Complaint  This information was obtained from the patient  2/3/2020 \" I plan to go to [facility] in 1-2 wks for the casting. I am negative for C-Diff\".     Allergies  Coffee    HPI  This information was obtained from the patient  PT 7/16/2019 This 62year old male comes in today with a diabetic ulcer on his left heel he has had for > 2 wks. Concerned because he has had prolonged wounds of the foot in the past and he has been a previous [facility] patient.   MD Requesting wound clini Wound #4 Left, Plantar Foot is a James Grade 2 Diabetic Ulcer and has received a status of Not Healed. Subsequent wound encounter measurements are 0.2cm length x 0.2cm width x 0.1cm depth, with an area of 0.04 sq cm and a volume of 0.004 cubic cm.  There i Pt presents with wounds that are slowly resolving with increased firmness noted in the wound bed of the heel. Exudation remains heavy with Aquacel Aq alginated used to assist with  and antimicrobial benefits.  Multi layer compression was applied for Cleansed wound and periwound with non-cytotoxic agent. using Wound Cleanser Spray (1)  Written PT Goals - . Short Term (4-6 weeks)  Reduce necrosis by 75%  Decrease depth by 75%  Written PT Goals - Long Term (8-12 weeks)  Reduce wound area by 100%  Wound cl

## 2020-02-07 ENCOUNTER — APPOINTMENT (OUTPATIENT)
Dept: WOUND CARE | Facility: HOSPITAL | Age: 65
End: 2020-02-07
Attending: NURSE PRACTITIONER
Payer: MEDICAID

## 2020-02-10 ENCOUNTER — APPOINTMENT (OUTPATIENT)
Dept: WOUND CARE | Facility: HOSPITAL | Age: 65
End: 2020-02-10
Attending: NURSE PRACTITIONER
Payer: MEDICAID

## 2020-02-11 ENCOUNTER — OFFICE VISIT (OUTPATIENT)
Dept: WOUND CARE | Facility: HOSPITAL | Age: 65
End: 2020-02-11
Attending: NURSE PRACTITIONER
Payer: MEDICAID

## 2020-02-11 DIAGNOSIS — L97.509 DIABETIC FOOT ULCER (HCC): Primary | ICD-10-CM

## 2020-02-11 DIAGNOSIS — E11.621 DIABETIC FOOT ULCER (HCC): Primary | ICD-10-CM

## 2020-02-11 PROCEDURE — 29581 APPL MULTLAYER CMPRN SYS LEG: CPT

## 2020-02-11 NOTE — PROGRESS NOTES
Subjective    Chief Complaint  This information was obtained from the patient  2/11/2020 \"Sorry I did not come in as scheduled the last couple of times I had car trouble and had to get a tow\".     Allergies  Coffee    HPI  This information was obtained fr 7/16/2019 This 62year old male comes in today with a diabetic ulcer on his left heel he has had for > 2 wks. Concerned because he has had prolonged wounds of the foot in the past and he has been a previous [facility] patient.   MD Requesting wound clini Wound #4 Left, Plantar Foot is a Diabetic Ulcer and has received an outcome of Resolved. Measurements are 0cm length x 0cm width x 0cm depth, with an area of 0 sq cm and a volume of 0 cubic cm.   The periwound skin color is normal. The periwound skin exhibi Pt presents to his appointment after missing his last appointment and he had a strong odor present upon removal of his dressings. Exudation had drained though bandages because of increase time between treatments.  LE and wound area was cleansed with Dakin's Applied offloading shoe wear: using CAM boot (1)  Selective / Non-selective debridement  Cleansed wound and periwound with non-cytotoxic agent. using Wound Cleanser Spray (1)  Written PT Goals - . Short Term (4-6 weeks)  Reduce necrosis by 75%  Decrease dep

## 2020-02-14 ENCOUNTER — OFFICE VISIT (OUTPATIENT)
Dept: WOUND CARE | Facility: HOSPITAL | Age: 65
End: 2020-02-14
Attending: NURSE PRACTITIONER
Payer: MEDICAID

## 2020-02-14 DIAGNOSIS — L97.522 NON-HEALING ULCER OF LEFT FOOT WITH FAT LAYER EXPOSED (HCC): Primary | ICD-10-CM

## 2020-02-14 PROCEDURE — 29581 APPL MULTLAYER CMPRN SYS LEG: CPT

## 2020-02-14 NOTE — PROGRESS NOTES
Subjective    Chief Complaint  This information was obtained from the patient  2/14/2020 \"I feel ok and the leg feels a bitt less tender near the wound than last week, less odor also, I see what you mean about not missing appointments\".     Allergies  Cof 7/16/2019 This 62year old male comes in today with a diabetic ulcer on his left heel he has had for > 2 wks. Concerned because he has had prolonged wounds of the foot in the past and he has been a previous [facility] patient.   MD Requesting wound clini Wound #5 Left, Medial Heel is an acute James Grade 2 Diabetic Ulcer and has received a status of Not Healed. Subsequent wound encounter measurements are 0.4cm length x 0.3cm width x 0.2cm depth, with an area of 0.12 sq cm and a volume of 0.024 cubic cm.  Ivy Douglas Limb cleansed using Betasept and water (1)  Cleansed wound and periwound with non-cytotoxic agent. using Wound Cleanser Spray (1)  Applied antiseptic dressing: using Dakins 1/4 strength gauze (1)  Applied Primary Wound Dressing.  using Aquacel AG Extra 4x5 Applied offloading shoe wear: using CAM boot (1)  Selective / Non-selective debridement  Cleansed wound and periwound with non-cytotoxic agent. using Wound Cleanser Spray (1)  Written PT Goals - . Short Term (4-6 weeks)  Reduce necrosis by 75%  Decrease dep

## 2020-02-18 ENCOUNTER — OFFICE VISIT (OUTPATIENT)
Dept: WOUND CARE | Facility: HOSPITAL | Age: 65
End: 2020-02-18
Attending: NURSE PRACTITIONER
Payer: MEDICAID

## 2020-02-18 DIAGNOSIS — L97.509: Primary | ICD-10-CM

## 2020-02-18 PROCEDURE — 29581 APPL MULTLAYER CMPRN SYS LEG: CPT

## 2020-02-18 NOTE — PROGRESS NOTES
Subjective    Chief Complaint  This information was obtained from the patient  2/18/2020 \"I feel ok just tender around the wound if you press with your fingers, I have a follow up with my surgeon next week trying to get an appointment set up at [facility]". 7/16/2019 This 62year old male comes in today with a diabetic ulcer on his left heel he has had for > 2 wks. Concerned because he has had prolonged wounds of the foot in the past and he has been a previous [facility] patient.   MD Requesting wound clini Wound #5 Left, Medial Heel is an acute James Grade 2 Diabetic Ulcer and has received a status of Not Healed. There is a moderate amount of sero-sanguineous drainage noted. The patient reports a wound pain of level 3/10. The wound margin is well defined.  Terence Hernandez Entered By: Matthew Hunt on 63/35/1160 2:16:35 PM  Treatment Notes Summary  Wound #3a (Left Heel)  . Wound Treatment Note  Limb cleansed using Betasept and water (1)  Cleansed wound and periwound with non-cytotoxic agent.  using Wound Cleanser Spray (1)  A

## 2020-02-21 ENCOUNTER — APPOINTMENT (OUTPATIENT)
Dept: WOUND CARE | Facility: HOSPITAL | Age: 65
End: 2020-02-21
Attending: NURSE PRACTITIONER
Payer: MEDICAID

## 2020-02-21 DIAGNOSIS — E11.621 DIABETIC FOOT ULCERS (HCC): ICD-10-CM

## 2020-02-21 DIAGNOSIS — E13.610 CHARCOT'S JOINT DISEASE DUE TO SECONDARY DIABETES (HCC): Primary | ICD-10-CM

## 2020-02-21 DIAGNOSIS — L97.509 DIABETIC FOOT ULCERS (HCC): ICD-10-CM

## 2020-02-21 PROCEDURE — 29581 APPL MULTLAYER CMPRN SYS LEG: CPT

## 2020-02-21 NOTE — PROGRESS NOTES
Subjective    Chief Complaint  This information was obtained from the patient  2/21/2020 \"I feel ok I am not + for any infections now that all my testing is in but I have still been having diarrhea for several weeks so I will ask my Doctor what I should d wounds of the foot in the past and he has been a previous [facility] patient.   MD Requesting wound clinic referral.  Pt reports he had a stone in his shoe that he did not notice that caused the intial episode blister and wound resulted and the protective 0.3cm length x 0.2cm width x 0.2cm depth, with an area of 0.06 sq cm and a volume of 0.012 cubic cm. There is a moderate amount of sero-sanguineous drainage noted. The patient reports a wound pain of level 3/10. The wound margin is well defined.  Wound bed non-cytotoxic agent. using Vashe (1)  Applied Primary Wound Dressing. using Aquacel AG Extra 4x5 hydrofiber (2)  Dressing secured with non-allergenic tape/stockinet/wrap. using Kerlix (1)  Applied Offloading device.  using 1/4\" thick adhesive felt applied 75%  Decrease depth by 75%  Written PT Goals - Long Term (8-12 weeks)  Reduce wound area by 100%  Wound closure  Patient and/or family to be independent with use of compression garments.   Patient to obtain proper diabetic shoe wear  Achieve wound closure t

## 2020-02-25 ENCOUNTER — OFFICE VISIT (OUTPATIENT)
Dept: WOUND CARE | Facility: HOSPITAL | Age: 65
End: 2020-02-25
Attending: NURSE PRACTITIONER
Payer: MEDICAID

## 2020-02-25 DIAGNOSIS — E08.621 DIABETIC ULCER OF HEEL ASSOCIATED WITH DIABETES MELLITUS DUE TO UNDERLYING CONDITION, WITH FAT LAYER EXPOSED, UNSPECIFIED LATERALITY (HCC): Primary | ICD-10-CM

## 2020-02-25 DIAGNOSIS — L97.402 DIABETIC ULCER OF HEEL ASSOCIATED WITH DIABETES MELLITUS DUE TO UNDERLYING CONDITION, WITH FAT LAYER EXPOSED, UNSPECIFIED LATERALITY (HCC): Primary | ICD-10-CM

## 2020-02-25 PROCEDURE — 29581 APPL MULTLAYER CMPRN SYS LEG: CPT

## 2020-02-25 NOTE — PROGRESS NOTES
Subjective    Chief Complaint  This information was obtained from the patient  2/25/2020 \"I have been dragging my feet on getting the schedule for my cast at [facility] because of my work not wanting me to be in a cast but if you think I need the cast I will 7/16/2019 This 62year old male comes in today with a diabetic ulcer on his left heel he has had for > 2 wks. Concerned because he has had prolonged wounds of the foot in the past and he has been a previous [facility] patient.   MD Requesting wound clini Wound #5 Left, Medial Heel is an acute James Grade 2 Diabetic Ulcer and has received a status of Not Healed. Subsequent wound encounter measurements are 0.2cm length x 0.2cm width x 0.2cm depth, with an area of 0.04 sq cm and a volume of 0.008 cubic cm.  Yamilex Dowling Medications prescribed:  Dakin's Solution - miscellaneous 0.25 % solution for PRN for wound odor was prescribed at facility            Entered By: Erik Bernabe on 76/07/7606 2:48:33 PM  Signature(s): Date(s):  Treatment Notes Summary  Wound #3a (Left Irwin Applied topical product to vickie-wound area avoiding wound base. using Zinc Paste (1)  Applied Primary Wound Dressing.  using Aquacel AG Extra 4x5 hydrofiber (3), Silverlon 10x12 (1)  Dressing secured with non-allergenic tape/stockinet/wrap. using Kerlix (1)

## 2020-02-28 ENCOUNTER — OFFICE VISIT (OUTPATIENT)
Dept: WOUND CARE | Facility: HOSPITAL | Age: 65
End: 2020-02-28
Attending: NURSE PRACTITIONER
Payer: MEDICAID

## 2020-02-28 DIAGNOSIS — E11.621 DIABETIC FOOT ULCERS (HCC): Primary | ICD-10-CM

## 2020-02-28 DIAGNOSIS — L97.509 DIABETIC FOOT ULCERS (HCC): Primary | ICD-10-CM

## 2020-02-28 PROCEDURE — 29581 APPL MULTLAYER CMPRN SYS LEG: CPT

## 2020-02-28 NOTE — PROGRESS NOTES
Subjective    Chief Complaint  This information was obtained from the patient  2/28/2020 \"Good news I set up my next appointment for [facility] so they can help me transition into a contact cast like we talked about and my wounds sensitivity is better so that 7/16/2019 This 62year old male comes in today with a diabetic ulcer on his left heel he has had for > 2 wks. Concerned because he has had prolonged wounds of the foot in the past and he has been a previous [facility] patient.   MD Requesting wound clini Wound #5 Left, Medial Heel is an acute Diabetic Ulcer and has received an outcome of Resolved. Measurements are 0cm length x 0cm width x 0cm depth, with an area of 0 sq cm and a volume of 0 cubic cm.   The periwound skin color is normal. The periwound skin Continue per POC as above to facilitate transition of care to [facility] to allow for total contact casting and follow up care with pt's surgeon.  Pt plans to eventually acquire a custom shoe comparable a CROW boot to protect his Charcot foot from future damage Achieve wound closure to promote return to normal functional activities

## 2020-03-03 ENCOUNTER — APPOINTMENT (OUTPATIENT)
Dept: WOUND CARE | Facility: HOSPITAL | Age: 65
End: 2020-03-03
Attending: NURSE PRACTITIONER
Payer: MEDICAID

## 2020-03-06 ENCOUNTER — APPOINTMENT (OUTPATIENT)
Dept: WOUND CARE | Facility: HOSPITAL | Age: 65
End: 2020-03-06
Attending: NURSE PRACTITIONER
Payer: MEDICAID

## 2020-03-10 ENCOUNTER — APPOINTMENT (OUTPATIENT)
Dept: WOUND CARE | Facility: HOSPITAL | Age: 65
End: 2020-03-10
Attending: NURSE PRACTITIONER
Payer: MEDICAID

## 2020-03-13 ENCOUNTER — APPOINTMENT (OUTPATIENT)
Dept: WOUND CARE | Facility: HOSPITAL | Age: 65
End: 2020-03-13
Attending: NURSE PRACTITIONER
Payer: MEDICAID

## 2020-03-18 ENCOUNTER — APPOINTMENT (OUTPATIENT)
Dept: GENERAL RADIOLOGY | Facility: HOSPITAL | Age: 65
End: 2020-03-18
Attending: PHYSICIAN ASSISTANT
Payer: MEDICAID

## 2020-03-18 ENCOUNTER — HOSPITAL ENCOUNTER (OUTPATIENT)
Facility: HOSPITAL | Age: 65
Setting detail: OBSERVATION
Discharge: HOME | End: 2020-03-19
Attending: EMERGENCY MEDICINE | Admitting: INTERNAL MEDICINE
Payer: MEDICAID

## 2020-03-18 DIAGNOSIS — R07.9 CHEST PAIN OF UNCERTAIN ETIOLOGY: Primary | ICD-10-CM

## 2020-03-18 DIAGNOSIS — R77.8 ELEVATED TROPONIN: ICD-10-CM

## 2020-03-18 PROBLEM — R79.89 ELEVATED TROPONIN: Status: ACTIVE | Noted: 2020-03-18

## 2020-03-18 LAB
ALBUMIN SERPL-MCNC: 3.2 G/DL (ref 3.4–5)
ALBUMIN/GLOB SERPL: 0.6 {RATIO} (ref 1–2)
ALP LIVER SERPL-CCNC: 95 U/L (ref 45–117)
ALT SERPL-CCNC: 39 U/L (ref 16–61)
ANION GAP SERPL CALC-SCNC: 4 MMOL/L (ref 0–18)
AST SERPL-CCNC: 22 U/L (ref 15–37)
ATRIAL RATE: 53 BPM
BASOPHILS # BLD AUTO: 0.07 X10(3) UL (ref 0–0.2)
BASOPHILS NFR BLD AUTO: 0.8 %
BILIRUB SERPL-MCNC: 0.4 MG/DL (ref 0.1–2)
BUN BLD-MCNC: 14 MG/DL (ref 7–18)
BUN/CREAT SERPL: 15.2 (ref 10–20)
CALCIUM BLD-MCNC: 9.1 MG/DL (ref 8.5–10.1)
CHLORIDE SERPL-SCNC: 105 MMOL/L (ref 98–112)
CO2 SERPL-SCNC: 28 MMOL/L (ref 21–32)
CREAT BLD-MCNC: 0.92 MG/DL (ref 0.7–1.3)
D-DIMER: 0.57 UG/ML FEU (ref ?–0.63)
DEPRECATED RDW RBC AUTO: 54.4 FL (ref 35.1–46.3)
EOSINOPHIL # BLD AUTO: 0.31 X10(3) UL (ref 0–0.7)
EOSINOPHIL NFR BLD AUTO: 3.4 %
ERYTHROCYTE [DISTWIDTH] IN BLOOD BY AUTOMATED COUNT: 19.2 % (ref 11–15)
EST. AVERAGE GLUCOSE BLD GHB EST-MCNC: 157 MG/DL (ref 68–126)
GLOBULIN PLAS-MCNC: 5.2 G/DL (ref 2.8–4.4)
GLUCOSE BLD-MCNC: 138 MG/DL (ref 70–99)
GLUCOSE BLD-MCNC: 157 MG/DL (ref 70–99)
GLUCOSE BLD-MCNC: 227 MG/DL (ref 70–99)
HBA1C MFR BLD HPLC: 7.1 % (ref ?–5.7)
HCT VFR BLD AUTO: 39.6 % (ref 39–53)
HGB BLD-MCNC: 12.4 G/DL (ref 13–17.5)
IMM GRANULOCYTES # BLD AUTO: 0.03 X10(3) UL (ref 0–1)
IMM GRANULOCYTES NFR BLD: 0.3 %
LYMPHOCYTES # BLD AUTO: 1.85 X10(3) UL (ref 1–4)
LYMPHOCYTES NFR BLD AUTO: 20.3 %
M PROTEIN MFR SERPL ELPH: 8.4 G/DL (ref 6.4–8.2)
MCH RBC QN AUTO: 24.8 PG (ref 26–34)
MCHC RBC AUTO-ENTMCNC: 31.3 G/DL (ref 31–37)
MCV RBC AUTO: 79.4 FL (ref 80–100)
MONOCYTES # BLD AUTO: 0.73 X10(3) UL (ref 0.1–1)
MONOCYTES NFR BLD AUTO: 8 %
NEUTROPHILS # BLD AUTO: 6.12 X10 (3) UL (ref 1.5–7.7)
NEUTROPHILS # BLD AUTO: 6.12 X10(3) UL (ref 1.5–7.7)
NEUTROPHILS NFR BLD AUTO: 67.2 %
OSMOLALITY SERPL CALC.SUM OF ELEC: 287 MOSM/KG (ref 275–295)
P AXIS: 37 DEGREES
P-R INTERVAL: 206 MS
PLATELET # BLD AUTO: 250 10(3)UL (ref 150–450)
POTASSIUM SERPL-SCNC: 3.8 MMOL/L (ref 3.5–5.1)
Q-T INTERVAL: 444 MS
QRS DURATION: 142 MS
QTC CALCULATION (BEZET): 416 MS
R AXIS: -31 DEGREES
RBC # BLD AUTO: 4.99 X10(6)UL (ref 4.3–5.7)
SODIUM SERPL-SCNC: 137 MMOL/L (ref 136–145)
T AXIS: 91 DEGREES
TROPONIN I SERPL-MCNC: 0.14 NG/ML (ref ?–0.04)
TROPONIN I SERPL-MCNC: 0.19 NG/ML (ref ?–0.04)
VENTRICULAR RATE: 53 BPM
WBC # BLD AUTO: 9.1 X10(3) UL (ref 4–11)

## 2020-03-18 PROCEDURE — 71045 X-RAY EXAM CHEST 1 VIEW: CPT | Performed by: PHYSICIAN ASSISTANT

## 2020-03-18 PROCEDURE — 80053 COMPREHEN METABOLIC PANEL: CPT | Performed by: PHYSICIAN ASSISTANT

## 2020-03-18 PROCEDURE — 99285 EMERGENCY DEPT VISIT HI MDM: CPT

## 2020-03-18 PROCEDURE — 82962 GLUCOSE BLOOD TEST: CPT

## 2020-03-18 PROCEDURE — 93010 ELECTROCARDIOGRAM REPORT: CPT

## 2020-03-18 PROCEDURE — 83036 HEMOGLOBIN GLYCOSYLATED A1C: CPT | Performed by: INTERNAL MEDICINE

## 2020-03-18 PROCEDURE — 85025 COMPLETE CBC W/AUTO DIFF WBC: CPT | Performed by: PHYSICIAN ASSISTANT

## 2020-03-18 PROCEDURE — 36415 COLL VENOUS BLD VENIPUNCTURE: CPT

## 2020-03-18 PROCEDURE — 85379 FIBRIN DEGRADATION QUANT: CPT | Performed by: PHYSICIAN ASSISTANT

## 2020-03-18 PROCEDURE — 84484 ASSAY OF TROPONIN QUANT: CPT | Performed by: INTERNAL MEDICINE

## 2020-03-18 PROCEDURE — 84484 ASSAY OF TROPONIN QUANT: CPT | Performed by: PHYSICIAN ASSISTANT

## 2020-03-18 PROCEDURE — 93005 ELECTROCARDIOGRAM TRACING: CPT

## 2020-03-18 RX ORDER — ASPIRIN 325 MG
325 TABLET ORAL DAILY
Status: DISCONTINUED | OUTPATIENT
Start: 2020-03-18 | End: 2020-03-19

## 2020-03-18 RX ORDER — NITROGLYCERIN 0.4 MG/1
0.4 TABLET SUBLINGUAL EVERY 5 MIN PRN
Status: DISCONTINUED | OUTPATIENT
Start: 2020-03-18 | End: 2020-03-19

## 2020-03-18 RX ORDER — GABAPENTIN 400 MG/1
800 CAPSULE ORAL 3 TIMES DAILY
Status: DISCONTINUED | OUTPATIENT
Start: 2020-03-18 | End: 2020-03-19

## 2020-03-18 RX ORDER — METOCLOPRAMIDE HYDROCHLORIDE 5 MG/ML
10 INJECTION INTRAMUSCULAR; INTRAVENOUS EVERY 8 HOURS PRN
Status: DISCONTINUED | OUTPATIENT
Start: 2020-03-18 | End: 2020-03-19

## 2020-03-18 RX ORDER — ONDANSETRON 2 MG/ML
4 INJECTION INTRAMUSCULAR; INTRAVENOUS EVERY 6 HOURS PRN
Status: DISCONTINUED | OUTPATIENT
Start: 2020-03-18 | End: 2020-03-19

## 2020-03-18 RX ORDER — HYDROCODONE BITARTRATE AND ACETAMINOPHEN 10; 325 MG/1; MG/1
1 TABLET ORAL EVERY 6 HOURS PRN
Status: DISCONTINUED | OUTPATIENT
Start: 2020-03-18 | End: 2020-03-19

## 2020-03-18 RX ORDER — POTASSIUM CHLORIDE 20 MEQ/1
40 TABLET, EXTENDED RELEASE ORAL ONCE
Status: COMPLETED | OUTPATIENT
Start: 2020-03-18 | End: 2020-03-18

## 2020-03-18 RX ORDER — ACETAMINOPHEN 325 MG/1
650 TABLET ORAL EVERY 6 HOURS PRN
Status: DISCONTINUED | OUTPATIENT
Start: 2020-03-18 | End: 2020-03-19

## 2020-03-18 RX ORDER — HEPARIN SODIUM 5000 [USP'U]/ML
5000 INJECTION, SOLUTION INTRAVENOUS; SUBCUTANEOUS EVERY 8 HOURS SCHEDULED
Status: DISCONTINUED | OUTPATIENT
Start: 2020-03-18 | End: 2020-03-19

## 2020-03-18 RX ORDER — DEXTROSE MONOHYDRATE 25 G/50ML
50 INJECTION, SOLUTION INTRAVENOUS
Status: DISCONTINUED | OUTPATIENT
Start: 2020-03-18 | End: 2020-03-19

## 2020-03-18 RX ORDER — ASPIRIN 81 MG/1
324 TABLET, CHEWABLE ORAL ONCE
Status: COMPLETED | OUTPATIENT
Start: 2020-03-18 | End: 2020-03-18

## 2020-03-18 RX ORDER — VALSARTAN 320 MG/1
320 TABLET ORAL DAILY
Status: DISCONTINUED | OUTPATIENT
Start: 2020-03-19 | End: 2020-03-19

## 2020-03-18 RX ORDER — ROSUVASTATIN CALCIUM 20 MG/1
20 TABLET, COATED ORAL NIGHTLY
Status: DISCONTINUED | OUTPATIENT
Start: 2020-03-18 | End: 2020-03-19

## 2020-03-18 NOTE — ED INITIAL ASSESSMENT (HPI)
Patient arrived via [facility]. Patient is A&Ox4, no fever, no N/V. Patient reports feeling of chest discomfort for the past 2 days along with dizziness that comes and goes. Patient is diabetic with a healing wound on the left foot.

## 2020-03-18 NOTE — ED PROVIDER NOTES
Patient Seen in: [facility] Emergency Department      History   Patient presents with:  Chest Pain Angina  Dizziness    Stated Complaint: Chest pain    HPI    CHIEF COMPLAINT: Dizziness with chest pressure    HISTORY OF PRESENT ILLNESS: Patient is a past medical history and social history elements is as listed in today's nurse's notes are reviewed and agree. The patient's family history is reviewed and is noncontributory to the presenting problem, except as indicated as above.     Past Medical History: ED Triage Vitals [03/18/20 1545]   BP (!) 177/70   Pulse 55   Resp 13   Temp 98.7 °F (37.1 °C)   Temp src Temporal   SpO2 100 %   O2 Device None (Room air)       Current:/70   Pulse 51   Temp 98.7 °F (37.1 °C) (Temporal)   Resp 12   Ht 190.5 cm ( order CBC WITH DIFFERENTIAL WITH PLATELET.   Procedure                               Abnormality         Status                     ---------                               -----------         ------                     CBC W/ DIFFERENTIAL[[id number]] Impression:  Chest pain of uncertain etiology  (primary encounter diagnosis)  Elevated troponin    Disposition:  Admit  3/18/2020  4:57 pm    Follow-up:  No follow-up provider specified.       Medications Prescribed:  Current Discharge Medication List

## 2020-03-19 ENCOUNTER — RESEARCH ENCOUNTER (OUTPATIENT)
Dept: CARDIOLOGY | Age: 65
End: 2020-03-19

## 2020-03-19 ENCOUNTER — APPOINTMENT (OUTPATIENT)
Dept: CV DIAGNOSTICS | Facility: HOSPITAL | Age: 65
End: 2020-03-19
Attending: INTERNAL MEDICINE
Payer: MEDICAID

## 2020-03-19 VITALS
HEIGHT: 75 IN | BODY MASS INDEX: 34.76 KG/M2 | WEIGHT: 279.56 LBS | DIASTOLIC BLOOD PRESSURE: 69 MMHG | HEART RATE: 56 BPM | SYSTOLIC BLOOD PRESSURE: 144 MMHG | TEMPERATURE: 98 F | OXYGEN SATURATION: 100 % | RESPIRATION RATE: 18 BRPM

## 2020-03-19 DIAGNOSIS — R07.9 CHEST PAIN, UNSPECIFIED TYPE: Primary | ICD-10-CM

## 2020-03-19 LAB
BASOPHILS # BLD AUTO: 0.08 X10(3) UL (ref 0–0.2)
BASOPHILS NFR BLD AUTO: 1 %
CHOLEST SMN-MCNC: 95 MG/DL (ref ?–200)
DEPRECATED RDW RBC AUTO: 55 FL (ref 35.1–46.3)
EOSINOPHIL # BLD AUTO: 0.46 X10(3) UL (ref 0–0.7)
EOSINOPHIL NFR BLD AUTO: 5.7 %
ERYTHROCYTE [DISTWIDTH] IN BLOOD BY AUTOMATED COUNT: 19.6 % (ref 11–15)
GLUCOSE BLD-MCNC: 108 MG/DL (ref 70–99)
GLUCOSE BLD-MCNC: 168 MG/DL (ref 70–99)
GLUCOSE BLD-MCNC: 195 MG/DL (ref 70–99)
HCT VFR BLD AUTO: 38.6 % (ref 39–53)
HDLC SERPL-MCNC: 36 MG/DL (ref 40–59)
HGB BLD-MCNC: 12 G/DL (ref 13–17.5)
IMM GRANULOCYTES # BLD AUTO: 0.05 X10(3) UL (ref 0–1)
IMM GRANULOCYTES NFR BLD: 0.6 %
LDLC SERPL CALC-MCNC: 39 MG/DL (ref ?–100)
LYMPHOCYTES # BLD AUTO: 2.16 X10(3) UL (ref 1–4)
LYMPHOCYTES NFR BLD AUTO: 27 %
MCH RBC QN AUTO: 24.9 PG (ref 26–34)
MCHC RBC AUTO-ENTMCNC: 31.1 G/DL (ref 31–37)
MCV RBC AUTO: 80.1 FL (ref 80–100)
MONOCYTES # BLD AUTO: 0.69 X10(3) UL (ref 0.1–1)
MONOCYTES NFR BLD AUTO: 8.6 %
NEUTROPHILS # BLD AUTO: 4.57 X10 (3) UL (ref 1.5–7.7)
NEUTROPHILS # BLD AUTO: 4.57 X10(3) UL (ref 1.5–7.7)
NEUTROPHILS NFR BLD AUTO: 57.1 %
NONHDLC SERPL-MCNC: 59 MG/DL (ref ?–130)
PLATELET # BLD AUTO: 246 10(3)UL (ref 150–450)
POTASSIUM SERPL-SCNC: 4 MMOL/L (ref 3.5–5.1)
RBC # BLD AUTO: 4.82 X10(6)UL (ref 4.3–5.7)
TRIGL SERPL-MCNC: 100 MG/DL (ref 30–149)
TROPONIN I SERPL-MCNC: 0.16 NG/ML (ref ?–0.04)
VLDLC SERPL CALC-MCNC: 20 MG/DL (ref 0–30)
WBC # BLD AUTO: 8 X10(3) UL (ref 4–11)

## 2020-03-19 PROCEDURE — 85025 COMPLETE CBC W/AUTO DIFF WBC: CPT | Performed by: INTERNAL MEDICINE

## 2020-03-19 PROCEDURE — 78452 HT MUSCLE IMAGE SPECT MULT: CPT | Performed by: INTERNAL MEDICINE

## 2020-03-19 PROCEDURE — 82962 GLUCOSE BLOOD TEST: CPT

## 2020-03-19 PROCEDURE — 80061 LIPID PANEL: CPT | Performed by: INTERNAL MEDICINE

## 2020-03-19 PROCEDURE — 84484 ASSAY OF TROPONIN QUANT: CPT | Performed by: INTERNAL MEDICINE

## 2020-03-19 PROCEDURE — 93017 CV STRESS TEST TRACING ONLY: CPT | Performed by: INTERNAL MEDICINE

## 2020-03-19 PROCEDURE — 84132 ASSAY OF SERUM POTASSIUM: CPT | Performed by: INTERNAL MEDICINE

## 2020-03-19 PROCEDURE — 93306 TTE W/DOPPLER COMPLETE: CPT | Performed by: INTERNAL MEDICINE

## 2020-03-19 PROCEDURE — 93018 CV STRESS TEST I&R ONLY: CPT | Performed by: INTERNAL MEDICINE

## 2020-03-19 PROCEDURE — 99245 OFF/OP CONSLTJ NEW/EST HI 55: CPT | Performed by: INTERNAL MEDICINE

## 2020-03-19 NOTE — PROGRESS NOTES
Lexiscan nuclear stress test complete. No ST depression noted. Rare PAC. Pt denied cardiac symptoms. Final report pending cardiology review.

## 2020-03-19 NOTE — H&P
[facility] Hospitalist History and Physical      CC: CP    PCP: Matias Staples MD    History of Present Illness: Patient is a 62year old male with PMH sig for DM, HTN, DM here with episode of chest pressure and dizziness yesterday.  He was PCP for same and was inst Subcutaneous Solution Pen-injector, INJECT 40 UNITS INTO THE SKIN AT NIGHT, Disp: 45 mL, Rfl: 0  ADMELOG SOLOSTAR 100 UNIT/ML Subcutaneous Solution Pen-injector, INJECT 10 UNITS THREE TIMES DAILY WITH A MEAL PLUS SLIDING SCALE WITHIN 15 MINUTES BEFORE OR I Insulin Aspart Pen  2-10 Units Subcutaneous TID CC and HS   • Insulin Aspart Pen  10 Units Subcutaneous TID CC   • insulin detemir  40 Units Subcutaneous Daily   • gabapentin  800 mg Oral TID   • Rosuvastatin Calcium  20 mg Oral Nightly   • valsartan  320 03/18/2020    ALKPHO 95 03/18/2020    BILT 0.4 03/18/2020    TP 8.4 03/18/2020    AST 22 03/18/2020    ALT 39 03/18/2020    DDIMER 0.57 03/18/2020    TROP 0.159 03/19/2020    PGLU 168 03/19/2020       Above labs reviewed.     Radiology:    I independently r

## 2020-03-19 NOTE — CONSULTS
[facility]  Inpatient Wound Care Contact Note    Lakehurst Salts Patient Status:  Observation    1955 MRN [id number]   [facility] 2NE-A Attending Markell Hyde, [address] Day # 0 PCP Esa Lane MD     Consult received, patient

## 2020-03-19 NOTE — PLAN OF CARE
Preliminary NST shows no ischemia, EF 52% reviewed with dr Seema Walker    We will sign off at this time per Dr Cesar Adrian of [facility]. Thank you.

## 2020-03-19 NOTE — RESEARCH
Patient was consented and enrolled into the PRECISE research study. MD at bedside reviewed consent with patient, all questions answered. Patient is to receive a nuclear stress test per protocol.

## 2020-03-19 NOTE — CONSULTS
[facility]  Cardiology Consultation    Marnorberto Peres Patient Status:  Observation    1955 MRN [id number]   [facility] 2NE-A Attending Jey Reyna, [address] [facility] [facility] Day # 0 PCP Matias Staples MD     Reason for Consultation:  C hyperlipidemia    • RLS (restless legs syndrome)    • Type II or unspecified type diabetes mellitus without mention of complication, not stated as uncontrolled      Past Surgical History:   Procedure Laterality Date   • HEMORRHOIDECTOMY     • OPEN RX CHOWDARY (NEURONTIN) cap 800 mg, 800 mg, Oral, TID  •  HYDROcodone-acetaminophen (NORCO)  MG per tab 1 tablet, 1 tablet, Oral, Q6H PRN  •  Rosuvastatin Calcium (CRESTOR) tab 20 mg, 20 mg, Oral, Nightly  •  valsartan (DIOVAN) tab 320 mg, 320 mg, Oral, Daily DDIMER 0.57 03/18/2020    TROP 0.159 03/19/2020    PGLU 168 03/19/2020     Recent Labs   Lab 03/18/20  1549 03/18/20  2103 03/19/20  0643   TROP 0.192* 0.137* 0.159*     Results for Margorie Cabot (MRN [id number]) as of 3/19/2020 12:30   Ref.  Range 3/19/20

## 2020-03-19 NOTE — PLAN OF CARE
A/o x4, sinus taras on tele, HR in 50s. 98% o2 on RA. Denies any pain or SOB at this time. Stress test in am. Plan of care discussed with pt, verbalized understanding. Call light within reach.        Problem: PAIN - ADULT  Goal: Verbalizes/displays adequate resources  Description  INTERVENTIONS:  - Identify barriers to discharge w/pt and caregiver  - Include patient/family/discharge partner in discharge planning  - Arrange for needed discharge resources and transportation as appropriate  - Identify discharge

## 2020-03-19 NOTE — PROGRESS NOTES
03/18/20 1931 03/18/20 1933 03/18/20 1935   Vital Signs   Pulse 53 55 65   Heart Rate Source Monitor  --   --    Resp 14  --   --    Respiratory Quality Normal  --   --    BP (!) 171/68 157/63 157/69   BP Location Right arm Right arm Right arm   BP Meth

## 2020-03-20 NOTE — PROGRESS NOTES
NURSING DISCHARGE NOTE  Discharge instructions discussed and sent with patient. All questions answered. IV discontinued. Discharged Home via Wheelchair. Accompanied by Support staff  Belongings Taken by patient/family.

## 2020-04-21 ENCOUNTER — HOSPITAL ENCOUNTER (INPATIENT)
Facility: HOSPITAL | Age: 65
LOS: 2 days | Discharge: NURSING, REHAB OR LONG-TERM CARE | DRG: 064 | End: 2020-04-24
Attending: EMERGENCY MEDICINE | Admitting: INTERNAL MEDICINE
Payer: MEDICAID

## 2020-04-21 ENCOUNTER — APPOINTMENT (OUTPATIENT)
Dept: CT IMAGING | Facility: HOSPITAL | Age: 65
DRG: 064 | End: 2020-04-21
Attending: EMERGENCY MEDICINE
Payer: MEDICAID

## 2020-04-21 ENCOUNTER — APPOINTMENT (OUTPATIENT)
Dept: GENERAL RADIOLOGY | Facility: HOSPITAL | Age: 65
DRG: 064 | End: 2020-04-21
Attending: EMERGENCY MEDICINE
Payer: MEDICAID

## 2020-04-21 DIAGNOSIS — I62.9 INTRACRANIAL HEMORRHAGE (HCC): Primary | ICD-10-CM

## 2020-04-21 PROBLEM — R73.9 HYPERGLYCEMIA: Status: ACTIVE | Noted: 2020-04-21

## 2020-04-21 PROCEDURE — 70450 CT HEAD/BRAIN W/O DYE: CPT | Performed by: EMERGENCY MEDICINE

## 2020-04-21 PROCEDURE — 71045 X-RAY EXAM CHEST 1 VIEW: CPT | Performed by: EMERGENCY MEDICINE

## 2020-04-22 ENCOUNTER — APPOINTMENT (OUTPATIENT)
Dept: ULTRASOUND IMAGING | Facility: HOSPITAL | Age: 65
DRG: 064 | End: 2020-04-22
Attending: HOSPITALIST
Payer: MEDICAID

## 2020-04-22 ENCOUNTER — APPOINTMENT (OUTPATIENT)
Dept: CT IMAGING | Facility: HOSPITAL | Age: 65
DRG: 064 | End: 2020-04-22
Attending: NURSE PRACTITIONER
Payer: MEDICAID

## 2020-04-22 PROCEDURE — 99291 CRITICAL CARE FIRST HOUR: CPT | Performed by: INTERNAL MEDICINE

## 2020-04-22 PROCEDURE — 99291 CRITICAL CARE FIRST HOUR: CPT | Performed by: OTHER

## 2020-04-22 PROCEDURE — 99221 1ST HOSP IP/OBS SF/LOW 40: CPT | Performed by: NEUROLOGICAL SURGERY

## 2020-04-22 PROCEDURE — 70450 CT HEAD/BRAIN W/O DYE: CPT | Performed by: NURSE PRACTITIONER

## 2020-04-22 RX ORDER — LABETALOL HYDROCHLORIDE 5 MG/ML
10 INJECTION, SOLUTION INTRAVENOUS EVERY 10 MIN PRN
Status: DISCONTINUED | OUTPATIENT
Start: 2020-04-22 | End: 2020-04-24

## 2020-04-22 RX ORDER — GABAPENTIN 400 MG/1
800 CAPSULE ORAL 3 TIMES DAILY
Status: DISCONTINUED | OUTPATIENT
Start: 2020-04-22 | End: 2020-04-24

## 2020-04-22 RX ORDER — FAMOTIDINE 20 MG/1
20 TABLET ORAL 2 TIMES DAILY
Status: DISCONTINUED | OUTPATIENT
Start: 2020-04-22 | End: 2020-04-22

## 2020-04-22 RX ORDER — HYDROMORPHONE HYDROCHLORIDE 1 MG/ML
0.5 INJECTION, SOLUTION INTRAMUSCULAR; INTRAVENOUS; SUBCUTANEOUS EVERY 30 MIN PRN
Status: DISCONTINUED | OUTPATIENT
Start: 2020-04-22 | End: 2020-04-22

## 2020-04-22 RX ORDER — MORPHINE SULFATE 4 MG/ML
1 INJECTION, SOLUTION INTRAMUSCULAR; INTRAVENOUS EVERY 2 HOUR PRN
Status: DISCONTINUED | OUTPATIENT
Start: 2020-04-22 | End: 2020-04-24

## 2020-04-22 RX ORDER — DEXTROSE MONOHYDRATE 25 G/50ML
50 INJECTION, SOLUTION INTRAVENOUS
Status: DISCONTINUED | OUTPATIENT
Start: 2020-04-22 | End: 2020-04-24

## 2020-04-22 RX ORDER — ROSUVASTATIN CALCIUM 20 MG/1
20 TABLET, COATED ORAL NIGHTLY
Status: DISCONTINUED | OUTPATIENT
Start: 2020-04-22 | End: 2020-04-24

## 2020-04-22 RX ORDER — ACETAMINOPHEN 325 MG/1
650 TABLET ORAL EVERY 4 HOURS PRN
Status: DISCONTINUED | OUTPATIENT
Start: 2020-04-22 | End: 2020-04-24

## 2020-04-22 RX ORDER — SENNOSIDES 8.6 MG
17.2 TABLET ORAL NIGHTLY
Status: DISCONTINUED | OUTPATIENT
Start: 2020-04-22 | End: 2020-04-24

## 2020-04-22 RX ORDER — SODIUM CHLORIDE 9 MG/ML
INJECTION, SOLUTION INTRAVENOUS CONTINUOUS
Status: DISCONTINUED | OUTPATIENT
Start: 2020-04-22 | End: 2020-04-22

## 2020-04-22 RX ORDER — POLYETHYLENE GLYCOL 3350 17 G/17G
17 POWDER, FOR SOLUTION ORAL DAILY PRN
Status: DISCONTINUED | OUTPATIENT
Start: 2020-04-22 | End: 2020-04-24

## 2020-04-22 RX ORDER — MORPHINE SULFATE 4 MG/ML
2 INJECTION, SOLUTION INTRAMUSCULAR; INTRAVENOUS EVERY 2 HOUR PRN
Status: DISCONTINUED | OUTPATIENT
Start: 2020-04-22 | End: 2020-04-24

## 2020-04-22 RX ORDER — ACETAMINOPHEN 650 MG/1
650 SUPPOSITORY RECTAL EVERY 4 HOURS PRN
Status: DISCONTINUED | OUTPATIENT
Start: 2020-04-22 | End: 2020-04-24

## 2020-04-22 RX ORDER — DOCUSATE SODIUM 100 MG/1
100 CAPSULE, LIQUID FILLED ORAL 2 TIMES DAILY
Status: DISCONTINUED | OUTPATIENT
Start: 2020-04-22 | End: 2020-04-24

## 2020-04-22 RX ORDER — BISACODYL 10 MG
10 SUPPOSITORY, RECTAL RECTAL
Status: DISCONTINUED | OUTPATIENT
Start: 2020-04-22 | End: 2020-04-24

## 2020-04-22 RX ORDER — FAMOTIDINE 10 MG/ML
20 INJECTION, SOLUTION INTRAVENOUS 2 TIMES DAILY
Status: DISCONTINUED | OUTPATIENT
Start: 2020-04-22 | End: 2020-04-22

## 2020-04-22 RX ORDER — LORAZEPAM 2 MG/ML
1 INJECTION INTRAMUSCULAR
Status: DISCONTINUED | OUTPATIENT
Start: 2020-04-22 | End: 2020-04-24

## 2020-04-22 RX ORDER — PHENYLEPHRINE HCL IN 0.9% NACL 50MG/250ML
PLASTIC BAG, INJECTION (ML) INTRAVENOUS CONTINUOUS PRN
Status: DISCONTINUED | OUTPATIENT
Start: 2020-04-22 | End: 2020-04-24

## 2020-04-22 RX ORDER — SODIUM PHOSPHATE, DIBASIC AND SODIUM PHOSPHATE, MONOBASIC 7; 19 G/133ML; G/133ML
1 ENEMA RECTAL ONCE AS NEEDED
Status: DISCONTINUED | OUTPATIENT
Start: 2020-04-22 | End: 2020-04-24

## 2020-04-22 RX ORDER — METOCLOPRAMIDE HYDROCHLORIDE 5 MG/ML
10 INJECTION INTRAMUSCULAR; INTRAVENOUS EVERY 8 HOURS PRN
Status: DISCONTINUED | OUTPATIENT
Start: 2020-04-22 | End: 2020-04-24

## 2020-04-22 RX ORDER — ONDANSETRON 2 MG/ML
4 INJECTION INTRAMUSCULAR; INTRAVENOUS EVERY 4 HOURS PRN
Status: DISCONTINUED | OUTPATIENT
Start: 2020-04-22 | End: 2020-04-22

## 2020-04-22 RX ORDER — ONDANSETRON 2 MG/ML
4 INJECTION INTRAMUSCULAR; INTRAVENOUS EVERY 6 HOURS PRN
Status: DISCONTINUED | OUTPATIENT
Start: 2020-04-22 | End: 2020-04-24

## 2020-04-22 RX ORDER — VALSARTAN 320 MG/1
320 TABLET ORAL DAILY
Status: DISCONTINUED | OUTPATIENT
Start: 2020-04-22 | End: 2020-04-24

## 2020-04-22 NOTE — CONSULTS
[facility]  Electrophysiology Initial Consult Note      Maryam Guy Patient Status:  Inpatient    1955 MRN [id number]   [facility] 6NE-A Attending Maritza Ferris, [address] [facility] Day # 0 PCP MD Rajiv Mirza syndrome)    • Type II or unspecified type diabetes mellitus without mention of complication, not stated as uncontrolled      Past Surgical History:   Procedure Laterality Date   • HEMORRHOIDECTOMY     • OPEN RX PATELLA FX     • OPEN RX PERIARTIC FX/DISLOC infusion SOLN, 100-200 mcg/min, Intravenous, Continuous PRN  •  Senna (SENOKOT) tab 17.2 mg, 17.2 mg, Oral, Nightly  •  docusate sodium (COLACE) cap 100 mg, 100 mg, Oral, BID  •  PEG 3350 (MIRALAX) powder packet 17 g, 17 g, Oral, Daily PRN  •  magnesium hy s2  Lungs: CTAB, no DTP  Abdomen: Soft, NT, ND, BS+  Extremities: Warm, well perfused, normal cap refill, no edema  Skin: Warm and dry.        Labs:     Recent Labs   Lab 04/21/20  2317 04/22/20  0442   * 129*   BUN 18 16   CREATSERUM 1.01 0.88   GFR hyperlipidemia     Hypokalemia     Diabetic peripheral neuropathy (HCC)     Essential hypertension     Non-healing ulcer of foot (HCC)     Hyponatremia     Open wound of left ankle     Open wound of heel, left, initial encounter     Diabetic foot ulcer (HC

## 2020-04-22 NOTE — CM/SW NOTE
PT/OT recommending acute rehab.   Referral sent for PMR eval.  SW to f/u with d/c planning pending PMR eval.

## 2020-04-22 NOTE — SLP NOTE
ADULT SWALLOWING EVALUATION    ASSESSMENT    ASSESSMENT/OVERALL IMPRESSION:  Patient seen for swallowing assessment per stroke protocol. Patient admitted due to left arm weakness. He was found to have a right basal ganglia/insular bleed.   Patient alert a disease due to secondary diabetes    • Chronic osteomyelitis of left tibia with draining sinus ([address] 3/5/2018   • Essential hypertension 8/29/2011   • Exposed orthopaedic hardware ([facility] [address] 2/19/2018    Lt foot after Charcot joint surgery revision.   Hardware r Clear  Respiratory Status: Unlabored  Consistencies Trialed: Thin liquids; Hard solid  Method of Presentation: Self presentation;Cup;Straw;Single sips; Consecutive swallows  Patient Positioning: Upright;Midline(in bed)    Oral Phase of Swallow:  Within [facility]

## 2020-04-22 NOTE — H&P
[facility] Hospitalist H&P       CC: Patient presents with:  Stroke       PCP: Yordan Farias MD    History of Present Illness:  Patient is a 60year old male with PMH sig for DMII, HTN, charcot foot, chronic L foot ulcers presenting to ED for evaluation of L ivy Oral Tab, TAKE 1 TABLET(20 MG) BY MOUTH EVERY NIGHT, Disp: 90 tablet, Rfl: 1  GABAPENTIN 800 MG Oral Tab, TAKE 1 TABLET(800 MG) BY MOUTH THREE TIMES DAILY, Disp: 270 tablet, Rfl: 0  BASAGLAR KWIKPEN 100 UNIT/ML Subcutaneous Solution Pen-injector, INJECT 40 for what's stated above.        OBJECTIVE:  BP (!) 168/72   Pulse 67   Temp 97.8 °F (36.6 °C) (Temporal)   Resp 16   Ht 6' 3\" (1.905 m)   Wt 279 lb 5.2 oz (126.7 kg)   SpO2 93%   BMI 34.91 kg/m²   PE:  Gen: alert and oriented, NAD  HEENT: normocephalic, MM noted.  Paranasal sinus, mastoid air cells and orbits are unremarkable. No extra-axial fluid collection. CONCLUSION:  Stable hematoma centered over the right basal ganglia and insular region.    Dictated by: Shell Napier MD on 4/22/2020 at 6:40 (CPT=71045)  TECHNIQUE:  AP chest radiograph was obtained.   COMPARISON:  [facility] , XR, XR CHEST AP PORTABLE  (CPT=71045), 3/18/2020, 3:54 PM.  INDICATIONS:  left arm weakness  PATIENT STATED HISTORY: (As transcribed by Technologist)  Pt. with left sided wea

## 2020-04-22 NOTE — CONSULTS
[facility]  Neurosurgery Consult    Dionne Mejias Patient Status:  Inpatient    1955 MRN [id number]   [facility] 6NE-A Attending Lus Bloch, [address] [facility] Day # 0 PCP Yordan Farias MD     REASON FOR CONSULTATION:  R basal ganglia Staphylococcus aureus infection 3/13/2012   • Obesity, unspecified    • Osteoarthrosis, unspecified whether generalized or localized, unspecified site    • Osteomyelitis of ankle or foot, acute ([facility] [address] 2/2012    post-surgical,  at [facility]   • Osteomyelitis of MG) BY MOUTH TWICE DAILY BEFORE MEALS, Disp: 180 tablet, Rfl: 0, 4/21/2020 at Unknown time  VALSARTAN 320 MG Oral Tab, TAKE 1 TABLET(320 MG) BY MOUTH DAILY, Disp: 90 tablet, Rfl: 1, 4/21/2020 at Unknown time  ASPIRIN 325 MG OR TABS, Take 325 mg by mouth da [facility]) injection 4 mg, 4 mg, Intravenous, Q6H PRN    Or  Metoclopramide HCl (REGLAN) injection 10 mg, 10 mg, Intravenous, Q8H PRN  famoTIDine (PEPCID) tab 20 mg, 20 mg, Oral, BID    Or  famoTIDine (PEPCID) injection 20 mg, 20 mg, Intravenous, BID  [place] [facility] IMAGING:  CT head 4/21/20:  FINDINGS:  There is and acute parenchymal hematoma centered in the right basal ganglia measuring up to 3.1 x 2.4 x 3.5 cm with mild surrounding vasogenic edema. This extends into the right corona radiata as well.      Mini concerns  Would recommend holding anticoagulation for least 5 days

## 2020-04-22 NOTE — OCCUPATIONAL THERAPY NOTE
OCCUPATIONAL THERAPY EVALUATION - INPATIENT     Room Number: 3397/3877-R  Evaluation Date: 4/22/2020  Type of Evaluation: Initial  Presenting Problem: ([address] right basal ganglia)    Physician Order: IP Consult to Occupational Therapy  Reason for Therapy: ADL unspecified site    • Osteomyelitis of ankle or foot, acute ([facility] [address] 2/2012    post-surgical,  at [facility]   • Osteomyelitis of ankle or foot, acute, left ([facility] [address] 8/1/2017   • OSTEOPENIA    • Other and unspecified hyperlipidemia    • RLS (restless legs syndrome) range of visual field    PERCEPTION  appears WFL in clock draw test    SENSATION  Light touch:  intact    Communication: wfl    Behavioral/Emotional/Social: wfl    RANGE OF MOTION AND STRENGTH ASSESSMENT  Upper extremity ROM is within functional limits with SBA  Patient noted to have slow L sided drift in sitting and decreased awareness.    Patient donned R shoe with SBA to slip on with forward lean,: he attempted tying laces but in order to aqvoid exertion in forward lean for prolonged time assistance wa attention to L side, decreased LUE strength and coordination, decreased knowledge of adaptive equipment and techniques.  These deficits impact the patient’s ability to participate in ADLs, instrumental activities of daily living, rest and sleep, work, Consolidated Sam education;Continued evaluation  Rehab Potential : Good  Frequency (Obs): Daily  Number of Visits to Meet Established Goals: 5    ADL Goals   Patient will perform grooming: with supervision and while standing at sink  Patient will perform upper body dressin

## 2020-04-22 NOTE — PLAN OF CARE
Assumed patient care this am. Patient alert, oriented x4. Left arm drift and slight left sided weakness noted. Tylenol given for headache as ordered. Wound care RN at bedside today to assess, treat, and redress left foot wound.  Ambulated in [place] with ADRIANA MANN [facility]

## 2020-04-22 NOTE — CONSULTS
NEUROCRITICAL CARE CONSULTATION NOTE    Wyvonnia Days Patient Status:  Inpatient    1955 MRN [id number]   [facility] 6NE-A Attending Vivien Ochoa, [address] [facility] Day # 0 PCP Michael Silveira MD     Chief Complaint/Reason for Admission:  L issues  - Aspiration precuations  - nebs prn    ID:  Recent left heel ulcer  Tmax: 99.3 F  WBC:   Recent Labs   Lab 04/21/20  2317 04/22/20  0442   WBC 10.3 10.5   Antibiotics: not indicated at present    Heme/Onc:  No active issues  - Monitor Hb/Hct  - Mo 3/13/2012   • Obesity, unspecified    • Osteoarthrosis, unspecified whether generalized or localized, unspecified site    • Osteomyelitis of ankle or foot, acute ([facility] [address] 2/2012    post-surgical,  at [facility]   • Osteomyelitis of ankle or foot, acute, left ([facility] [address] ROSUVASTATIN CALCIUM 20 MG Oral Tab, TAKE 1 TABLET(20 MG) BY MOUTH EVERY NIGHT, Disp: 90 tablet, Rfl: 1  GABAPENTIN 800 MG Oral Tab, TAKE 1 TABLET(800 MG) BY MOUTH THREE TIMES DAILY, Disp: 270 tablet, Rfl: 0  BASAGLAR KWIKPEN 100 UNIT/ML Subcutaneous Sol Discoloration of distal LE. Left heel ulcer. Extremities: Pedal Edema of left LE >> RLE. Wasting of bilateral ulnar innervated muscles. Pulses: Intact  Neurological Examination  Higher Functions: Awake, alert, oriented x 3.  Fluent speech, with intact c above.      Critical Care Time: 65 minutes, excluding billable procedures.  The high probability of sudden, clinically significant, or life-threatening deterioration required my full and direct attention, intervention and personal management while the patie

## 2020-04-22 NOTE — CONSULTS
[facility]  Report of Inpatient Wound Care Consultation     [facility] Patient Status:  Inpatient    1955 MRN [id number]   [facility] 6NE-A Attending Bull Herrmann, [address] [facility] Day # 0 PCP Rc Alcaraz MD     REASON FOR CONS FX     • OPEN RX PERIARTIC FX/DISLOC ELBOW     • OTHER SURGICAL HISTORY  2011, 2012    bilateral foot surgery for Charcot joint   • TONSILLECTOMY  16 y/o     Social History    Tobacco Use      Smoking status: Never Smoker      Smokeless tobacco: Never Used you for this consultation and for allowing me to participate in the care of your patient. Please call me at [phone] or page me at [phone] if you have any questions about this consultation and plan of care.   If unable to reach me at these, please call the [phone] [employer]

## 2020-04-22 NOTE — PROGRESS NOTES
[facility]  Neurocritical Care APRN Progress Note    NAME: Yun Puga - ROOM: [facility]/5731-T - MRN: [id number] - Age: 60year old - :1955    History Of Present Illness:  Yun Puga is a 60year old male with PMHx significant as uncontrolled        Social Hx:  Social History    Tobacco Use      Smoking status: Never Smoker      Smokeless tobacco: Never Used    Alcohol use: No    Drug use: No      Family History:  Family History   Problem Relation Age of Onset   • Heart Disease BALDEMAR STERN @ 75/hr    Proph:  -No a/c at this time d/t evidence of bleeding  -SCD  -Pepcid    Dispo:   -Full code    Plan of care discussed with Wyvonna Schwab, Dr. Agus Epps.     Chiqui EVERETT  Critical Care Nurse Practitioner  [address]

## 2020-04-22 NOTE — PLAN OF CARE
Problem: CARDIOVASCULAR - ADULT  Goal: Maintains optimal cardiac output and hemodynamic stability  Description  INTERVENTIONS:  - Monitor vital signs, rhythm, and trends  - Monitor for bleeding, hypotension and signs of decreased cardiac output  - Evalua and sodium.  Initiate appropriate interventions as ordered  Outcome: Progressing  Goal: Absence of seizures  Description  INTERVENTIONS  - Monitor for seizure activity  - Administer anti-seizure medications as ordered  - Monitor neurological status  Outcome

## 2020-04-22 NOTE — ED PROVIDER NOTES
Patient Seen in: [facility] Emergency Department      History   Patient presents with:  Stroke    Stated Complaint:     HPI    62-year-old male who presents here to the emergency department complaining of having left arm weakness for the past 7 hour • OTHER SURGICAL HISTORY  2011, 2012    bilateral foot surgery for Charcot joint   • TONSILLECTOMY  16 y/o                    Social History    Tobacco Use      Smoking status: Never Smoker      Smokeless tobacco: Never Used    Alcohol use: No    Drug us COMP METABOLIC PANEL (14) - Abnormal; Notable for the following components:       Result Value    Glucose 111 (*)     Albumin 3.3 (*)     Globulin  4.7 (*)     A/G Ratio 0.7 (*)     All other components within normal limits   POCT GLUCOSE - Abnormal; Not the right basal ganglia measuring up to 3.1 x 2.4 x 3.5 cm with mild surrounding vasogenic edema. This extends into the right corona radiata as well. Minimal age-indeterminate microvascular ischemic changes in the cerebral white matter.      There is n aspirin earlier today. His NIH was 2. He was sent for a plain CT scan to assess for an acute injury to the brain including bleed, or mass.   Patient's CAT scan showed an acute parenchymal hematoma centered in the right basal ganglia measuring 3.1 x 2.4 x

## 2020-04-22 NOTE — PHYSICAL THERAPY NOTE
PHYSICAL THERAPY EVALUATION - INPATIENT     Room Number: 9509/8698-R  Evaluation Date: 4/22/2020  Type of Evaluation: Initial  Physician Order: PT Eval and Treat    Presenting Problem: Intracranial hemorrhage  Reason for Therapy: Mobility Dysfunction and hyperlipidemia    • RLS (restless legs syndrome)    • Type II or unspecified type diabetes mellitus without mention of complication, not stated as uncontrolled        Past Surgical History  Past Surgical History:   Procedure Laterality Date   • Elyssa Semen Flexion: WFL - Within Functional Limits   R Wrist Extension: WFL - Within Functional Limits    RUE PROM     RUE Strength  R Shoulder Flexion: 5/5   R Elbow Flexion: 5/5   R Elbow Extension: 5/5   R Hand : 5/5    LUE ASSESSMENT   LUE AROM  L Shoulder Fl bed (including adjusting bedclothes, sheets and blankets)?: A Little   -   Sitting down on and standing up from a chair with arms (e.g., wheelchair, bedside commode, etc.): A Little   -   Moving from lying on back to sitting on the side of the bed?: A Silvano obesity, DMII, LE neuropathy.   In this PT evaluation, the patient presents with the following impairments: 1) impaired L B U/LE strength, 2) impaired left sided coordination, 3) L sided inattention, 4) impaired dynamic sitting and standing balance, and 5) 4/22/2020

## 2020-04-23 PROBLEM — I61.9 HEMORRHAGIC CEREBROVASCULAR ACCIDENT (CVA) (HCC): Status: ACTIVE | Noted: 2020-04-23

## 2020-04-23 PROCEDURE — 99233 SBSQ HOSP IP/OBS HIGH 50: CPT | Performed by: INTERNAL MEDICINE

## 2020-04-23 PROCEDURE — 99232 SBSQ HOSP IP/OBS MODERATE 35: CPT | Performed by: OTHER

## 2020-04-23 NOTE — PLAN OF CARE
Assumed care of Wendy Boston @ approximately 9332: awake, alert, oriented, pleasant, and cooperative with care, Q4H neuro checks deficits as charted in Epic; on room air; NSR on the monitor; CTU orders, reports called prior to start of this shift.     1954 spoke

## 2020-04-23 NOTE — PLAN OF CARE
PT A/O, NEURO Q 4 HOURS, LT ARM DRIFT, SLIGHT LT FACIAL DROOP, LT SIDE WEAKER, 95% ON RA, SB, FREQUENT PACS, REFUSES SCDS, DRESSING TO LT FOOT D/I, LE EDEMA/DRY, DECREASES SENSATION, HOB UP 30 DEGREES, VOIDING, PT REFUSED VENOUS DOPPLER, NOTIFIED DR. Messer Arn

## 2020-04-23 NOTE — PHYSICAL THERAPY NOTE
PHYSICAL THERAPY TREATMENT NOTE - INPATIENT    Room Number: 2284/0372-S     Session: 1   Number of Visits to Meet Established Goals: 5    Presenting Problem: Intracranial hemorrhage     History related to current admission: Pt is 60year old male admitted at [facility]   • Osteomyelitis of ankle or foot, acute, left ([facility] [address] 8/1/2017   • OSTEOPENIA    • Other and unspecified hyperlipidemia    • RLS (restless legs syndrome)    • Type II or unspecified type diabetes mellitus without mention of complication, not sta 50.57%   Standardized Score (AM-PAC Scale): 42.13   CMS Modifier (G-Code): CK    FUNCTIONAL ABILITY STATUS  Gait Assessment   Gait Assistance: Minimum assistance  Distance (ft): 50  Assistive Device: None  Pattern: (janice flat feet, dec hip stability. )  Sto

## 2020-04-23 NOTE — PROGRESS NOTES
[facility] Hospitalist Progress Note                                                                   [address] [facility]  4/9/1955    CC: FU stroke    Interval History:  - Feels well today- strength i Labs   Lab 04/21/20  2317 04/22/20  0442 04/23/20  0505   * 129* 152*   BUN 18 16 18   CREATSERUM 1.01 0.88 0.86   GFRAA 90 105 106   GFRNAA 78 91 92   CA 9.1 8.6 8.7   ALB 3.3*  --   --     135* 137   K 4.1 3.8 4.1    103 104   CO2 30.0

## 2020-04-23 NOTE — CONSULTS
.[address] [facility] Patient Status:  Inpatient    1955 MRN [id number]   [facility] Deanna Curiel, [address] [facility] Day # 1 PCP Fernando Adams MD     Patient Identification  Seema Graff is a 60year old male. bladder incontinence. He is tolerating therapy quite well physiatry consult obtained now to assess pt's funtional status and make appropriate recommendations. He typically works as a  and lives with his sister who works full-time.     Premorbi foot surgery for Charcot joint   • TONSILLECTOMY  16 y/o       insulin detemir (LEVEMIR) 100 UNIT/ML flextouch 30 Units, 30 Units, Subcutaneous, Nightly  glucose (DEX4) oral liquid 15 g, 15 g, Oral, Q15 Min PRN    Or  Glucose-Vitamin C (DEX-4) chewable tab Oral, Nightly  valsartan (DIOVAN) tab 320 mg, 320 mg, Oral, Daily  Insulin Aspart Pen (NOVOLOG) 100 UNIT/ML flexpen 5 Units, 5 Units, Subcutaneous, TID CC        Social History    Tobacco Use      Smoking status: Never Smoker      Smokeless tobacco: Never murmurs appreciated. Radial and pedal pulses good. No cyanosis in all extremities. Abdomen:   No tenderness guarding or rigidity. Liver and spleen are not enlarged. Bowel sounds present.                    Musculoskeletal:     Right Upper Extremity:  Str management, skin management. Physiatric medical oversight to monitor kidney function, cardiac function, pulmonary status, neurologic status, DVT prevention.                  Estimated length of stay in recommended rehabilitation level of care is two wee

## 2020-04-23 NOTE — PLAN OF CARE
Assumed care @0730    VSS  A&Ox4, Neuro's q4 with L arm weakness, no drift. RA    SB, systolic blood pressure has been below 160. LE edema. L foot red/swollen  1-2/10 headache pain, tylenol given with relief. Up with 1 assist and walker.   Toleratin Initiate isolation precautions as appropriate  - Initiate Pressure Ulcer prevention bundle as indicated  Outcome: Progressing     Problem: NEUROLOGICAL - ADULT  Goal: Achieves stable or improved neurological status  Description  INTERVENTIONS  - Assess for and bathing  - Educate and encourage patient/family in tolerated functional activity level and precautions during self-care   -Encourage attention to L side   Outcome: Progressing

## 2020-04-23 NOTE — PROGRESS NOTES
[facility]  Progress Note    Manuel Gan Patient Status:  Inpatient    1955 MRN [id number]   [facility] [place] 7NE-A Attending Meagan Singletary, [address] [facility] Day # 1 PCP Bandar Arora MD     Subjective:  Feels strength and coordination mellitus with foot ulcer, with long-term current use of insulin (HCC)    Elevated troponin    Intracranial hemorrhage (HCC)      Acute right ICH/hemorrhagic CVA. Per neuro/neurosurgery. HTN. DM with mixed hyperlipidemia.   Borderline + troponin C/W acute

## 2020-04-23 NOTE — PROGRESS NOTES
[facility] [facility] Progress Note    Shama Tinoco Patient Status:  Inpatient    1955 MRN [id number]   [facility] 7NE-A Attending Mera Kong, [address] [facility] Day # 1 PCP Aubrie Lagunas MD       Subjective:  Shama Tinoco magnesium hydroxide (MILK OF MAGNESIA) 400 MG/5ML suspension 30 mL, 30 mL, Oral, Daily PRN  bisacodyl (DULCOLAX) rectal suppository 10 mg, 10 mg, Rectal, Daily PRN  Fleet Enema (FLEET) 7-19 GM/118ML enema 133 mL, 1 enema, Rectal, Once PRN  ondansetron HCl CONCLUSION:    1. Acute parenchymal hematoma centered in the right basal ganglia as above. 2. Minimal age-indeterminate microvascular ischemic changes in the cerebral white matter.          Assessment:   R ICH, likely hypertensive etiology, ICH score 0  Va

## 2020-04-23 NOTE — PAYOR COMM NOTE
--------------  ADMISSION REVIEW     Payor: Lane Belle #:  [id number]  Authorization Number: [id number]    Admit date: 4/22/20  Admit time: Lou       Admitting Physician: Chio Whiteside DO  Attending Physician reactive to light. Extra ocular motions are intact. No scleral icterus or conjunctival pallor: Neck is supple without tenderness on palpation. Head is atraumatic normocephalic. Oral mucosa moist.  Tongue is midline. No posterior pharyngeal lesions.   aSnty Benson RAINBOW DRAW LIGHT GREEN   RAINBOW DRAW GOLD     EKG    Rate, intervals and axes as noted on EKG Report. Rate: 77  Rhythm: Sinus Rhythm  Reading: Normal sinus rhythm first-degree AV block nonspecific intraventricular block.   Appears similar to previous Priority   Chloe Parekh MD 4/21/2020       Narrative & Impression     PROCEDURE:  XR CHEST AP PORTABLE  (CPT=71045)     TECHNIQUE:  AP chest radiograph was obtained.      COMPARISON:  [facility] , XR, XR CHEST AP PORTABLE  (CPT=71045), 3/18/2020, 3:5 overt failure. Glucose of 111. The rest of the metabolic panel was essentially normal.  Patient was admitted to the CICU for further care.       Disposition and Plan     Clinical Impression:  Intracranial hemorrhage ([address]  (primary encounter diagnosis) unspecified site    • Osteomyelitis of ankle or foot, acute ([facility] [address] 2/2012    post-surgical,  at [facility]   • Osteomyelitis of ankle or foot, acute, left ([facility] [address] 8/1/2017   • OSTEOPENIA    • Other and unspecified hyperlipidemia    • RLS (restless legs syndrome) NiCARdipine     • phenylephrine       PRN Medication:glucose **OR** Glucose-Vitamin C **OR** dextrose **OR** glucose **OR** Glucose-Vitamin C, acetaminophen **OR** acetaminophen, morphINE sulfate **OR** morphINE sulfate, Labetalol HCl, NiCARdipine, phenyle Registry) which includes the Dose Index Registry. PATIENT STATED HISTORY: (As transcribed by Technologist)  CCU pt, eval bleed    FINDINGS:   3.4 x 2.3 x 3.2 cm right basal ganglia/insular hematoma is morphologically unchanged.   Associated mild mass effec white matter. Critical results were discussed with Dr. Gene Dwyer at [phone] hours on 4/21/2020. Critical results were read back.   Dictated by: Lorna Marvin MD on 4/21/2020 at 11:33 PM     Finalized by: Lorna Marvin MD on 4/21/2020 at 11:36 PM          Xr Hospitalist  Pager: [phone]                    Electronically signed by Dunia Flower MD on 4/22/2020  4:48 PM         MEDICATIONS ADMINISTERED IN LAST 1 DAY:  acetaminophen (TYLENOL) tab 650 mg     Date Action Dose Route User    4/22/2020 7945 Given

## 2020-04-23 NOTE — OCCUPATIONAL THERAPY NOTE
OCCUPATIONAL THERAPY TREATMENT NOTE - INPATIENT     Room Number: 3654/2143-B  Session: 1   Number of Visits to Meet Established Goals: 5    Presenting Problem: ([address] right basal ganglia)    History related to current admission: Admitted 4/21 via ER from chichi infection 3/13/2012   • Obesity, unspecified    • Osteoarthrosis, unspecified whether generalized or localized, unspecified site    • [facility] 2/2012    post-surgical,  at [place]   • Osteomyelitis of ankle or foot, acute, TRANSFER ASSESSMENT  Supine to Sit : Not tested  Sit to Stand: Not tested      Skilled Therapy Provided:   PPE: Surgical mask and gloves worn. Pt seen seated. Engaged in [address], dynamometer, and 9hpt. Results as below.    Pt able to open package effectively hand compared with dominant R hand. Acute rehab is recommended due to significant decline from prior level of function, high level of motivation, ability to benefit from intensive therapy, and potential to benefit from daily physiatry.        OT Disch

## 2020-04-23 NOTE — SLP NOTE
SPEECH/LANGUAGE/COGNITIVE EVALUATION & DYSPHAGIA FOLLOW UP- INPATIENT    Admission Date: 4/21/2020  Evaluation Date: 04/23/20    Reason for Referral: Stroke protocol    ASSESSMENT & PLAN   ASSESSMENT & IMPRESSION  Patient seen for communication assessment CONCLUSION:  Stable hematoma centered over the right basal ganglia and insular region.         Dictated by: Laurent Peraza MD on 4/22/2020 at 6:40 AM       Finalized by: Laurent Peraza MD on 4/22/2020 at 6:42 AM       Patient/Family Goals

## 2020-04-24 VITALS
BODY MASS INDEX: 34.42 KG/M2 | TEMPERATURE: 99 F | HEART RATE: 64 BPM | DIASTOLIC BLOOD PRESSURE: 53 MMHG | RESPIRATION RATE: 13 BRPM | WEIGHT: 276.81 LBS | OXYGEN SATURATION: 98 % | HEIGHT: 75 IN | SYSTOLIC BLOOD PRESSURE: 129 MMHG

## 2020-04-24 RX ORDER — AMMONIUM LACTATE 12 G/100G
LOTION TOPICAL AS NEEDED
Status: DISCONTINUED | OUTPATIENT
Start: 2020-04-24 | End: 2020-04-24

## 2020-04-24 RX ORDER — PSEUDOEPHEDRINE HCL 30 MG
100 TABLET ORAL 2 TIMES DAILY
Qty: 60 CAPSULE | Refills: 0 | Status: SHIPPED | OUTPATIENT
Start: 2020-04-24 | End: 2020-05-08

## 2020-04-24 NOTE — PLAN OF CARE
Assumed patient care at 0700   Patient alert & oriented  Slight left facial droop  Q4 neuros  Bilateral leg decreased sensation; baseline neuropathy  Some ULE fine motor skill difficulty; reports that this is improving   Seizure precautions; bed rails padd RN  Outcome: Progressing  4/24/2020 1601 by Dawson Swain RN  Outcome: Progressing  Goal: Achieves maximal functionality and self care  Description  INTERVENTIONS  - Monitor swallowing and airway patency with patient fatigue and changes in neurological

## 2020-04-24 NOTE — PROGRESS NOTES
[facility] Hospitalist Progress Note                                                                   81 [facility]  4/9/1955    CC: FU stroke    Interval History:  - Feels well today- strength i 223.0 205.0       Recent Labs   Lab 04/21/20  2317 04/22/20  0442 04/23/20  0505 04/24/20  0506   * 129* 152* 161*   BUN 18 16 18 18   CREATSERUM 1.01 0.88 0.86 0.93   GFRAA 90 105 106 100   GFRNAA 78 91 92 86   CA 9.1 8.6 8.7 8.9   ALB 3.3*  --   -

## 2020-04-24 NOTE — PHYSICAL THERAPY NOTE
PHYSICAL THERAPY TREATMENT NOTE - INPATIENT    Room Number: 8802/1760-H     Session: 2  Number of Visits to Meet Established Goals: 5    Presenting Problem: Intracranial hemorrhage     History related to current admission: Pt is 60year old male admitted at [facility]   • Osteomyelitis of ankle or foot, acute, left ([facility] [address] 8/1/2017   • OSTEOPENIA    • Other and unspecified hyperlipidemia    • RLS (restless legs syndrome)    • Type II or unspecified type diabetes mellitus without mention of complication, not stat Standardized Score (AM-PAC Scale): 42.13   CMS Modifier (G-Code): CK    FUNCTIONAL ABILITY STATUS  Gait Assessment   Gait Assistance: Minimum assistance  Distance (ft): 150  Assistive Device: None  Pattern: (janice flat feet, right knee flex, leg length dis Patient is able to demonstrate supine - sit EOB @ level: supervision      Goal #2 Patient is able to demonstrate transfers Sit to/from Stand at assistance level: supervision      Goal #3 Patient is able to ambulate 150 feet with assist device: walker - rol

## 2020-04-24 NOTE — OCCUPATIONAL THERAPY NOTE
OCCUPATIONAL THERAPY TREATMENT NOTE - INPATIENT     Room Number: 0121/5918-L  Session: 2   Number of Visits to Meet Established Goals: 5    Presenting Problem: ([address] right basal ganglia)    History related to current admission: Admitted 4/21 via ER from chichi infection 3/13/2012   • Obesity, unspecified    • Osteoarthrosis, unspecified whether generalized or localized, unspecified site    • Osteomyelitis of ankle or foot, acute ([facility] [address] 2/2012    post-surgical,  at [facility]   • Osteomyelitis of ankle or foot, acute, (G-Code): CK    FUNCTIONAL TRANSFER ASSESSMENT  Supine to Sit : Not tested  Sit to Stand: Supervision      Skilled Therapy Provided:   PPE: Surgical mask and gloves worn. Pt seen seated EOB. [facility] in [facility]  Completes with setup, increased time fo (Obs): Daily      OT Goals:   ADL Goals -ongoing  Patient will perform grooming: with supervision and while standing at sink-met 4/24  Patient will perform upper body dressing:  with supervision  Patient will perform lower body dressing:  with supervision

## 2020-04-24 NOTE — PAYOR COMM NOTE
--------------  CONTINUED STAY REVIEW    Payor: Lane Mckenzie #:  [id number]  Authorization Number: [id number]    Admit date: 4/22/20  Admit time: Lou    Admitting Physician: Rodrick Ramirez DO  Attending Nakul Rodgers 04/21/20 2317 04/22/20  0442   RBC 5.01 4.87   HGB 13.1 12.7*   HCT 40.8 39.3   MCV 81.4 80.7   MCH 26.1 26.1   MCHC 32.1 32.3   RDW 18.0* 18.1*   NEPRELIM 7.10  --    WBC 10.3 10.5   .0 205. 0                Recent Labs   Lab 04/21/20 2317 04/22/2 MEDICATIONS ADMINISTERED IN LAST 1 DAY:  acetaminophen (TYLENOL) tab 650 mg     Date Action Dose Route User    4/23/2020 2105 Given 650 mg Oral Preston Alanis RN      docusate sodium (COLACE) cap 100 mg     Date Action Dose Route User    4/24/20

## 2020-04-24 NOTE — PAYOR COMM NOTE
--------------  CONTINUED STAY REVIEW    Payor: Lane Mckenzie #:  [id number]  Authorization Number: [id number]    Admit date: 4/22/20  Admit time: Lou    Admitting Physician: Cherrie Harrington DO  Attending Leroy Reyes room air. C/o mild HA, PRN tylenol with relief. Dressing intact to left foot. Awaiting ins auth to [employer]. Will cont to monitor.             Electronically signed by Gerardo Weldon RN at 4/24/2020  5:33 AM

## 2020-04-24 NOTE — CM/SW NOTE
Trip Daniel received for pt to go to [address] #[phone] for dates 4/24-4/30. Rosa Whitten from [place] [address] said pt will go into Room 1171 Bed A.  RN to call report to [phone]. [facility] [phone] to arranged transportation to [place] [address].   Requested [employer]

## 2020-04-24 NOTE — CM/SW NOTE
Dr Rico Borjas from [facility] said pt is appropriate for acute rehab. Pt is agreeable to go to  for acute rehab. Submitted for insurance auth through [facility] because pt has [employer]. Waiting for insurance auth from [facility].

## 2020-04-24 NOTE — WOUND PROGRESS NOTE
[facility]  Report of Inpatient Wound Care Progress Note    Priti Rees Patient Status:  Inpatient    1955 MRN [id number]   [facility] 7NE-A Attending Theora Master, [address] [facility] Day # 2 PCP Gerardo Duque MD       SUBJECTIVE:  No No      Allergies:  @ALLERGY    Laboratory Data:  Recent Labs   Lab 04/21/20  2317  04/22/20  0442  04/23/20  0505  04/23/20  1639 04/23/20  2148 04/24/20  0506 04/24/20  0846   WBC 10.3  --  10.5  --   --   --   --   --   --   --    HGB 13.1  --  12.7*  - or page me at #[phone] if you have any questions about this consultation and plan of care. If unable to reach me at these, please call the Inpatient Wound Care pager at #[phone]. Time Spent 30 Minutes. Thank you,    Nadeen Sanches.  Brooke Salgado, MPT [facility]

## 2020-04-24 NOTE — PLAN OF CARE
Assumed care at [address]. A&Ox4. Neuros q 4. No acute changes. Tele SB, on room air. C/o mild HA, PRN tylenol with relief. Dressing intact to left foot. Awaiting ins auth to [employer]. Will cont to monitor.

## 2020-04-25 NOTE — PLAN OF CARE
NURSING DISCHARGE NOTE    Discharged Rehab facility via Ambulance. Accompanied by Support staff  Belongings Taken by patient/family.     All consults signed off  Discharging to Eddy Kothari report to [facility]    Discharge & Diabetes education review

## 2020-04-27 NOTE — PAYOR COMM NOTE
--------------  DISCHARGE REVIEW    Payor: Lane Mckenzie #:  [id number]  Authorization Number: [id number]    Admit date: 4/22/20  Admit time:  0108  Discharge Date: 4/24/2020  7:30 PM     Admitting Physician: Trey Kaiser

## 2020-04-28 ENCOUNTER — TELEPHONE (OUTPATIENT)
Dept: CARDIOLOGY | Age: 65
End: 2020-04-28

## 2020-04-28 NOTE — CM/SW NOTE
04/24/20 1716   Discharge disposition   Expected discharge disposition Rehab [address]   Name of Kimberly [address]   Patient is Discharged to a [address] [place], [place] [address]d Yes   Discharge transportation Other (comment)  ([facility])

## 2020-04-29 ENCOUNTER — RESEARCH ENCOUNTER (OUTPATIENT)
Dept: CARDIOLOGY | Age: 65
End: 2020-04-29

## 2020-04-29 DIAGNOSIS — Z00.6 RESEARCH EXAM: Primary | ICD-10-CM

## 2020-05-01 NOTE — DISCHARGE SUMMARY
General Medicine Discharge Summary     Patient ID:  Saad Aaron  60year old  4/9/1955    Admit date: 4/21/2020    Discharge date and time:  4/24/20    Attending Physician: No att. providers found (decreased from 40U home dose). Scheduled aspart reduced to 5U TID (from 10U TID) + low dose SSI.  Increased Levemir to 40 nightly tonight.     # Chronic diabetic foot ulcers on L foot  - apprec wound care  - cont OP gabapentin for neuropathy     Prophy: SC Full Code      Exam on day of discharge:      04/24/20  1700   BP: 129/53   Pulse: 64   Resp: 13   Temp: 98.6 °F (37 °C)       See progress note from same day or exam on date of DC.     Total time coordinating care for discharge: > 30 minutes    Micaela RAUCSH

## 2020-05-08 PROBLEM — I62.9 INTRACRANIAL HEMORRHAGE (HCC): Status: RESOLVED | Noted: 2020-04-21 | Resolved: 2020-05-08

## 2020-05-14 ENCOUNTER — V-VISIT (OUTPATIENT)
Dept: CARDIOLOGY | Age: 65
End: 2020-05-14
Attending: INTERNAL MEDICINE

## 2020-05-14 VITALS
BODY MASS INDEX: 32.95 KG/M2 | WEIGHT: 265 LBS | TEMPERATURE: 97.6 F | HEIGHT: 75 IN | SYSTOLIC BLOOD PRESSURE: 135 MMHG | DIASTOLIC BLOOD PRESSURE: 60 MMHG

## 2020-05-14 DIAGNOSIS — E11.9 TYPE 2 DIABETES MELLITUS WITHOUT COMPLICATION, WITH LONG-TERM CURRENT USE OF INSULIN (CMD): ICD-10-CM

## 2020-05-14 DIAGNOSIS — E78.5 DYSLIPIDEMIA (HIGH LDL; LOW HDL): Primary | ICD-10-CM

## 2020-05-14 DIAGNOSIS — I10 ESSENTIAL HYPERTENSION: ICD-10-CM

## 2020-05-14 DIAGNOSIS — Z79.4 TYPE 2 DIABETES MELLITUS WITHOUT COMPLICATION, WITH LONG-TERM CURRENT USE OF INSULIN (CMD): ICD-10-CM

## 2020-05-14 PROCEDURE — 99244 OFF/OP CNSLTJ NEW/EST MOD 40: CPT | Performed by: INTERNAL MEDICINE

## 2020-05-14 RX ORDER — CEPHALEXIN 500 MG/1
500 CAPSULE ORAL
COMMUNITY
Start: 2020-05-05 | End: 2020-06-19

## 2020-05-14 RX ORDER — AMOXICILLIN 250 MG
2 CAPSULE ORAL
COMMUNITY
Start: 2018-03-06

## 2020-05-14 RX ORDER — ONDANSETRON 4 MG/1
4 TABLET, FILM COATED ORAL PRN
COMMUNITY

## 2020-05-14 RX ORDER — INSULIN GLARGINE 100 [IU]/ML
40 INJECTION, SOLUTION SUBCUTANEOUS
COMMUNITY
Start: 2018-04-09

## 2020-05-14 RX ORDER — GLIPIZIDE 10 MG/1
10 TABLET ORAL 2 TIMES DAILY
COMMUNITY
Start: 2018-02-04

## 2020-05-14 RX ORDER — DIPHENOXYLATE HYDROCHLORIDE AND ATROPINE SULFATE 2.5; .025 MG/1; MG/1
1 TABLET ORAL
COMMUNITY

## 2020-05-14 RX ORDER — ROSUVASTATIN CALCIUM 20 MG/1
20 TABLET, COATED ORAL DAILY
COMMUNITY
Start: 2020-04-07 | End: 2022-11-28 | Stop reason: SDUPTHER

## 2020-05-14 RX ORDER — HYDROCODONE BITARTRATE AND ACETAMINOPHEN 5; 325 MG/1; MG/1
1 TABLET ORAL PRN
COMMUNITY

## 2020-05-14 RX ORDER — VALSARTAN 320 MG/1
TABLET ORAL
COMMUNITY
Start: 2020-04-14 | End: 2020-12-29 | Stop reason: ALTCHOICE

## 2020-05-14 RX ORDER — ASPIRIN 325 MG
TABLET ORAL
COMMUNITY
Start: 2018-02-04

## 2020-05-14 RX ORDER — HYDRALAZINE HYDROCHLORIDE 25 MG/1
25 TABLET, FILM COATED ORAL DAILY
COMMUNITY
Start: 2020-05-13

## 2020-05-14 RX ORDER — INSULIN LISPRO 100 [IU]/ML
INJECTION, SOLUTION INTRAVENOUS; SUBCUTANEOUS
COMMUNITY
Start: 2020-05-08

## 2020-05-14 SDOH — HEALTH STABILITY: PHYSICAL HEALTH
ON AVERAGE, HOW MANY DAYS PER WEEK DO YOU ENGAGE IN MODERATE TO STRENUOUS EXERCISE (LIKE A BRISK WALK)?: PATIENT DECLINED

## 2020-05-14 SDOH — HEALTH STABILITY: MENTAL HEALTH: HOW MANY STANDARD DRINKS CONTAINING ALCOHOL DO YOU HAVE ON A TYPICAL DAY?: 1 OR 2

## 2020-05-14 SDOH — HEALTH STABILITY: MENTAL HEALTH
STRESS IS WHEN SOMEONE FEELS TENSE, NERVOUS, ANXIOUS, OR CAN'T SLEEP AT NIGHT BECAUSE THEIR MIND IS TROUBLED. HOW STRESSED ARE YOU?: ONLY A LITTLE

## 2020-05-14 SDOH — HEALTH STABILITY: MENTAL HEALTH: HOW OFTEN DO YOU HAVE 6 OR MORE DRINKS ON ONE OCCASION?: LESS THAN MONTHLY

## 2020-05-14 SDOH — HEALTH STABILITY: MENTAL HEALTH: HOW OFTEN DO YOU HAVE A DRINK CONTAINING ALCOHOL?: MONTHLY OR LESS

## 2020-05-14 SDOH — HEALTH STABILITY: PHYSICAL HEALTH: ON AVERAGE, HOW MANY MINUTES DO YOU ENGAGE IN EXERCISE AT THIS LEVEL?: PATIENT DECLINED

## 2020-05-14 ASSESSMENT — ENCOUNTER SYMPTOMS
SUSPICIOUS LESIONS: 0
ALLERGIC/IMMUNOLOGIC COMMENTS: NO NEW FOOD ALLERGIES
CHILLS: 0
WEIGHT LOSS: 0
FEVER: 0
HEMOPTYSIS: 0
WEIGHT GAIN: 0
BRUISES/BLEEDS EASILY: 0
HEMATOCHEZIA: 0
COUGH: 0

## 2020-05-18 ENCOUNTER — TELEPHONE (OUTPATIENT)
Dept: CARDIOLOGY | Age: 65
End: 2020-05-18

## 2020-05-18 RX ORDER — IRBESARTAN 300 MG/1
300 TABLET ORAL DAILY
Qty: 30 TABLET | Refills: 6 | Status: SHIPPED | OUTPATIENT
Start: 2020-05-18 | End: 2020-12-24 | Stop reason: SDUPTHER

## 2020-05-18 RX ORDER — AMLODIPINE BESYLATE 5 MG/1
5 TABLET ORAL EVERY EVENING
Qty: 30 TABLET | Refills: 6 | Status: SHIPPED | OUTPATIENT
Start: 2020-05-18 | End: 2020-12-24 | Stop reason: SDUPTHER

## 2020-05-22 ENCOUNTER — TELEPHONE (OUTPATIENT)
Dept: NEUROLOGY | Facility: CLINIC | Age: 65
End: 2020-05-22

## 2020-06-02 ENCOUNTER — OFFICE VISIT (OUTPATIENT)
Dept: WOUND CARE | Facility: HOSPITAL | Age: 65
End: 2020-06-02
Attending: NURSE PRACTITIONER
Payer: MEDICAID

## 2020-06-02 DIAGNOSIS — L97.402 DIABETIC ULCER OF HEEL ASSOCIATED WITH DIABETES MELLITUS DUE TO UNDERLYING CONDITION, WITH FAT LAYER EXPOSED, UNSPECIFIED LATERALITY (HCC): Primary | ICD-10-CM

## 2020-06-02 DIAGNOSIS — E08.621 DIABETIC ULCER OF HEEL ASSOCIATED WITH DIABETES MELLITUS DUE TO UNDERLYING CONDITION, WITH FAT LAYER EXPOSED, UNSPECIFIED LATERALITY (HCC): Primary | ICD-10-CM

## 2020-06-02 PROCEDURE — 99214 OFFICE O/P EST MOD 30 MIN: CPT

## 2020-06-02 PROCEDURE — 11055 PARING/CUTG B9 HYPRKER LES 1: CPT

## 2020-06-02 NOTE — PROGRESS NOTES
Subjective    Chief Complaint  This information was obtained from the patient  The patient is new to the [address] [facility] here for an initial visit for the evaluation and management of non-healing wound(s).  MD ordered: Pt is out of his boot and needs education 7/16/2019 This 62year old male comes in today with a diabetic ulcer on his left heel he has had for > 2 wks. Concerned because he has had prolonged wounds of the foot in the past and he has been a previous [facility] patient.   MD Requesting wound clini charcot foot  cellulitis (6/2018)  osteomyelitis left tibia (3/5/18)  exposed ortho hardware (2/19/18)  MRSA (3/13/12)  Hypertension  Hypokalemia  obesity  osteoarthrosis  osteopenia  Hyperlipidemia  restless leg syndrome  Type II Diabetes  Stroke    Surgi amlodipine - oral 5 mg 1 tablet  cephalexin - oral 500 mg 1 tablet every 6 hours as needed  Admelog SoloStar U-100 Insulin - subcutaneous 100 unit/mL insulin pen three times daily  Basaglar KwikPen U-100 Insulin - subcutaneous 100 unit/mL (3 mL) insulin pe S1/S2, regular rate. monophasic bilateral at dorsalis pedis and posterior tibial pulses via hand held doppler. BLE various veins, with hemosiderin staining, BLE's edema. Chest:  no chest obvious visual deformity.     Gastrointestinal (GI):  Positive alice Hair Growth on Extremity: Yes  Temperature of Extremity: Warm  Capillary Refill: < 3 seconds  Erythema: No  Dependent Rubor: No  Hyperpigmentation: Yes  Lipodermatosclerosis: No  Right Extremity colors, hair growth, and conditions:  Extremity Color: WNL  H Discussed the plan of care at the bedside with the patient. Reviewed hospital records. Plan of Care:  Diabetic Ulcer Management:  - Assess for peripheral neuropathy on ALL diabetic patients on admission, regardless of wound location.  (4 or more sites w Follow-Up Appointments:  A follow-up appointment should be scheduled.         Discussed the etiology and treatment of diabetic foot ulcer with thick callus formation on the charcot foot:  -callus management  -offloading at all the time     Also discussed ch

## 2020-06-16 ENCOUNTER — APPOINTMENT (OUTPATIENT)
Dept: WOUND CARE | Facility: HOSPITAL | Age: 65
End: 2020-06-16
Attending: NURSE PRACTITIONER
Payer: MEDICAID

## 2020-06-23 ENCOUNTER — APPOINTMENT (OUTPATIENT)
Dept: WOUND CARE | Facility: HOSPITAL | Age: 65
End: 2020-06-23
Attending: NURSE PRACTITIONER
Payer: MEDICAID

## 2020-06-30 ENCOUNTER — APPOINTMENT (OUTPATIENT)
Dept: WOUND CARE | Facility: HOSPITAL | Age: 65
End: 2020-06-30
Attending: NURSE PRACTITIONER
Payer: MEDICAID

## 2020-07-07 ENCOUNTER — APPOINTMENT (OUTPATIENT)
Dept: WOUND CARE | Facility: HOSPITAL | Age: 65
End: 2020-07-07
Attending: NURSE PRACTITIONER
Payer: MEDICAID

## 2020-07-08 ENCOUNTER — OFFICE VISIT (OUTPATIENT)
Dept: WOUND CARE | Facility: HOSPITAL | Age: 65
End: 2020-07-08
Attending: NURSE PRACTITIONER
Payer: MEDICAID

## 2020-07-08 DIAGNOSIS — E08.621 DIABETIC ULCER OF HEEL ASSOCIATED WITH DIABETES MELLITUS DUE TO UNDERLYING CONDITION, WITH FAT LAYER EXPOSED, UNSPECIFIED LATERALITY (HCC): ICD-10-CM

## 2020-07-08 DIAGNOSIS — L97.402 DIABETIC ULCER OF HEEL ASSOCIATED WITH DIABETES MELLITUS DUE TO UNDERLYING CONDITION, WITH FAT LAYER EXPOSED, UNSPECIFIED LATERALITY (HCC): ICD-10-CM

## 2020-07-08 PROCEDURE — 97597 DBRDMT OPN WND 1ST 20 CM/<: CPT

## 2020-07-08 PROCEDURE — 99214 OFFICE O/P EST MOD 30 MIN: CPT

## 2020-07-08 NOTE — PROGRESS NOTES
Subjective    Chief Complaint  This information was obtained from the patient  The patient is new to the [address] [facility] here for an initial visit for the evaluation and management of non-healing wound(s).  MD ordered: Pt is out of his boot and needs education 4/15/2019 He was previously treated in the wound clinic for a wound on his left foot and it had healed. He had transitioned to a shoe when he noticed his wound started opening up again on Thursday and was oozing.  He has neuropathy so he does not feel much Diabetes - Father, Heart Disease - Mother, Father, Lung Disease - Mother, Other - Mother:  heart disorder,heart surgery,pulmonary disease, Father: diabetes,heart disease    Social History  This information was obtained from the chart  Never smoker, Gastrointestinal (GI): Bowel Incontinence, Constipation, Nausea / Vomiting / Diarrhea (N/V/D)  Genitourinary (): Urinary Incontinence  Musculoskeletal: Assistive Devices, Decreased Activity  Integumentary (Hair/Skin/Nails): Rash  Neurological: Memory Los Wound #3c Left, Plantar Heel is a chronic James Grade 1 Diabetic Ulcer and has received a status of Not Healed. Subsequent wound encounter measurements are 0.2cm length x 0.2cm width x 1.5cm depth, with an area of 0.04 sq cm and a volume of 0.06 cubic cm. Biphasic bilateral pedal pulses and posterior tibial pulse via hand held doppler. BLE various veins, with scattered hemosiderin staining, left leg edema with Charcot foot . Chest:  no obvious deformity . Gastrointestinal (GI):  Positive bowel sounds. 1. Soft tissue swelling and ulceration at the plantar aspect of the calcaneus. Status post excisional debridement of the plantar aspect of the calcaneus.  Lucency/bony defect at the anterior plantar aspect of the calcaneus may be postsurgical, although oste Elevate leg(s) as much as possible. - higher than your heart three times a day for thirty min    Offloading  Other: - 2 layers of off loading to the foot    Additional Orders:    Misc / Additional orders  Supplement with a daily multivitamin.   Increase die - Offload the foot ulcer. (i.e. half shoe, surgical shoe, insert, custom shoe, felt and foam, cam walker, PTB brace, total contact cast, bi-valve cast, posterior splint removable walker, extra-depth shoe, rocker bottom shoe, custom Insert, L’Nard splint/mu Patient Name: David Stafford   Patient Number: [id number]  Patient YOB: 1955  Date: 7/8/2020  Facility: Outpatient  Debridement Performed for Assessment: Wound #3c Left, Plantar Heel  Performed By: Physician SONYA Hodgson  Debridement: S

## 2020-07-15 ENCOUNTER — APPOINTMENT (OUTPATIENT)
Dept: WOUND CARE | Facility: HOSPITAL | Age: 65
End: 2020-07-15
Attending: NURSE PRACTITIONER
Payer: MEDICAID

## 2020-07-17 ENCOUNTER — TELEPHONE (OUTPATIENT)
Dept: CARDIOLOGY | Age: 65
End: 2020-07-17

## 2020-07-22 ENCOUNTER — APPOINTMENT (OUTPATIENT)
Dept: WOUND CARE | Facility: HOSPITAL | Age: 65
End: 2020-07-22
Attending: NURSE PRACTITIONER
Payer: MEDICAID

## 2020-07-28 ENCOUNTER — TELEPHONE (OUTPATIENT)
Dept: CARDIOLOGY | Age: 65
End: 2020-07-28

## 2020-07-28 DIAGNOSIS — I38 ENDOCARDITIS, UNSPECIFIED CHRONICITY, UNSPECIFIED ENDOCARDITIS TYPE: ICD-10-CM

## 2020-07-28 DIAGNOSIS — I10 ESSENTIAL HYPERTENSION: Primary | ICD-10-CM

## 2020-07-29 ENCOUNTER — APPOINTMENT (OUTPATIENT)
Dept: WOUND CARE | Facility: HOSPITAL | Age: 65
End: 2020-07-29
Attending: NURSE PRACTITIONER
Payer: MEDICAID

## 2020-07-29 ENCOUNTER — TELEPHONE (OUTPATIENT)
Dept: CARDIOLOGY | Age: 65
End: 2020-07-29

## 2020-08-03 ENCOUNTER — OFFICE VISIT (OUTPATIENT)
Dept: WOUND CARE | Facility: HOSPITAL | Age: 65
End: 2020-08-03
Attending: NURSE PRACTITIONER
Payer: MEDICAID

## 2020-08-03 DIAGNOSIS — E11.621 DIABETIC FOOT ULCERS (HCC): ICD-10-CM

## 2020-08-03 DIAGNOSIS — L97.402 DIABETIC ULCER OF HEEL ASSOCIATED WITH DIABETES MELLITUS DUE TO UNDERLYING CONDITION, WITH FAT LAYER EXPOSED, UNSPECIFIED LATERALITY (HCC): ICD-10-CM

## 2020-08-03 DIAGNOSIS — E11.42 DIABETIC PERIPHERAL NEUROPATHY (HCC): Primary | ICD-10-CM

## 2020-08-03 DIAGNOSIS — E08.621 DIABETIC ULCER OF HEEL ASSOCIATED WITH DIABETES MELLITUS DUE TO UNDERLYING CONDITION, WITH FAT LAYER EXPOSED, UNSPECIFIED LATERALITY (HCC): ICD-10-CM

## 2020-08-03 DIAGNOSIS — L97.509 DIABETIC FOOT ULCERS (HCC): ICD-10-CM

## 2020-08-03 DIAGNOSIS — E13.610 CHARCOT'S JOINT DISEASE DUE TO SECONDARY DIABETES (HCC): ICD-10-CM

## 2020-08-03 PROCEDURE — 97161 PT EVAL LOW COMPLEX 20 MIN: CPT

## 2020-08-03 PROCEDURE — 97597 DBRDMT OPN WND 1ST 20 CM/<: CPT

## 2020-08-03 NOTE — PROGRESS NOTES
Subjective    Chief Complaint  This information was obtained from the patient  8/3/2020 \"I have a custom DM shoe and insert ordered through [facility] [employer] from DR. Gipson from [facility] but it's not in yet\".  \"Currently I am wearing a post op shoe but it 4/15/2019 He was previously treated in the wound clinic for a wound on his left foot and it had healed. He had transitioned to a shoe when he noticed his wound started opening up again on Thursday and was oozing.  He has neuropathy so he does not feel much 8/3/2020 PT eval for reoccurring plantar heal and mid foot wounds referred from [facility] from Dr. Jim Lee. Evaluated by APN in wound care clinic and transitioned to PT for PT eval and treatment.         Objective    Wound Assessment(s)  Wound #3c Left, Plantar Vitals Signs Notes: 8/3/2020: B/S this     Lower Extremity Assessment  Off-Loading:  Left off-loading device in use: Yes    General Notes:  currently wearing CLYDE post op shoe and advised to use CAM boot.         Assessment  Pt presents with 2 wounds Return Appointment in 1 week. - 30 min x 4 wks    Offloading  Pressure relief shoe / inserts / foams  Other:    Misc / Additional orders  Increase dietary protein intake. Exercise as tolerated. Moisturizer.  - legs daily  S/S of infection - monitor for S& Dressing secured with non-allergenic tape/stockinet/wrap. using Medipore (1)  Applied Compression device. Applied Offloading device.  using 1/4\" thick adhesive felt applied to periwound (2), 1/8\" thin adhesive felt applied to periwound (1), Other - see n

## 2020-08-05 ENCOUNTER — APPOINTMENT (OUTPATIENT)
Dept: WOUND CARE | Facility: HOSPITAL | Age: 65
End: 2020-08-05
Attending: NURSE PRACTITIONER
Payer: MEDICAID

## 2020-08-11 ENCOUNTER — OFFICE VISIT (OUTPATIENT)
Dept: WOUND CARE | Facility: HOSPITAL | Age: 65
End: 2020-08-11
Attending: NURSE PRACTITIONER
Payer: MEDICAID

## 2020-08-11 DIAGNOSIS — E11.621 DIABETIC FOOT ULCERS (HCC): ICD-10-CM

## 2020-08-11 DIAGNOSIS — L97.402 DIABETIC ULCER OF HEEL ASSOCIATED WITH DIABETES MELLITUS DUE TO UNDERLYING CONDITION, WITH FAT LAYER EXPOSED, UNSPECIFIED LATERALITY (HCC): ICD-10-CM

## 2020-08-11 DIAGNOSIS — E08.621 DIABETIC ULCER OF HEEL ASSOCIATED WITH DIABETES MELLITUS DUE TO UNDERLYING CONDITION, WITH FAT LAYER EXPOSED, UNSPECIFIED LATERALITY (HCC): ICD-10-CM

## 2020-08-11 DIAGNOSIS — L97.509 DIABETIC FOOT ULCERS (HCC): ICD-10-CM

## 2020-08-11 DIAGNOSIS — E11.42 DIABETIC PERIPHERAL NEUROPATHY (HCC): Primary | ICD-10-CM

## 2020-08-11 PROCEDURE — 97597 DBRDMT OPN WND 1ST 20 CM/<: CPT

## 2020-08-11 NOTE — PROGRESS NOTES
Subjective    Chief Complaint  This information was obtained from the patient  8/11/2020 \"I found my CAM boot and I have been wearing it again like we talked about to protect the foot\".     Allergies  Coffee (Reaction: vision problem)    HPI  This informa 4/15/2019 He was previously treated in the wound clinic for a wound on his left foot and it had healed. He had transitioned to a shoe when he noticed his wound started opening up again on Thursday and was oozing.  He has neuropathy so he does not feel much 8/3/2020 PT eval for reoccurring plantar heal and mid foot wounds referred from [facility] from Dr. Eddy Moya. Evaluated by APN in wound care clinic and transitioned to PT for PT eval and treatment.         Objective    Wound Assessment(s)  Wound #3c Left, Plantar Vitals Signs Notes: 8/11/2020: B/S this         Assessment    Pt presents with a boggy end feel with palpation of his heel region surrounding his wound.  The wound wound cleansed and non viable tissue around the wound margins and wound bed were select Limb cleansed using Betasept and water (1), Soap and water (1)  Cleansed wound and periwound with non-cytotoxic agent. using Wound Cleanser Spray (1)  Applied Primary Wound Dressing.  using ABD (1), Aquacel AG Extra 4x5 hydrofiber (1)  Dressing secured with Achieve wound closure to promote return to normal functional gait    Header Image    Debridement Details  Patient Name: Ramiro White   Patient Number: [id number]  Patient YOB: 1955  Date: 8/11/2020  Physical Therapist: Fatou Ramirez

## 2020-08-12 ENCOUNTER — APPOINTMENT (OUTPATIENT)
Dept: WOUND CARE | Facility: HOSPITAL | Age: 65
End: 2020-08-12
Attending: NURSE PRACTITIONER
Payer: MEDICAID

## 2020-08-18 ENCOUNTER — OFFICE VISIT (OUTPATIENT)
Dept: WOUND CARE | Facility: HOSPITAL | Age: 65
End: 2020-08-18
Attending: NURSE PRACTITIONER
Payer: MEDICAID

## 2020-08-18 DIAGNOSIS — L08.9 DIABETIC FOOT INFECTION (HCC): ICD-10-CM

## 2020-08-18 DIAGNOSIS — E13.610 CHARCOT'S JOINT DISEASE DUE TO SECONDARY DIABETES (HCC): ICD-10-CM

## 2020-08-18 DIAGNOSIS — E11.628 DIABETIC FOOT INFECTION (HCC): ICD-10-CM

## 2020-08-18 PROCEDURE — 97597 DBRDMT OPN WND 1ST 20 CM/<: CPT

## 2020-08-18 NOTE — PROGRESS NOTES
Subjective    Chief Complaint  This information was obtained from the patient  8/18/2020 \"I will try to stay off the foot a bit more\".     Allergies  Coffee (Reaction: vision problem)    HPI  This information was obtained from the patient  7/6/2020: [facility] 4/15/2019 He was previously treated in the wound clinic for a wound on his left foot and it had healed. He had transitioned to a shoe when he noticed his wound started opening up again on Thursday and was oozing.  He has neuropathy so he does not feel much 8/3/2020 PT eval for reoccurring plantar heal and mid foot wounds referred from [facility] [place] from Dr. Reji Proctor. Evaluated by APN in wound care clinic and transitioned to PT for PT eval and treatment.         Objective    Wound Assessment(s)  Wound #3c Left, Plantar Pt presents with a open draining heel wound with significant depth. Non vialbe tissue was selectively debrided to promote wound healing and reduce wound bio burden.  The pt is performing dressing changes at home every 2 days and performing 10 min Acetic Aci Cleansed wound and periwound with non-cytotoxic agent. using Wound Cleanser Spray (1)  Applied Primary Wound Dressing.  using Aquacel AG Extra 4x5 hydrofiber (1)  Dressing secured with non-allergenic tape/stockinet/wrap. using Medipore (1)  Applied Compress

## 2020-08-24 ENCOUNTER — HOSPITAL ENCOUNTER (OUTPATIENT)
Dept: CV DIAGNOSTICS | Facility: HOSPITAL | Age: 65
Discharge: HOME | End: 2020-08-24
Attending: INTERNAL MEDICINE
Payer: MEDICAID

## 2020-08-24 DIAGNOSIS — I10 ESSENTIAL HYPERTENSION: ICD-10-CM

## 2020-08-24 PROCEDURE — 93306 TTE W/DOPPLER COMPLETE: CPT | Performed by: INTERNAL MEDICINE

## 2020-08-25 ENCOUNTER — APPOINTMENT (OUTPATIENT)
Dept: WOUND CARE | Facility: HOSPITAL | Age: 65
End: 2020-08-25
Attending: NURSE PRACTITIONER
Payer: MEDICAID

## 2020-08-26 ENCOUNTER — OFFICE VISIT (OUTPATIENT)
Dept: WOUND CARE | Facility: HOSPITAL | Age: 65
End: 2020-08-26
Attending: NURSE PRACTITIONER
Payer: MEDICAID

## 2020-08-26 DIAGNOSIS — E13.610 CHARCOT'S JOINT DISEASE DUE TO SECONDARY DIABETES (HCC): Primary | ICD-10-CM

## 2020-08-26 DIAGNOSIS — E08.621 DIABETIC ULCER OF HEEL ASSOCIATED WITH DIABETES MELLITUS DUE TO UNDERLYING CONDITION, WITH FAT LAYER EXPOSED, UNSPECIFIED LATERALITY (HCC): ICD-10-CM

## 2020-08-26 DIAGNOSIS — L97.402 DIABETIC ULCER OF HEEL ASSOCIATED WITH DIABETES MELLITUS DUE TO UNDERLYING CONDITION, WITH FAT LAYER EXPOSED, UNSPECIFIED LATERALITY (HCC): ICD-10-CM

## 2020-08-26 PROCEDURE — 99213 OFFICE O/P EST LOW 20 MIN: CPT

## 2020-08-26 NOTE — PROGRESS NOTES
Subjective    Chief Complaint  This information was obtained from the patient  The patient was seen today for follow up and management of difficult to heal wound(s). L heel diabetic ulcer  8/26/20: \"I finished my IV antibiotics a few weeks ago.  My surgeon 4/15/2019 He was previously treated in the wound clinic for a wound on his left foot and it had healed. He had transitioned to a shoe when he noticed his wound started opening up again on Thursday and was oozing.  He has neuropathy so he does not feel much 8/3/2020 PT eval for reoccurring plantar heal and mid foot wounds referred from [facility] from Dr. Morena Jacobo. Evaluated by APN in wound care clinic and transitioned to PT for PT eval and treatment.         Objective    Wound Assessment(s)  Wound #3c Left, Plantar The periwound skin exhibited: Friable, Dry/Scaly.  The periwound skin did not exhibit: Brawny Induration, Edema, Excoriation, Induration, Callus, Crepitus, Fluctuance, Rash, Moist, Maceration, Atrophie Yeni, Cyanosis, Ecchymosis, Erythema, Hemosiderosis, Signed By: Date:  Angelica Barakat DPT, [address] 08/26/2020 1:08:35 PM       Entered By: Angelica Barakat on 08/26/2020 1:07:45 PM  Treatment Notes Summary  Wound #3c (Left, Plantar Heel)  . Wound Treatment Note  Assessed patient’s pain status and effectiveness of p Performed selective sharp debridement to wound  Tissue and other material debrided: using Callus (1), Devitalized epidermis (1)  Debridement instrument used: using Forceps (1), Scalpel (1)  Assured hemostasis using N/A (1)  Written PT Goals - . Short Term (

## 2020-08-27 ENCOUNTER — TELEPHONE (OUTPATIENT)
Dept: CARDIOLOGY | Age: 65
End: 2020-08-27

## 2020-08-28 ENCOUNTER — TELEPHONE (OUTPATIENT)
Dept: CARDIOLOGY | Age: 65
End: 2020-08-28

## 2020-08-28 DIAGNOSIS — E78.5 DYSLIPIDEMIA (HIGH LDL; LOW HDL): ICD-10-CM

## 2020-08-28 DIAGNOSIS — I10 ESSENTIAL HYPERTENSION: ICD-10-CM

## 2020-08-28 DIAGNOSIS — I38 ENDOCARDITIS, UNSPECIFIED CHRONICITY, UNSPECIFIED ENDOCARDITIS TYPE: Primary | ICD-10-CM

## 2020-08-31 ENCOUNTER — TELEPHONE (OUTPATIENT)
Dept: CARDIOLOGY | Age: 65
End: 2020-08-31

## 2020-09-01 ENCOUNTER — OFFICE VISIT (OUTPATIENT)
Dept: WOUND CARE | Facility: HOSPITAL | Age: 65
End: 2020-09-01
Attending: NURSE PRACTITIONER
Payer: MEDICAID

## 2020-09-01 ENCOUNTER — LAB ENCOUNTER (OUTPATIENT)
Dept: LAB | Facility: HOSPITAL | Age: 65
End: 2020-09-01
Attending: INTERNAL MEDICINE
Payer: MEDICAID

## 2020-09-01 DIAGNOSIS — I38 VALVULAR ENDOCARDITIS: Primary | ICD-10-CM

## 2020-09-01 DIAGNOSIS — E78.5 HYPERLIPEMIA: ICD-10-CM

## 2020-09-01 DIAGNOSIS — E11.621 DIABETIC FOOT ULCERS (HCC): ICD-10-CM

## 2020-09-01 DIAGNOSIS — E13.610 CHARCOT'S JOINT DISEASE DUE TO SECONDARY DIABETES (HCC): Primary | ICD-10-CM

## 2020-09-01 DIAGNOSIS — I10 ESSENTIAL HYPERTENSION, MALIGNANT: ICD-10-CM

## 2020-09-01 DIAGNOSIS — L97.509 DIABETIC FOOT ULCERS (HCC): ICD-10-CM

## 2020-09-01 LAB
ANION GAP SERPL CALC-SCNC: 3 MMOL/L (ref 0–18)
BASOPHILS # BLD AUTO: 0.09 X10(3) UL (ref 0–0.2)
BASOPHILS NFR BLD AUTO: 1 %
BUN BLD-MCNC: 14 MG/DL (ref 7–18)
BUN/CREAT SERPL: 13.9 (ref 10–20)
CALCIUM BLD-MCNC: 9.3 MG/DL (ref 8.5–10.1)
CHLORIDE SERPL-SCNC: 104 MMOL/L (ref 98–112)
CO2 SERPL-SCNC: 31 MMOL/L (ref 21–32)
CREAT BLD-MCNC: 1.01 MG/DL (ref 0.7–1.3)
DEPRECATED RDW RBC AUTO: 49.3 FL (ref 35.1–46.3)
EOSINOPHIL # BLD AUTO: 0.33 X10(3) UL (ref 0–0.7)
EOSINOPHIL NFR BLD AUTO: 3.7 %
ERYTHROCYTE [DISTWIDTH] IN BLOOD BY AUTOMATED COUNT: 15.4 % (ref 11–15)
GLUCOSE BLD-MCNC: 106 MG/DL (ref 70–99)
HCT VFR BLD AUTO: 39.4 % (ref 39–53)
HGB BLD-MCNC: 12.8 G/DL (ref 13–17.5)
IMM GRANULOCYTES # BLD AUTO: 0.02 X10(3) UL (ref 0–1)
IMM GRANULOCYTES NFR BLD: 0.2 %
LYMPHOCYTES # BLD AUTO: 1.97 X10(3) UL (ref 1–4)
LYMPHOCYTES NFR BLD AUTO: 22 %
MCH RBC QN AUTO: 28.3 PG (ref 26–34)
MCHC RBC AUTO-ENTMCNC: 32.5 G/DL (ref 31–37)
MCV RBC AUTO: 87.2 FL (ref 80–100)
MONOCYTES # BLD AUTO: 0.71 X10(3) UL (ref 0.1–1)
MONOCYTES NFR BLD AUTO: 7.9 %
NEUTROPHILS # BLD AUTO: 5.82 X10 (3) UL (ref 1.5–7.7)
NEUTROPHILS # BLD AUTO: 5.82 X10(3) UL (ref 1.5–7.7)
NEUTROPHILS NFR BLD AUTO: 65.2 %
OSMOLALITY SERPL CALC.SUM OF ELEC: 287 MOSM/KG (ref 275–295)
PATIENT FASTING Y/N/NP: NO
PLATELET # BLD AUTO: 269 10(3)UL (ref 150–450)
POTASSIUM SERPL-SCNC: 4.1 MMOL/L (ref 3.5–5.1)
RBC # BLD AUTO: 4.52 X10(6)UL (ref 4.3–5.7)
SODIUM SERPL-SCNC: 138 MMOL/L (ref 136–145)
WBC # BLD AUTO: 8.9 X10(3) UL (ref 4–11)

## 2020-09-01 PROCEDURE — 80048 BASIC METABOLIC PNL TOTAL CA: CPT

## 2020-09-01 PROCEDURE — 85025 COMPLETE CBC W/AUTO DIFF WBC: CPT

## 2020-09-01 PROCEDURE — 36415 COLL VENOUS BLD VENIPUNCTURE: CPT

## 2020-09-01 PROCEDURE — 97597 DBRDMT OPN WND 1ST 20 CM/<: CPT

## 2020-09-01 NOTE — PROGRESS NOTES
Subjective    Chief Complaint  This information was obtained from the patient  9/1/2020 \"I feel good and the wound smells clean when I change the dressings at home and I am packing the area with acetic acid 10 min during each dressing change\".     Meka Chand 4/15/2019 He was previously treated in the wound clinic for a wound on his left foot and it had healed. He had transitioned to a shoe when he noticed his wound started opening up again on Thursday and was oozing.  He has neuropathy so he does not feel much 8/3/2020 PT eval for reoccurring plantar heal and mid foot wounds referred from [facility] from Dr. Comfort Paredes. Evaluated by APN in wound care clinic and transitioned to PT for PT eval and treatment.         Objective    Wound Assessment(s)  Wound #3c Left, Plantar Wound #3c (Diabetic Ulcer) is located on the left, plantar heel. A selective debridement was performed by Evelin Castañeda PT. Plan  Continue per POC as above to decreased wound volume and progress to a off loading boot as medical progress allows. Facility: Outpatient  Debridement Performed for Assessment: Wound #3c Left, Plantar Heel  Performed By: Clinician Crissy Wang, PT  Debridement: Selective  Photos  Photo      Entered By: Crissy Wang on 82/78/9965 1:46:07 PM  Signature(s): Date(s):

## 2020-09-07 ENCOUNTER — APPOINTMENT (OUTPATIENT)
Dept: LAB | Facility: HOSPITAL | Age: 65
End: 2020-09-07
Attending: INTERNAL MEDICINE
Payer: MEDICAID

## 2020-09-07 DIAGNOSIS — R09.89 ENDOCARDITIS, SUSPECTED: ICD-10-CM

## 2020-09-08 ENCOUNTER — OFFICE VISIT (OUTPATIENT)
Dept: WOUND CARE | Facility: HOSPITAL | Age: 65
End: 2020-09-08
Attending: NURSE PRACTITIONER
Payer: MEDICAID

## 2020-09-08 DIAGNOSIS — L97.509 DIABETIC FOOT ULCERS (HCC): ICD-10-CM

## 2020-09-08 DIAGNOSIS — E11.621 DIABETIC FOOT ULCERS (HCC): ICD-10-CM

## 2020-09-08 DIAGNOSIS — E13.610 CHARCOT'S JOINT DISEASE DUE TO SECONDARY DIABETES (HCC): Primary | ICD-10-CM

## 2020-09-08 LAB
SARS-COV-2 RNA RESP QL NAA+PROBE: NOT DETECTED
SARS-COV-2 RNA SPEC QL NAA+PROBE: NOT DETECTED
SPECIMEN SOURCE: NORMAL

## 2020-09-08 PROCEDURE — 97597 DBRDMT OPN WND 1ST 20 CM/<: CPT

## 2020-09-08 NOTE — PROGRESS NOTES
Subjective    Chief Complaint  This information was obtained from the patient  9/8/2020 \"I feel ok the needs to be changed 2-3 times per week so I have been doing that and soaking 10 min with acetic acid like we talked about every dressing change\".     Al 4/15/2019 He was previously treated in the wound clinic for a wound on his left foot and it had healed. He had transitioned to a shoe when he noticed his wound started opening up again on Thursday and was oozing.  He has neuropathy so he does not feel much 8/3/2020 PT eval for reoccurring plantar heal and mid foot wounds referred from [place] from Dr. Ignacio Mondragon. Evaluated by APN in wound care clinic and transitioned to PT for PT eval and treatment.         Objective    Wound Assessment(s)  Wound #3c Left, Plantar (Encounter Diagnosis) I87.312 - Chronic venous hypertension (idiopathic) with ulcer of left lower extremity        Plan  Continue per POC as above to decrease wound volume and promote self care as medical progress allows. Additional Orders:     Follow-U Debridement instrument used: using Curette (1)  Assured hemostasis using Pressure (1)  Written PT Goals - . Short Term (4-6 weeks)  Reduce necrosis by 100%  Decrease depth by 75%  Written PT Goals - Long Term (8-12 weeks)  Reduce wound area by 100%  Wound c

## 2020-09-08 NOTE — HISTORICAL OFFICE NOTE
Patient Instructions  - documented in this encounter  Patient Instructions  Fatoumata Mendoza MD - 05/14/2020 9:20 AM CDT    See me at [facility] in 6 months.  Your appointment is on Nov 5, 2020 at 11:20 am.    Electronically signed by Naina Valentin MD • Foot surgery Bilateral   at [facility] Hx:  No family history of premature atherosclerosis    Social Hx:  he reports that he has never smoked.  He has never used smokeless tobacco. He reports current alcohol use of about 1.0 standard drinks of alco Hematologic/Lymphatic: Does not bruise/bleed easily. Skin: Negative for rash and suspicious lesions. Musculoskeletal: Negative for arthritis. Gastrointestinal: Negative for hematochezia and melena. Genitourinary: Negative for hematuria.    Neurologi 2. Type 2 diabetes, well controlled with insulin with a hemoglobin A1c of 7.0  3.  Obesity, the patient is currently undergoing a weight loss program and we discussed weight watchers with careful observation of his glucoses to avoid hypoglycemia during weig

## 2020-09-09 ENCOUNTER — HOSPITAL ENCOUNTER (OUTPATIENT)
Dept: CV DIAGNOSTICS | Facility: HOSPITAL | Age: 65
Discharge: HOME | End: 2020-09-09
Attending: INTERNAL MEDICINE
Payer: MEDICAID

## 2020-09-09 ENCOUNTER — HOSPITAL ENCOUNTER (OUTPATIENT)
Dept: INTERVENTIONAL RADIOLOGY/VASCULAR | Facility: HOSPITAL | Age: 65
Discharge: HOME | End: 2020-09-09
Attending: INTERNAL MEDICINE | Admitting: INTERNAL MEDICINE
Payer: MEDICAID

## 2020-09-09 VITALS
RESPIRATION RATE: 11 BRPM | HEIGHT: 75 IN | WEIGHT: 265 LBS | HEART RATE: 45 BPM | TEMPERATURE: 98 F | SYSTOLIC BLOOD PRESSURE: 112 MMHG | OXYGEN SATURATION: 100 % | DIASTOLIC BLOOD PRESSURE: 53 MMHG | BODY MASS INDEX: 32.95 KG/M2

## 2020-09-09 DIAGNOSIS — R09.89 ENDOCARDITIS, SUSPECTED: Primary | ICD-10-CM

## 2020-09-09 DIAGNOSIS — R09.89 ENDOCARDITIS, SUSPECTED: ICD-10-CM

## 2020-09-09 LAB — GLUCOSE BLD-MCNC: 139 MG/DL (ref 70–99)

## 2020-09-09 PROCEDURE — 82962 GLUCOSE BLOOD TEST: CPT

## 2020-09-09 PROCEDURE — 93320 DOPPLER ECHO COMPLETE: CPT | Performed by: INTERNAL MEDICINE

## 2020-09-09 PROCEDURE — 93325 DOPPLER ECHO COLOR FLOW MAPG: CPT | Performed by: INTERNAL MEDICINE

## 2020-09-09 PROCEDURE — B245ZZ4 ULTRASONOGRAPHY OF LEFT HEART, TRANSESOPHAGEAL: ICD-10-PCS | Performed by: INTERNAL MEDICINE

## 2020-09-09 PROCEDURE — 99152 MOD SED SAME PHYS/QHP 5/>YRS: CPT

## 2020-09-09 PROCEDURE — 99152 MOD SED SAME PHYS/QHP 5/>YRS: CPT | Performed by: INTERNAL MEDICINE

## 2020-09-09 PROCEDURE — 93312 ECHO TRANSESOPHAGEAL: CPT

## 2020-09-09 PROCEDURE — 93312 ECHO TRANSESOPHAGEAL: CPT | Performed by: INTERNAL MEDICINE

## 2020-09-09 RX ORDER — MIDAZOLAM HYDROCHLORIDE 1 MG/ML
INJECTION INTRAMUSCULAR; INTRAVENOUS
Status: COMPLETED
Start: 2020-09-09 | End: 2020-09-09

## 2020-09-09 RX ORDER — SODIUM CHLORIDE 9 MG/ML
INJECTION, SOLUTION INTRAVENOUS
Status: COMPLETED | OUTPATIENT
Start: 2020-09-10 | End: 2020-09-09

## 2020-09-09 RX ORDER — MIDAZOLAM HYDROCHLORIDE 1 MG/ML
4 INJECTION INTRAMUSCULAR; INTRAVENOUS ONCE
Status: COMPLETED | OUTPATIENT
Start: 2020-09-09 | End: 2020-09-09

## 2020-09-09 RX ADMIN — SODIUM CHLORIDE: 9 INJECTION, SOLUTION INTRAVENOUS at 12:35:00

## 2020-09-09 RX ADMIN — MIDAZOLAM HYDROCHLORIDE 4 MG: 1 INJECTION INTRAMUSCULAR; INTRAVENOUS at 13:34:00

## 2020-09-09 NOTE — H&P
History & Physical Examination    Patient Name: Jolene Aldana  MRN: [id number]  CSN: [id number]  YOB: 1955    Diagnosis:  History of MSSA bacteremia    History of Present Illness: Jolene Aldana is a 60year old male with prior foot surgery an (40 mg total) by mouth daily.  Before meal, Disp: 30 tablet, Rfl: 1, Unknown at Unknown time  Ondansetron HCl (ZOFRAN) 4 mg tablet, Take 1 tablet (4 mg total) by mouth every 6 (six) hours as needed for Nausea., Disp: 45 tablet, Rfl: 2, Unknown at Unknown ti FX/DISLOC ELBOW     • OTHER SURGICAL HISTORY  2011, 2012    bilateral foot surgery for Charcot joint   • TONSILLECTOMY  16 y/o     Family History   Problem Relation Age of Onset   • Heart Disease Father    • Diabetes Father    • Heart Disorder Mother

## 2020-09-09 NOTE — PROGRESS NOTES
S/P JANESSA . Hemodynamics remain stable. Patient A and O x 4. Dr Dylan Reese  at bedside , all questions answered. Patient denies pain. Discharged instructions given. Informed to call Dr Dylan Reese office to speak to RN to discuss with results and further follow up.  Pt un

## 2020-09-09 NOTE — PROCEDURES
Cardiology Transesophageal Echo Note    PRE-PROCEDURE DIAGNOSIS:   MSSA bacteremia    PROCEDURE: Transesophageal Echocardiogram.    SEDATION:   Topical spray x 1  Versed: 4 mg  Fentanyl: 100 mcg  I personally supervised the intravenous administration of co technique.     Gopal Faustin MD  9/9/2020  1:52 PM

## 2020-09-10 ENCOUNTER — TELEPHONE (OUTPATIENT)
Dept: CARDIOLOGY | Age: 65
End: 2020-09-10

## 2020-09-11 NOTE — CM/SW NOTE
Blood culture set #1 drawn from right [facility] with 20g angio. Bottle tops scrubbed with alcohol pads. Site prepped with [employer] swab, 15 seconds per side, and allowed to dry for 30 seconds prior to venipuncture. Red waste tube drawn prior to collection of specimen.          Chris Singh RN  09/11/20 [id number] Response from [id number] Hallie Weaver says pt's case has been sent to a nurse for review - pt's case #[id number].

## 2020-09-15 ENCOUNTER — OFFICE VISIT (OUTPATIENT)
Dept: WOUND CARE | Facility: HOSPITAL | Age: 65
End: 2020-09-15
Attending: NURSE PRACTITIONER
Payer: MEDICAID

## 2020-09-15 DIAGNOSIS — L08.9 DIABETIC FOOT INFECTION (HCC): ICD-10-CM

## 2020-09-15 DIAGNOSIS — E11.621 DIABETIC FOOT ULCERS (HCC): ICD-10-CM

## 2020-09-15 DIAGNOSIS — L97.509 DIABETIC FOOT ULCER (HCC): Primary | ICD-10-CM

## 2020-09-15 DIAGNOSIS — E11.621 DIABETIC FOOT ULCER (HCC): Primary | ICD-10-CM

## 2020-09-15 DIAGNOSIS — E13.610 CHARCOT'S JOINT DISEASE DUE TO SECONDARY DIABETES (HCC): ICD-10-CM

## 2020-09-15 DIAGNOSIS — L97.509 DIABETIC FOOT ULCERS (HCC): ICD-10-CM

## 2020-09-15 DIAGNOSIS — E11.628 DIABETIC FOOT INFECTION (HCC): ICD-10-CM

## 2020-09-15 PROCEDURE — 97597 DBRDMT OPN WND 1ST 20 CM/<: CPT

## 2020-09-15 NOTE — PROGRESS NOTES
Subjective    Chief Complaint  This information was obtained from the patient  9/15/2020 \"I usually have to change the dressing 1 x/wk at home about 3-4 days after I come into the [facility] and I have been doing a 10 min soak with Acetic Acid as we talked about 4/15/2019 He was previously treated in the wound clinic for a wound on his left foot and it had healed. He had transitioned to a shoe when he noticed his wound started opening up again on Thursday and was oozing.  He has neuropathy so he does not feel much 8/3/2020 PT eval for reoccurring plantar heal and mid foot wounds referred from [facility] from Dr. Daina Tolentino. Evaluated by APN in wound care clinic and transitioned to PT for PT eval and treatment.         Objective    Wound Assessment(s)  Wound #3c Left, Plantar Continue per POC as above to resolve wound and transition to a custom boot and insert as progress allows. Additional Orders:     Follow-Up Appointments  Return Appointment: - 30 min weekly x 4 weeks    Offloading  Pressure relief shoe / inserts / foams Reduce necrosis by 100%  Decrease depth by 75%  Written PT Goals - Long Term (8-12 weeks)  Reduce wound area by 100%  Wound closure  Patient to obtain proper diabetic shoe wear  Achieve wound closure to promote return to normal functional gait

## 2020-09-22 ENCOUNTER — OFFICE VISIT (OUTPATIENT)
Dept: WOUND CARE | Facility: HOSPITAL | Age: 65
End: 2020-09-22
Attending: NURSE PRACTITIONER
Payer: MEDICAID

## 2020-09-22 DIAGNOSIS — L97.509 DIABETIC FOOT ULCER (HCC): Primary | ICD-10-CM

## 2020-09-22 DIAGNOSIS — L08.9 DIABETIC FOOT INFECTION (HCC): ICD-10-CM

## 2020-09-22 DIAGNOSIS — E11.621 DIABETIC FOOT ULCER (HCC): Primary | ICD-10-CM

## 2020-09-22 DIAGNOSIS — E11.628 DIABETIC FOOT INFECTION (HCC): ICD-10-CM

## 2020-09-22 DIAGNOSIS — E13.610 CHARCOT'S JOINT DISEASE DUE TO SECONDARY DIABETES (HCC): ICD-10-CM

## 2020-09-22 DIAGNOSIS — L97.402 DIABETIC ULCER OF HEEL ASSOCIATED WITH DIABETES MELLITUS DUE TO UNDERLYING CONDITION, WITH FAT LAYER EXPOSED, UNSPECIFIED LATERALITY (HCC): ICD-10-CM

## 2020-09-22 DIAGNOSIS — E08.621 DIABETIC ULCER OF HEEL ASSOCIATED WITH DIABETES MELLITUS DUE TO UNDERLYING CONDITION, WITH FAT LAYER EXPOSED, UNSPECIFIED LATERALITY (HCC): ICD-10-CM

## 2020-09-22 PROCEDURE — 99213 OFFICE O/P EST LOW 20 MIN: CPT

## 2020-09-22 NOTE — PROGRESS NOTES
Subjective    Chief Complaint  This information was obtained from the patient  9/22/2020 \"I had to walk a lot I traveled to [place] and went to a car rally\".     Allergies  Coffee (Reaction: vision problem)    HPI  This information was obtained from the patien 4/15/2019 He was previously treated in the wound clinic for a wound on his left foot and it had healed. He had transitioned to a shoe when he noticed his wound started opening up again on Thursday and was oozing.  He has neuropathy so he does not feel much 8/3/2020 PT eval for reoccurring plantar heal and mid foot wounds referred from [facility] from Dr. Michael Mccord. Evaluated by APN in wound care clinic and transitioned to PT for PT eval and treatment.         Objective    Wound Assessment(s)  Wound #3c Left, Plantar Active Problems    ICD-10  (Encounter Diagnosis) E11.621 - Type 2 diabetes mellitus with foot ulcer  (Encounter Diagnosis) I87.312 - Chronic venous hypertension (idiopathic) with ulcer of left lower extremity        Plan  Continue per POC to resolve wound

## 2020-09-29 ENCOUNTER — OFFICE VISIT (OUTPATIENT)
Dept: WOUND CARE | Facility: HOSPITAL | Age: 65
End: 2020-09-29
Attending: NURSE PRACTITIONER
Payer: MEDICAID

## 2020-09-29 DIAGNOSIS — L97.509 DIABETIC FOOT ULCER (HCC): ICD-10-CM

## 2020-09-29 DIAGNOSIS — E11.621 DIABETIC FOOT ULCER (HCC): ICD-10-CM

## 2020-09-29 DIAGNOSIS — E13.610 CHARCOT'S JOINT DISEASE DUE TO SECONDARY DIABETES (HCC): Primary | ICD-10-CM

## 2020-09-29 PROCEDURE — 99213 OFFICE O/P EST LOW 20 MIN: CPT

## 2020-09-29 NOTE — PROGRESS NOTES
Subjective    Chief Complaint  This information was obtained from the patient  9/29/2020 \"I feel good and I have supplies still at home\".     Allergies  Coffee (Reaction: vision problem)    HPI  This information was obtained from the patient  7/6/2020: Terril Dance 4/15/2019 He was previously treated in the wound clinic for a wound on his left foot and it had healed. He had transitioned to a shoe when he noticed his wound started opening up again on Thursday and was oozing.  He has neuropathy so he does not feel much 8/3/2020 PT eval for reoccurring plantar heal and mid foot wounds referred from [place] from Dr. Serg Chavarria. Evaluated by APN in wound care clinic and transitioned to PT for PT eval and treatment.         Objective    Wound Assessment(s)  Wound #3c Left, Plantar (Encounter Diagnosis) E11.621 - Type 2 diabetes mellitus with foot ulcer  (Encounter Diagnosis) I87.312 - Chronic venous hypertension (idiopathic) with ulcer of left lower extremity        Plan  Continue per POC to resolve wound and transition to self care Wound closure  Patient to obtain proper diabetic shoe wear  Achieve wound closure to promote return to normal functional gait

## 2020-09-30 ENCOUNTER — E-ADVICE (OUTPATIENT)
Dept: CARDIOLOGY | Age: 65
End: 2020-09-30

## 2020-10-06 ENCOUNTER — OFFICE VISIT (OUTPATIENT)
Dept: WOUND CARE | Facility: HOSPITAL | Age: 65
End: 2020-10-06
Attending: NURSE PRACTITIONER
Payer: MEDICAID

## 2020-10-06 DIAGNOSIS — L97.402 DIABETIC ULCER OF HEEL ASSOCIATED WITH DIABETES MELLITUS DUE TO UNDERLYING CONDITION, WITH FAT LAYER EXPOSED, UNSPECIFIED LATERALITY (HCC): ICD-10-CM

## 2020-10-06 DIAGNOSIS — E08.621 DIABETIC ULCER OF HEEL ASSOCIATED WITH DIABETES MELLITUS DUE TO UNDERLYING CONDITION, WITH FAT LAYER EXPOSED, UNSPECIFIED LATERALITY (HCC): ICD-10-CM

## 2020-10-06 DIAGNOSIS — L97.509 DIABETIC FOOT ULCER (HCC): ICD-10-CM

## 2020-10-06 DIAGNOSIS — E13.610 CHARCOT'S JOINT DISEASE DUE TO SECONDARY DIABETES (HCC): Primary | ICD-10-CM

## 2020-10-06 DIAGNOSIS — E11.621 DIABETIC FOOT ULCER (HCC): ICD-10-CM

## 2020-10-06 PROCEDURE — 97597 DBRDMT OPN WND 1ST 20 CM/<: CPT

## 2020-10-06 NOTE — PROGRESS NOTES
Subjective    Chief Complaint  This information was obtained from the patient  10/6/2020 \"I feel ok just a little sore in the heel and lateral side of my foot\".     Allergies  Coffee (Reaction: vision problem)    HPI  This information was obtained from th 4/15/2019 He was previously treated in the wound clinic for a wound on his left foot and it had healed. He had transitioned to a shoe when he noticed his wound started opening up again on Thursday and was oozing.  He has neuropathy so he does not feel much 8/3/2020 PT eval for reoccurring plantar heal and mid foot wounds referred from [facility] from Dr. Ignacio Mondragon. Evaluated by APN in wound care clinic and transitioned to PT for PT eval and treatment.         Objective    Wound Assessment(s)  Wound #3c Left, Plantar S/S of infection - monitor for S&S of soft tissue infection such as fever, chills, erythema, increased foul odor and drainage. Other: - glycemic control     Physician Review:  Reviewed and evaluated labs.   Discussed the plan of care at the bedside w

## 2020-10-13 ENCOUNTER — OFFICE VISIT (OUTPATIENT)
Dept: WOUND CARE | Facility: HOSPITAL | Age: 65
End: 2020-10-13
Attending: NURSE PRACTITIONER
Payer: MEDICAID

## 2020-10-13 DIAGNOSIS — L97.509 DIABETIC FOOT ULCER (HCC): ICD-10-CM

## 2020-10-13 DIAGNOSIS — E11.621 DIABETIC FOOT ULCER (HCC): ICD-10-CM

## 2020-10-13 DIAGNOSIS — E13.610 CHARCOT'S JOINT DISEASE DUE TO SECONDARY DIABETES (HCC): Primary | ICD-10-CM

## 2020-10-13 PROCEDURE — 99213 OFFICE O/P EST LOW 20 MIN: CPT

## 2020-10-13 NOTE — PROGRESS NOTES
Subjective    Chief Complaint  This information was obtained from the patient  10/13/2020 \"I feel ok and I plan to go to [place] for a work trip this week and I will be back in time for my next [facility] appointment\".     Allergies  Coffee (Reaction: vision probl 4/15/2019 He was previously treated in the wound clinic for a wound on his left foot and it had healed. He had transitioned to a shoe when he noticed his wound started opening up again on Thursday and was oozing.  He has neuropathy so he does not feel much 8/3/2020 PT eval for reoccurring plantar heal and mid foot wounds referred from [place] from Dr. Kelsy Smith. Evaluated by APN in wound care clinic and transitioned to PT for PT eval and treatment.         Objective    Wound Assessment(s)  Wound #3c Left, Plantar Height/Length: 75 in (190.5 cm), Weight: 267 lbs (121.36 kgs), BMI: 33.4, Temperature: 97.8 °F (36.56 °C), Pulse: 66 bpm, Respiratory Rate: 18 breaths/min, Blood Pressure: 141/67 mmHg, Pulse Oximetry: 100 %.   Vitals Signs Notes: 10/13/20 blood sugar this a Treatment Notes Summary  Wound #3c (Left, Plantar Heel)  . Wound Treatment Note  Assessed patient’s pain status and effectiveness of pain management plan.   Limb cleansed using Betasept and water (1), Soap and water (1)  Cleansed wound and periwound with non

## 2020-10-20 ENCOUNTER — OFFICE VISIT (OUTPATIENT)
Dept: WOUND CARE | Facility: HOSPITAL | Age: 65
End: 2020-10-20
Attending: NURSE PRACTITIONER
Payer: MEDICAID

## 2020-10-20 DIAGNOSIS — L97.402 DIABETIC ULCER OF HEEL ASSOCIATED WITH DIABETES MELLITUS DUE TO UNDERLYING CONDITION, WITH FAT LAYER EXPOSED, UNSPECIFIED LATERALITY (HCC): Primary | ICD-10-CM

## 2020-10-20 DIAGNOSIS — E08.621 DIABETIC ULCER OF HEEL ASSOCIATED WITH DIABETES MELLITUS DUE TO UNDERLYING CONDITION, WITH FAT LAYER EXPOSED, UNSPECIFIED LATERALITY (HCC): Primary | ICD-10-CM

## 2020-10-20 PROCEDURE — 97597 DBRDMT OPN WND 1ST 20 CM/<: CPT

## 2020-10-20 NOTE — PROGRESS NOTES
Subjective    Chief Complaint  This information was obtained from the patient  10/20/2020 \"I feel good but I walked a lot while I was out of state and I was a bit dehydrated\".     Allergies  Coffee (Reaction: vision problem)    HPI  This information was o 4/15/2019 He was previously treated in the wound clinic for a wound on his left foot and it had healed. He had transitioned to a shoe when he noticed his wound started opening up again on Thursday and was oozing.  He has neuropathy so he does not feel much 8/3/2020 PT eval for reoccurring plantar heal and mid foot wounds referred from [facility] from Dr. Rio Silverio. Evaluated by APN in wound care clinic and transitioned to PT for PT eval and treatment.         Objective    Wound Assessment(s)  Wound #3c Left, Plantar Height/Length: 75 in (190.5 cm), Weight: 267 lbs (121.36 kgs), BMI: 33.4, Temperature: 98.6 °F (37 °C), Pulse: 60 bpm, Respiratory Rate: 16 breaths/min, Blood Pressure: 146/66 mmHg, Pulse Oximetry: 99 %.   Vitals Signs Notes: 10/20/20 blood sugar this am wa Cleansed wound and periwound with non-cytotoxic agent. using Wound Cleanser Spray (1)  Applied Primary Wound Dressing. using Aquacel AG Extra 4x5 hydrofiber (2)  Applied Secondary Wound Dressing.  using Aquacel 6x6 nonadhesive foam (1)  Dressing secured wit

## 2020-10-27 ENCOUNTER — OFFICE VISIT (OUTPATIENT)
Dept: WOUND CARE | Facility: HOSPITAL | Age: 65
End: 2020-10-27
Attending: NURSE PRACTITIONER
Payer: MEDICAID

## 2020-10-27 DIAGNOSIS — E11.621 DIABETIC FOOT ULCER (HCC): Primary | ICD-10-CM

## 2020-10-27 DIAGNOSIS — L97.509 DIABETIC FOOT ULCER (HCC): Primary | ICD-10-CM

## 2020-10-27 DIAGNOSIS — E13.610 CHARCOT'S JOINT DISEASE DUE TO SECONDARY DIABETES (HCC): ICD-10-CM

## 2020-10-27 PROCEDURE — 99213 OFFICE O/P EST LOW 20 MIN: CPT

## 2020-10-27 NOTE — PROGRESS NOTES
Subjective    Chief Complaint  This information was obtained from the patient  10/27/2020 \"I plan to travel to [facility] this week for a job with the [employer] and I will be back for my next appointment\".     Allergies  Coffee (Reaction: vision problem)    HPI 4/15/2019 He was previously treated in the wound clinic for a wound on his left foot and it had healed. He had transitioned to a shoe when he noticed his wound started opening up again on Thursday and was oozing.  He has neuropathy so he does not feel much 8/3/2020 PT eval for reoccurring plantar heal and mid foot wounds referred from [facility] from Dr. Ksenia Verdin. Evaluated by APN in wound care clinic and transitioned to PT for PT eval and treatment.         Objective    Wound Assessment(s)  Wound #3c Left, Plantar Height/Length: 75 in (190.5 cm), Weight: 267 lbs (121.36 kgs), BMI: 33.4, Temperature: 97 °F (36.11 °C), Pulse: 57 bpm, Respiratory Rate: 16 breaths/min, Blood Pressure: 160/72 mmHg, Pulse Oximetry: 100 %.   Vitals Signs Notes: 10/27/20 blood sugar this am Limb cleansed using Betasept and water (1), Soap and water (1)  Cleansed wound and periwound with non-cytotoxic agent. using Wound Cleanser Spray (1)  Applied Primary Wound Dressing.  using Aquacel 6x6 nonadhesive foam (1), Aquacel AG Extra 4x5 hydrofiber (

## 2020-11-03 ENCOUNTER — OFFICE VISIT (OUTPATIENT)
Dept: WOUND CARE | Facility: HOSPITAL | Age: 65
End: 2020-11-03
Attending: NURSE PRACTITIONER
Payer: MEDICAID

## 2020-11-03 DIAGNOSIS — E11.621 DIABETIC FOOT ULCER (HCC): Primary | ICD-10-CM

## 2020-11-03 DIAGNOSIS — L97.509 DIABETIC FOOT ULCER (HCC): Primary | ICD-10-CM

## 2020-11-03 PROCEDURE — 97597 DBRDMT OPN WND 1ST 20 CM/<: CPT

## 2020-11-03 NOTE — PROGRESS NOTES
Subjective    Chief Complaint  This information was obtained from the patient  11/3/2020 \"I thought I would have a golf cart for getting around [place] [address] for my work but I get not get one so I walked a lot and the wound appears bigger as a result\". 4/15/2019 He was previously treated in the wound clinic for a wound on his left foot and it had healed. He had transitioned to a shoe when he noticed his wound started opening up again on Thursday and was oozing.  He has neuropathy so he does not feel much 8/3/2020 PT eval for reoccurring plantar heal and mid foot wounds referred from [facility] from Dr. Stephy Jama. Evaluated by APN in wound care clinic and transitioned to PT for PT eval and treatment.         Objective    Wound Assessment(s)  Wound #3c Left, Plantar Pt presents with an increase in wound area, volume and maceration secondary to doing a lot of walking in [employer] as a  during his work assignment with the [employer].  The Pt reported he anticipate he would have a golf cart but that did no Other: Misc / Additional orders  Increase dietary protein intake. Exercise as tolerated. Moisturizer. - legs daily  S/S of infection - monitor for S&S of soft tissue infection such as fever, chills, erythema, increased foul odor and drainage.   Other: Performed By: Clinician Erik Bernabe PT  Debridement: Selective  Photos  Photo  Post Debridement Measurements  Length: (cm) 4.9  Width: (cm) 3.2  Depth: (cm) 0.3      Area: (cm²) 15.68  Percent Debrided: 100  Total Area Debrided: (sq cm) 15.68  Volume:

## 2020-11-05 ENCOUNTER — V-VISIT (OUTPATIENT)
Dept: CARDIOLOGY | Age: 65
End: 2020-11-05
Attending: INTERNAL MEDICINE

## 2020-11-05 DIAGNOSIS — Z79.4 TYPE 2 DIABETES MELLITUS WITHOUT COMPLICATION, WITH LONG-TERM CURRENT USE OF INSULIN (CMD): ICD-10-CM

## 2020-11-05 DIAGNOSIS — E11.9 TYPE 2 DIABETES MELLITUS WITHOUT COMPLICATION, WITH LONG-TERM CURRENT USE OF INSULIN (CMD): ICD-10-CM

## 2020-11-05 DIAGNOSIS — E78.5 DYSLIPIDEMIA (HIGH LDL; LOW HDL): ICD-10-CM

## 2020-11-05 DIAGNOSIS — I10 ESSENTIAL HYPERTENSION: Primary | ICD-10-CM

## 2020-11-05 DIAGNOSIS — I35.0 NONRHEUMATIC AORTIC VALVE STENOSIS: ICD-10-CM

## 2020-11-05 PROCEDURE — 99214 OFFICE O/P EST MOD 30 MIN: CPT | Performed by: INTERNAL MEDICINE

## 2020-11-05 RX ORDER — MAGNESIUM OXIDE 400 MG/1
400 TABLET ORAL EVERY OTHER DAY
COMMUNITY

## 2020-11-05 RX ORDER — LANOLIN ALCOHOL/MO/W.PET/CERES
325 CREAM (GRAM) TOPICAL EVERY OTHER DAY
COMMUNITY

## 2020-11-05 SDOH — HEALTH STABILITY: MENTAL HEALTH: HOW OFTEN DO YOU HAVE 6 OR MORE DRINKS ON ONE OCCASION?: LESS THAN MONTHLY

## 2020-11-05 SDOH — HEALTH STABILITY: MENTAL HEALTH: HOW MANY STANDARD DRINKS CONTAINING ALCOHOL DO YOU HAVE ON A TYPICAL DAY?: 1 OR 2

## 2020-11-05 SDOH — HEALTH STABILITY: PHYSICAL HEALTH: ON AVERAGE, HOW MANY MINUTES DO YOU ENGAGE IN EXERCISE AT THIS LEVEL?: PATIENT DECLINED

## 2020-11-05 SDOH — HEALTH STABILITY: MENTAL HEALTH: HOW OFTEN DO YOU HAVE A DRINK CONTAINING ALCOHOL?: MONTHLY OR LESS

## 2020-11-05 ASSESSMENT — ENCOUNTER SYMPTOMS
ALLERGIC/IMMUNOLOGIC COMMENTS: NO NEW FOOD ALLERGIES
WEIGHT GAIN: 0
WEIGHT LOSS: 0
CHILLS: 0
HEMATOCHEZIA: 0
SUSPICIOUS LESIONS: 0
BRUISES/BLEEDS EASILY: 0
HEMOPTYSIS: 0
FEVER: 0
COUGH: 0

## 2020-11-10 ENCOUNTER — OFFICE VISIT (OUTPATIENT)
Dept: WOUND CARE | Facility: HOSPITAL | Age: 65
End: 2020-11-10
Attending: NURSE PRACTITIONER
Payer: MEDICAID

## 2020-11-10 DIAGNOSIS — L97.509 DIABETIC FOOT ULCER (HCC): ICD-10-CM

## 2020-11-10 DIAGNOSIS — E13.610 CHARCOT'S JOINT DISEASE DUE TO SECONDARY DIABETES (HCC): Primary | ICD-10-CM

## 2020-11-10 DIAGNOSIS — E11.621 DIABETIC FOOT ULCER (HCC): ICD-10-CM

## 2020-11-10 PROCEDURE — 99213 OFFICE O/P EST LOW 20 MIN: CPT

## 2020-11-10 NOTE — PROGRESS NOTES
Subjective    Chief Complaint  This information was obtained from the patient  11/10/2020 \"I have a Tue appointment to see my MD at [facility] for my foot xrays and for follow up and he may put me in a cast again like we talked about\".     Allergies  Coffee ( 4/15/2019 He was previously treated in the wound clinic for a wound on his left foot and it had healed. He had transitioned to a shoe when he noticed his wound started opening up again on Thursday and was oozing.  He has neuropathy so he does not feel much 8/3/2020 PT eval for reoccurring plantar heal and mid foot wounds referred from [facility] [place] from Dr. Kelsy Smith. Evaluated by APN in wound care clinic and transitioned to PT for PT eval and treatment.         Objective    Wound Assessment(s)  Wound #3c Left, Plantar Pt presents with decreased wound depth and reduced wound undermining but the wound area and overall volume are not significant changed.  The wound was cleansed and redressed with specialty dressings and off loading material to reduce mechanical stress to th Applied Primary Wound Dressing. using Aquacel 4x5 hydrofiber (2), Hydrofera Blue Classic 4x4 (1)  Dressing secured with non-allergenic tape/stockinet/wrap. using Kerlix (1), Medipore (1)  Applied Compression device.  using SpandaGrip G (1)  Offloading  Appl

## 2020-11-17 ENCOUNTER — APPOINTMENT (OUTPATIENT)
Dept: WOUND CARE | Facility: HOSPITAL | Age: 65
End: 2020-11-17
Attending: NURSE PRACTITIONER
Payer: MEDICAID

## 2020-11-24 ENCOUNTER — APPOINTMENT (OUTPATIENT)
Dept: WOUND CARE | Facility: HOSPITAL | Age: 65
End: 2020-11-24
Attending: NURSE PRACTITIONER
Payer: MEDICAID

## 2020-12-24 ENCOUNTER — TELEPHONE (OUTPATIENT)
Dept: CARDIOLOGY | Age: 65
End: 2020-12-24

## 2020-12-24 RX ORDER — AMLODIPINE BESYLATE 5 MG/1
5 TABLET ORAL EVERY EVENING
Qty: 30 TABLET | Refills: 6 | Status: SHIPPED | OUTPATIENT
Start: 2020-12-24 | End: 2021-07-13

## 2020-12-24 RX ORDER — IRBESARTAN 300 MG/1
300 TABLET ORAL DAILY
Qty: 30 TABLET | Refills: 6 | Status: CANCELLED | OUTPATIENT
Start: 2020-12-24

## 2020-12-24 RX ORDER — IRBESARTAN 300 MG/1
300 TABLET ORAL DAILY
Qty: 30 TABLET | Refills: 6 | Status: SHIPPED | OUTPATIENT
Start: 2020-12-24 | End: 2021-08-02

## 2021-02-03 ENCOUNTER — HOSPITAL ENCOUNTER (EMERGENCY)
Facility: HOSPITAL | Age: 66
Discharge: HOME | End: 2021-02-03
Attending: EMERGENCY MEDICINE
Payer: MEDICAID

## 2021-02-03 ENCOUNTER — APPOINTMENT (OUTPATIENT)
Dept: MRI IMAGING | Facility: HOSPITAL | Age: 66
End: 2021-02-03
Attending: EMERGENCY MEDICINE
Payer: MEDICAID

## 2021-02-03 ENCOUNTER — APPOINTMENT (OUTPATIENT)
Dept: CT IMAGING | Facility: HOSPITAL | Age: 66
End: 2021-02-03
Attending: EMERGENCY MEDICINE
Payer: MEDICAID

## 2021-02-03 VITALS
HEART RATE: 73 BPM | BODY MASS INDEX: 15 KG/M2 | SYSTOLIC BLOOD PRESSURE: 120 MMHG | RESPIRATION RATE: 14 BRPM | HEIGHT: 78 IN | DIASTOLIC BLOOD PRESSURE: 78 MMHG | TEMPERATURE: 98 F | OXYGEN SATURATION: 97 %

## 2021-02-03 DIAGNOSIS — R11.2 NAUSEA AND VOMITING IN ADULT: ICD-10-CM

## 2021-02-03 DIAGNOSIS — R42 ACUTE ONSET OF SEVERE VERTIGO: Primary | ICD-10-CM

## 2021-02-03 LAB
ALBUMIN SERPL-MCNC: 3.6 G/DL (ref 3.4–5)
ALBUMIN/GLOB SERPL: 0.9 {RATIO} (ref 1–2)
ALP LIVER SERPL-CCNC: 100 U/L
ALT SERPL-CCNC: 58 U/L
ANION GAP SERPL CALC-SCNC: 1 MMOL/L (ref 0–18)
AST SERPL-CCNC: 33 U/L (ref 15–37)
BASOPHILS # BLD AUTO: 0.05 X10(3) UL (ref 0–0.2)
BASOPHILS NFR BLD AUTO: 0.5 %
BILIRUB SERPL-MCNC: 0.5 MG/DL (ref 0.1–2)
BILIRUB UR QL STRIP.AUTO: NEGATIVE
BUN BLD-MCNC: 18 MG/DL (ref 7–18)
BUN/CREAT SERPL: 18.8 (ref 10–20)
CALCIUM BLD-MCNC: 9 MG/DL (ref 8.5–10.1)
CHLORIDE SERPL-SCNC: 107 MMOL/L (ref 98–112)
CLARITY UR REFRACT.AUTO: CLEAR
CO2 SERPL-SCNC: 29 MMOL/L (ref 21–32)
COLOR UR AUTO: YELLOW
CREAT BLD-MCNC: 0.96 MG/DL
DEPRECATED RDW RBC AUTO: 48.6 FL (ref 35.1–46.3)
EOSINOPHIL # BLD AUTO: 0.3 X10(3) UL (ref 0–0.7)
EOSINOPHIL NFR BLD AUTO: 3.1 %
ERYTHROCYTE [DISTWIDTH] IN BLOOD BY AUTOMATED COUNT: 15.1 % (ref 11–15)
GLOBULIN PLAS-MCNC: 4.1 G/DL (ref 2.8–4.4)
GLUCOSE BLD-MCNC: 132 MG/DL (ref 70–99)
GLUCOSE BLD-MCNC: 174 MG/DL (ref 70–99)
GLUCOSE UR STRIP.AUTO-MCNC: NEGATIVE MG/DL
HCT VFR BLD AUTO: 41.3 %
HGB BLD-MCNC: 13.6 G/DL
IMM GRANULOCYTES # BLD AUTO: 0.04 X10(3) UL (ref 0–1)
IMM GRANULOCYTES NFR BLD: 0.4 %
KETONES UR STRIP.AUTO-MCNC: NEGATIVE MG/DL
LEUKOCYTE ESTERASE UR QL STRIP.AUTO: NEGATIVE
LYMPHOCYTES # BLD AUTO: 1.18 X10(3) UL (ref 1–4)
LYMPHOCYTES NFR BLD AUTO: 12.2 %
M PROTEIN MFR SERPL ELPH: 7.7 G/DL (ref 6.4–8.2)
MCH RBC QN AUTO: 28.8 PG (ref 26–34)
MCHC RBC AUTO-ENTMCNC: 32.9 G/DL (ref 31–37)
MCV RBC AUTO: 87.5 FL
MONOCYTES # BLD AUTO: 0.65 X10(3) UL (ref 0.1–1)
MONOCYTES NFR BLD AUTO: 6.7 %
NEUTROPHILS # BLD AUTO: 7.47 X10 (3) UL (ref 1.5–7.7)
NEUTROPHILS # BLD AUTO: 7.47 X10(3) UL (ref 1.5–7.7)
NEUTROPHILS NFR BLD AUTO: 77.1 %
NITRITE UR QL STRIP.AUTO: NEGATIVE
OSMOLALITY SERPL CALC.SUM OF ELEC: 290 MOSM/KG (ref 275–295)
PH UR STRIP.AUTO: 5 [PH] (ref 4.5–8)
PLATELET # BLD AUTO: 205 10(3)UL (ref 150–450)
POTASSIUM SERPL-SCNC: 4.6 MMOL/L (ref 3.5–5.1)
PROT UR STRIP.AUTO-MCNC: NEGATIVE MG/DL
RBC # BLD AUTO: 4.72 X10(6)UL
RBC UR QL AUTO: NEGATIVE
SODIUM SERPL-SCNC: 137 MMOL/L (ref 136–145)
SP GR UR STRIP.AUTO: 1.01 (ref 1–1.03)
UROBILINOGEN UR STRIP.AUTO-MCNC: <2 MG/DL
WBC # BLD AUTO: 9.7 X10(3) UL (ref 4–11)

## 2021-02-03 PROCEDURE — 85025 COMPLETE CBC W/AUTO DIFF WBC: CPT | Performed by: EMERGENCY MEDICINE

## 2021-02-03 PROCEDURE — 81001 URINALYSIS AUTO W/SCOPE: CPT | Performed by: EMERGENCY MEDICINE

## 2021-02-03 PROCEDURE — 99285 EMERGENCY DEPT VISIT HI MDM: CPT

## 2021-02-03 PROCEDURE — 96374 THER/PROPH/DIAG INJ IV PUSH: CPT

## 2021-02-03 PROCEDURE — 70450 CT HEAD/BRAIN W/O DYE: CPT | Performed by: EMERGENCY MEDICINE

## 2021-02-03 PROCEDURE — 93005 ELECTROCARDIOGRAM TRACING: CPT

## 2021-02-03 PROCEDURE — 96375 TX/PRO/DX INJ NEW DRUG ADDON: CPT

## 2021-02-03 PROCEDURE — 82962 GLUCOSE BLOOD TEST: CPT

## 2021-02-03 PROCEDURE — 93010 ELECTROCARDIOGRAM REPORT: CPT

## 2021-02-03 PROCEDURE — 70553 MRI BRAIN STEM W/O & W/DYE: CPT | Performed by: EMERGENCY MEDICINE

## 2021-02-03 PROCEDURE — 96361 HYDRATE IV INFUSION ADD-ON: CPT

## 2021-02-03 PROCEDURE — 80053 COMPREHEN METABOLIC PANEL: CPT | Performed by: EMERGENCY MEDICINE

## 2021-02-03 PROCEDURE — A9575 INJ GADOTERATE MEGLUMI 0.1ML: HCPCS | Performed by: EMERGENCY MEDICINE

## 2021-02-03 RX ORDER — METOCLOPRAMIDE 10 MG/1
10 TABLET ORAL 3 TIMES DAILY PRN
Qty: 20 TABLET | Refills: 0 | Status: SHIPPED | OUTPATIENT
Start: 2021-02-03 | End: 2021-09-29 | Stop reason: ALTCHOICE

## 2021-02-03 RX ORDER — DIPHENHYDRAMINE HYDROCHLORIDE 50 MG/ML
50 INJECTION INTRAMUSCULAR; INTRAVENOUS ONCE
Status: COMPLETED | OUTPATIENT
Start: 2021-02-03 | End: 2021-02-03

## 2021-02-03 RX ORDER — METOCLOPRAMIDE HYDROCHLORIDE 5 MG/ML
10 INJECTION INTRAMUSCULAR; INTRAVENOUS ONCE
Status: COMPLETED | OUTPATIENT
Start: 2021-02-03 | End: 2021-02-03

## 2021-02-03 RX ORDER — LORAZEPAM 2 MG/ML
1 INJECTION INTRAMUSCULAR ONCE
Status: COMPLETED | OUTPATIENT
Start: 2021-02-03 | End: 2021-02-03

## 2021-02-03 RX ORDER — MECLIZINE HYDROCHLORIDE 25 MG/1
25 TABLET ORAL 3 TIMES DAILY PRN
Qty: 20 TABLET | Refills: 0 | Status: SHIPPED | OUTPATIENT
Start: 2021-02-03 | End: 2021-04-12 | Stop reason: ALTCHOICE

## 2021-02-03 NOTE — ED PROVIDER NOTES
Patient Seen in: [facility] Emergency Department      History   Patient presents with:  Dizziness  Nausea/Vomiting/Diarrhea    Stated Complaint: dizziness, vomiting    HPI/Subjective:   HPI    67-year-old male presents to the emerge department with unspecified hyperlipidemia    • RLS (restless legs syndrome)    • Type II or unspecified type diabetes mellitus without mention of complication, not stated as uncontrolled               Past Surgical History:   Procedure Laterality Date   • HEMORRHOIDECTOM Normal breath sounds. No stridor. Abdominal:      General: Bowel sounds are normal.      Palpations: Abdomen is soft. Musculoskeletal: Normal range of motion. Lymphadenopathy:      Cervical: No cervical adenopathy.    Skin:     General: Skin is warm a First-degree AV block no acute ST segment elevation              Ct Brain Or Head (23218)    Result Date: 2/3/2021  PROCEDURE:  CT BRAIN OR HEAD (41964)  COMPARISON:  [facility] , CT, CT BRAIN OR HEAD (61027), 4/22/2020, 5:45 AM.  INDICATIONS:  dizziness, vomi multi-planar T1, T2-weighted images with FLAIR sequences and diffusion weighted images without and with infusion.   PATIENT STATED HISTORY:(As transcribed by Technologist)  Patient woke up roday at 0830 feeling dizzy and nauseated   CONTRAST USED:  20 mL of MRI which showed no evidence of CVA. I suspect with the sudden onset with the improvement as the day has gone on as well as with medications that he is most likely a peripheral vertigo rather than central vertigo. Patient is feeling significantly better.

## 2021-02-04 LAB
ATRIAL RATE: 54 BPM
P AXIS: 60 DEGREES
P-R INTERVAL: 224 MS
Q-T INTERVAL: 440 MS
QRS DURATION: 136 MS
QTC CALCULATION (BEZET): 417 MS
R AXIS: -25 DEGREES
T AXIS: 72 DEGREES
VENTRICULAR RATE: 54 BPM

## 2021-03-25 ENCOUNTER — IMMUNIZATION (OUTPATIENT)
Dept: LAB | Age: 66
End: 2021-03-25

## 2021-03-25 DIAGNOSIS — Z23 NEED FOR VACCINATION: Primary | ICD-10-CM

## 2021-03-25 PROCEDURE — 91300 COVID 19 PFIZER-BIONTECH: CPT

## 2021-03-25 PROCEDURE — 0001A COVID 19 PFIZER-BIONTECH: CPT

## 2021-04-12 PROBLEM — I35.0 NONRHEUMATIC AORTIC VALVE STENOSIS: Status: ACTIVE | Noted: 2020-11-05

## 2021-04-28 PROBLEM — R77.8 ELEVATED TROPONIN: Status: RESOLVED | Noted: 2020-03-18 | Resolved: 2021-04-28

## 2021-04-28 PROBLEM — D63.8 ANEMIA IN OTHER CHRONIC DISEASES CLASSIFIED ELSEWHERE: Status: ACTIVE | Noted: 2021-04-28

## 2021-04-28 PROBLEM — R79.89 ELEVATED TROPONIN: Status: RESOLVED | Noted: 2020-03-18 | Resolved: 2021-04-28

## 2021-04-30 ENCOUNTER — APPOINTMENT (OUTPATIENT)
Dept: ULTRASOUND IMAGING | Facility: HOSPITAL | Age: 66
End: 2021-04-30
Attending: EMERGENCY MEDICINE
Payer: MEDICAID

## 2021-04-30 ENCOUNTER — APPOINTMENT (OUTPATIENT)
Dept: GENERAL RADIOLOGY | Facility: HOSPITAL | Age: 66
End: 2021-04-30
Attending: EMERGENCY MEDICINE
Payer: MEDICAID

## 2021-04-30 ENCOUNTER — HOSPITAL ENCOUNTER (EMERGENCY)
Facility: HOSPITAL | Age: 66
Discharge: HOME | End: 2021-04-30
Attending: EMERGENCY MEDICINE
Payer: MEDICAID

## 2021-04-30 ENCOUNTER — APPOINTMENT (OUTPATIENT)
Dept: CT IMAGING | Facility: HOSPITAL | Age: 66
End: 2021-04-30
Attending: EMERGENCY MEDICINE
Payer: MEDICAID

## 2021-04-30 VITALS
BODY MASS INDEX: 34.82 KG/M2 | TEMPERATURE: 98 F | HEIGHT: 75 IN | DIASTOLIC BLOOD PRESSURE: 43 MMHG | OXYGEN SATURATION: 99 % | WEIGHT: 280 LBS | RESPIRATION RATE: 11 BRPM | SYSTOLIC BLOOD PRESSURE: 142 MMHG | HEART RATE: 46 BPM

## 2021-04-30 DIAGNOSIS — R06.00 DYSPNEA ON EXERTION: Primary | ICD-10-CM

## 2021-04-30 PROCEDURE — 99285 EMERGENCY DEPT VISIT HI MDM: CPT

## 2021-04-30 PROCEDURE — 93010 ELECTROCARDIOGRAM REPORT: CPT

## 2021-04-30 PROCEDURE — 93005 ELECTROCARDIOGRAM TRACING: CPT

## 2021-04-30 PROCEDURE — 36415 COLL VENOUS BLD VENIPUNCTURE: CPT

## 2021-04-30 PROCEDURE — 71045 X-RAY EXAM CHEST 1 VIEW: CPT | Performed by: EMERGENCY MEDICINE

## 2021-04-30 PROCEDURE — 85025 COMPLETE CBC W/AUTO DIFF WBC: CPT | Performed by: EMERGENCY MEDICINE

## 2021-04-30 PROCEDURE — 83880 ASSAY OF NATRIURETIC PEPTIDE: CPT | Performed by: EMERGENCY MEDICINE

## 2021-04-30 PROCEDURE — 71260 CT THORAX DX C+: CPT | Performed by: EMERGENCY MEDICINE

## 2021-04-30 PROCEDURE — 80053 COMPREHEN METABOLIC PANEL: CPT | Performed by: EMERGENCY MEDICINE

## 2021-04-30 PROCEDURE — 85730 THROMBOPLASTIN TIME PARTIAL: CPT | Performed by: EMERGENCY MEDICINE

## 2021-04-30 PROCEDURE — 84484 ASSAY OF TROPONIN QUANT: CPT | Performed by: EMERGENCY MEDICINE

## 2021-04-30 PROCEDURE — 93970 EXTREMITY STUDY: CPT | Performed by: EMERGENCY MEDICINE

## 2021-04-30 PROCEDURE — 85610 PROTHROMBIN TIME: CPT | Performed by: EMERGENCY MEDICINE

## 2021-04-30 NOTE — ED INITIAL ASSESSMENT (HPI)
PT c/o shortness of breath for past week or so. PT had blood drawn the other day, with an elevated d-dimer.

## 2021-04-30 NOTE — ED PROVIDER NOTES
Patient Seen in: [facility] Emergency Department      History   Patient presents with:  Difficulty Breathing    Stated Complaint: shortness of breath with 97% on room air, +Dimer     HPI/Subjective:   HPI    67-year-old male complaint shortness of b uncontrolled               Past Surgical History:   Procedure Laterality Date   • HEMORRHOIDECTOMY     • OPEN RX PATELLA FX     • OPEN RX PERIARTIC FX/DISLOC ELBOW     • OTHER SURGICAL HISTORY  2011, 2012    bilateral foot surgery for Charcot joint   • TON INR 1.25 (*)     All other components within normal limits   PTT, ACTIVATED - Abnormal; Notable for the following components:    PTT 44.0 (*)     All other components within normal limits   CBC W/ DIFFERENTIAL - Abnormal; Notable for the following compo Dictated by (CST): Vidhya Muro MD on 4/30/2021 at 2:57 PM     Finalized by (CST): Vidhya Muro MD on 4/30/2021 at 2:57 PM       CT CHEST PE AORTA (IV ONLY) (CPT=71260)    Result Date: 4/30/2021  CONCLUSION:  No evidence of pulmonary embolism.   Scatter

## 2021-05-01 NOTE — ED PROVIDER NOTES
Patient is a 55-year-old male who had presented to the emergency department for evaluation of shortness of breath. Patient initially seen by Dr. Sola Tripp, please refer to their documentation for additional details.     Patient's care was turned over to me at

## 2021-05-10 ENCOUNTER — IMMUNIZATION (OUTPATIENT)
Dept: LAB | Age: 66
End: 2021-05-10
Attending: HOSPITALIST

## 2021-05-10 DIAGNOSIS — Z23 NEED FOR VACCINATION: Primary | ICD-10-CM

## 2021-05-10 PROCEDURE — 0001A COVID 19 PFIZER-BIONTECH: CPT | Performed by: HOSPITALIST

## 2021-05-10 PROCEDURE — 91300 COVID 19 PFIZER-BIONTECH: CPT | Performed by: HOSPITALIST

## 2021-05-21 ENCOUNTER — TELEPHONE (OUTPATIENT)
Dept: CARDIOLOGY | Age: 66
End: 2021-05-21

## 2021-06-03 ENCOUNTER — TELEPHONE (OUTPATIENT)
Dept: CARDIOLOGY | Age: 66
End: 2021-06-03

## 2021-07-13 RX ORDER — AMLODIPINE BESYLATE 5 MG/1
5 TABLET ORAL EVERY EVENING
Qty: 90 TABLET | Refills: 1 | Status: SHIPPED | OUTPATIENT
Start: 2021-07-13 | End: 2022-01-25

## 2021-08-02 RX ORDER — IRBESARTAN 300 MG/1
300 TABLET ORAL DAILY
Qty: 30 TABLET | Refills: 6 | Status: SHIPPED | OUTPATIENT
Start: 2021-08-02 | End: 2022-03-01

## 2021-09-10 ENCOUNTER — TELEPHONE (OUTPATIENT)
Dept: CARDIOLOGY | Age: 66
End: 2021-09-10

## 2021-09-10 DIAGNOSIS — I10 ESSENTIAL HYPERTENSION: ICD-10-CM

## 2021-09-10 DIAGNOSIS — I35.0 NONRHEUMATIC AORTIC VALVE STENOSIS: ICD-10-CM

## 2021-09-10 DIAGNOSIS — E78.5 DYSLIPIDEMIA (HIGH LDL; LOW HDL): Primary | ICD-10-CM

## 2021-10-21 ENCOUNTER — APPOINTMENT (OUTPATIENT)
Dept: CARDIOLOGY | Age: 66
End: 2021-10-21

## 2021-10-22 ENCOUNTER — APPOINTMENT (OUTPATIENT)
Dept: CARDIOLOGY | Age: 66
End: 2021-10-22

## 2021-11-01 ENCOUNTER — APPOINTMENT (OUTPATIENT)
Dept: CARDIOLOGY | Age: 66
End: 2021-11-01

## 2021-11-15 ENCOUNTER — APPOINTMENT (OUTPATIENT)
Dept: CARDIOLOGY | Age: 66
End: 2021-11-15

## 2021-11-24 ENCOUNTER — HOSPITAL ENCOUNTER (OUTPATIENT)
Dept: CV DIAGNOSTICS | Facility: HOSPITAL | Age: 66
Discharge: HOME | End: 2021-11-24
Payer: MEDICAID

## 2021-11-24 DIAGNOSIS — I35.0 NONRHEUMATIC AORTIC (VALVE) STENOSIS: ICD-10-CM

## 2021-11-24 PROCEDURE — 93306 TTE W/DOPPLER COMPLETE: CPT

## 2021-11-24 PROCEDURE — 93306 TTE W/DOPPLER COMPLETE: CPT | Performed by: INTERNAL MEDICINE

## 2021-11-29 ENCOUNTER — OFFICE VISIT (OUTPATIENT)
Dept: CARDIOLOGY | Age: 66
End: 2021-11-29

## 2021-11-29 VITALS
SYSTOLIC BLOOD PRESSURE: 140 MMHG | HEIGHT: 75 IN | BODY MASS INDEX: 34.19 KG/M2 | DIASTOLIC BLOOD PRESSURE: 70 MMHG | WEIGHT: 275 LBS

## 2021-11-29 DIAGNOSIS — I35.0 NONRHEUMATIC AORTIC VALVE STENOSIS: ICD-10-CM

## 2021-11-29 DIAGNOSIS — Z79.4 TYPE 2 DIABETES MELLITUS WITHOUT COMPLICATION, WITH LONG-TERM CURRENT USE OF INSULIN (CMD): ICD-10-CM

## 2021-11-29 DIAGNOSIS — E11.9 TYPE 2 DIABETES MELLITUS WITHOUT COMPLICATION, WITH LONG-TERM CURRENT USE OF INSULIN (CMD): ICD-10-CM

## 2021-11-29 DIAGNOSIS — E78.5 DYSLIPIDEMIA (HIGH LDL; LOW HDL): Primary | ICD-10-CM

## 2021-11-29 DIAGNOSIS — I10 ESSENTIAL HYPERTENSION: ICD-10-CM

## 2021-11-29 PROCEDURE — 3077F SYST BP >= 140 MM HG: CPT | Performed by: INTERNAL MEDICINE

## 2021-11-29 PROCEDURE — 3078F DIAST BP <80 MM HG: CPT | Performed by: INTERNAL MEDICINE

## 2021-11-29 PROCEDURE — 99214 OFFICE O/P EST MOD 30 MIN: CPT | Performed by: INTERNAL MEDICINE

## 2021-11-29 ASSESSMENT — PATIENT HEALTH QUESTIONNAIRE - PHQ9
2. FEELING DOWN, DEPRESSED OR HOPELESS: NOT AT ALL
SUM OF ALL RESPONSES TO PHQ9 QUESTIONS 1 AND 2: 0
SUM OF ALL RESPONSES TO PHQ9 QUESTIONS 1 AND 2: 0
CLINICAL INTERPRETATION OF PHQ2 SCORE: NO FURTHER SCREENING NEEDED
1. LITTLE INTEREST OR PLEASURE IN DOING THINGS: NOT AT ALL

## 2021-11-29 ASSESSMENT — ENCOUNTER SYMPTOMS
WEIGHT LOSS: 0
WEIGHT GAIN: 0
SUSPICIOUS LESIONS: 0
HEMATOCHEZIA: 0
CHILLS: 0
FEVER: 0
ALLERGIC/IMMUNOLOGIC COMMENTS: NO NEW FOOD ALLERGIES
COUGH: 0
HEMOPTYSIS: 0
BRUISES/BLEEDS EASILY: 0

## 2022-01-11 ENCOUNTER — TELEPHONE (OUTPATIENT)
Dept: CARDIOLOGY | Age: 67
End: 2022-01-11

## 2022-01-25 DIAGNOSIS — Z01.812 PRE-PROCEDURAL LABORATORY EXAMINATION: Primary | ICD-10-CM

## 2022-01-25 RX ORDER — AMLODIPINE BESYLATE 5 MG/1
5 TABLET ORAL EVERY EVENING
Qty: 90 TABLET | Refills: 1 | Status: SHIPPED | OUTPATIENT
Start: 2022-01-25 | End: 2022-05-23

## 2022-01-30 ENCOUNTER — LAB SERVICES (OUTPATIENT)
Dept: LAB | Age: 67
End: 2022-01-30

## 2022-01-30 DIAGNOSIS — Z01.812 PRE-PROCEDURAL LABORATORY EXAMINATION: ICD-10-CM

## 2022-01-30 LAB
SARS-COV-2 RNA RESP QL NAA+PROBE: NOT DETECTED
SERVICE CMNT-IMP: NORMAL
SERVICE CMNT-IMP: NORMAL

## 2022-01-30 PROCEDURE — U0003 INFECTIOUS AGENT DETECTION BY NUCLEIC ACID (DNA OR RNA); SEVERE ACUTE RESPIRATORY SYNDROME CORONAVIRUS 2 (SARS-COV-2) (CORONAVIRUS DISEASE [COVID-19]), AMPLIFIED PROBE TECHNIQUE, MAKING USE OF HIGH THROUGHPUT TECHNOLOGIES AS DESCRIBED BY CMS-2020-01-R: HCPCS | Performed by: PSYCHIATRY & NEUROLOGY

## 2022-01-30 PROCEDURE — U0005 INFEC AGEN DETEC AMPLI PROBE: HCPCS | Performed by: PSYCHIATRY & NEUROLOGY

## 2022-01-31 VITALS — WEIGHT: 280 LBS | HEIGHT: 74 IN | BODY MASS INDEX: 35.94 KG/M2

## 2022-02-02 ENCOUNTER — HOSPITAL ENCOUNTER (OUTPATIENT)
Dept: GASTROENTEROLOGY | Age: 67
Discharge: HOME | End: 2022-02-02
Attending: INTERNAL MEDICINE

## 2022-03-01 RX ORDER — IRBESARTAN 300 MG/1
300 TABLET ORAL DAILY
Qty: 30 TABLET | Refills: 11 | Status: SHIPPED | OUTPATIENT
Start: 2022-03-01 | End: 2022-11-28 | Stop reason: SDUPTHER

## 2022-03-01 RX ORDER — IRBESARTAN 300 MG/1
300 TABLET ORAL DAILY
Qty: 30 TABLET | Refills: 6 | OUTPATIENT
Start: 2022-03-01

## 2022-03-07 DIAGNOSIS — Z11.52 ENCOUNTER FOR PREPROCEDURE SCREENING LABORATORY TESTING FOR COVID-19: Primary | ICD-10-CM

## 2022-03-07 DIAGNOSIS — Z01.812 ENCOUNTER FOR PREPROCEDURE SCREENING LABORATORY TESTING FOR COVID-19: Primary | ICD-10-CM

## 2022-03-27 ENCOUNTER — LAB SERVICES (OUTPATIENT)
Dept: LAB | Age: 67
End: 2022-03-27

## 2022-03-27 DIAGNOSIS — Z11.52 ENCOUNTER FOR PREPROCEDURE SCREENING LABORATORY TESTING FOR COVID-19: ICD-10-CM

## 2022-03-27 DIAGNOSIS — Z01.812 ENCOUNTER FOR PREPROCEDURE SCREENING LABORATORY TESTING FOR COVID-19: ICD-10-CM

## 2022-03-27 LAB
FLUAV RNA RESP QL NAA+PROBE: NOT DETECTED
FLUBV RNA RESP QL NAA+PROBE: NOT DETECTED
SARS-COV-2 RNA RESP QL NAA+PROBE: NOT DETECTED
SERVICE CMNT-IMP: NORMAL
SERVICE CMNT-IMP: NORMAL

## 2022-03-27 PROCEDURE — U0003 INFECTIOUS AGENT DETECTION BY NUCLEIC ACID (DNA OR RNA); SEVERE ACUTE RESPIRATORY SYNDROME CORONAVIRUS 2 (SARS-COV-2) (CORONAVIRUS DISEASE [COVID-19]), AMPLIFIED PROBE TECHNIQUE, MAKING USE OF HIGH THROUGHPUT TECHNOLOGIES AS DESCRIBED BY CMS-2020-01-R: HCPCS | Performed by: INTERNAL MEDICINE

## 2022-03-27 PROCEDURE — U0005 INFEC AGEN DETEC AMPLI PROBE: HCPCS | Performed by: INTERNAL MEDICINE

## 2022-03-30 ENCOUNTER — HOSPITAL ENCOUNTER (OUTPATIENT)
Dept: GASTROENTEROLOGY | Age: 67
End: 2022-03-30
Attending: INTERNAL MEDICINE

## 2022-05-23 RX ORDER — AMLODIPINE BESYLATE 5 MG/1
5 TABLET ORAL EVERY EVENING
Qty: 90 TABLET | Refills: 1 | Status: SHIPPED | OUTPATIENT
Start: 2022-05-23 | End: 2022-11-28 | Stop reason: SDUPTHER

## 2022-08-04 ENCOUNTER — APPOINTMENT (OUTPATIENT)
Dept: CV DIAGNOSTICS | Facility: HOSPITAL | Age: 67
End: 2022-08-04
Attending: INTERNAL MEDICINE
Payer: MEDICAID

## 2022-08-04 ENCOUNTER — APPOINTMENT (OUTPATIENT)
Dept: CT IMAGING | Facility: HOSPITAL | Age: 67
End: 2022-08-04
Attending: EMERGENCY MEDICINE
Payer: MEDICAID

## 2022-08-04 ENCOUNTER — HOSPITAL ENCOUNTER (OUTPATIENT)
Facility: HOSPITAL | Age: 67
Setting detail: OBSERVATION
Discharge: HOME | End: 2022-08-04
Attending: EMERGENCY MEDICINE | Admitting: INTERNAL MEDICINE
Payer: MEDICAID

## 2022-08-04 ENCOUNTER — APPOINTMENT (OUTPATIENT)
Dept: GENERAL RADIOLOGY | Facility: HOSPITAL | Age: 67
End: 2022-08-04
Attending: EMERGENCY MEDICINE
Payer: MEDICAID

## 2022-08-04 VITALS
HEART RATE: 50 BPM | RESPIRATION RATE: 16 BRPM | SYSTOLIC BLOOD PRESSURE: 116 MMHG | DIASTOLIC BLOOD PRESSURE: 48 MMHG | OXYGEN SATURATION: 98 % | TEMPERATURE: 98 F

## 2022-08-04 DIAGNOSIS — R10.13 EPIGASTRIC PAIN: ICD-10-CM

## 2022-08-04 DIAGNOSIS — R42 DIZZINESS: ICD-10-CM

## 2022-08-04 DIAGNOSIS — R77.8 ELEVATED TROPONIN: Primary | ICD-10-CM

## 2022-08-04 PROBLEM — R79.89 ELEVATED TROPONIN: Status: ACTIVE | Noted: 2022-08-04

## 2022-08-04 LAB
ALBUMIN SERPL-MCNC: 3.7 G/DL (ref 3.4–5)
ALBUMIN/GLOB SERPL: 0.8 {RATIO} (ref 1–2)
ALP LIVER SERPL-CCNC: 89 U/L
ALT SERPL-CCNC: 46 U/L
ANION GAP SERPL CALC-SCNC: 4 MMOL/L (ref 0–18)
AST SERPL-CCNC: 27 U/L (ref 15–37)
ATRIAL RATE: 52 BPM
BASOPHILS # BLD AUTO: 0.03 X10(3) UL (ref 0–0.2)
BASOPHILS NFR BLD AUTO: 0.3 %
BILIRUB SERPL-MCNC: 0.5 MG/DL (ref 0.1–2)
BILIRUB UR QL STRIP.AUTO: NEGATIVE
BUN BLD-MCNC: 17 MG/DL (ref 7–18)
CALCIUM BLD-MCNC: 9.4 MG/DL (ref 8.5–10.1)
CHLORIDE SERPL-SCNC: 103 MMOL/L (ref 98–112)
CHOLEST SERPL-MCNC: 117 MG/DL (ref ?–200)
CLARITY UR REFRACT.AUTO: CLEAR
CO2 SERPL-SCNC: 28 MMOL/L (ref 21–32)
COLOR UR AUTO: YELLOW
CREAT BLD-MCNC: 0.95 MG/DL
EOSINOPHIL # BLD AUTO: 0.13 X10(3) UL (ref 0–0.7)
EOSINOPHIL NFR BLD AUTO: 1.4 %
ERYTHROCYTE [DISTWIDTH] IN BLOOD BY AUTOMATED COUNT: 14.6 %
EST. AVERAGE GLUCOSE BLD GHB EST-MCNC: 143 MG/DL (ref 68–126)
GFR SERPLBLD BASED ON 1.73 SQ M-ARVRAT: 88 ML/MIN/1.73M2 (ref 60–?)
GLOBULIN PLAS-MCNC: 4.6 G/DL (ref 2.8–4.4)
GLUCOSE BLD-MCNC: 138 MG/DL (ref 70–99)
GLUCOSE BLD-MCNC: 161 MG/DL (ref 70–99)
GLUCOSE BLD-MCNC: 170 MG/DL (ref 70–99)
GLUCOSE BLD-MCNC: 170 MG/DL (ref 70–99)
GLUCOSE BLD-MCNC: 181 MG/DL (ref 70–99)
GLUCOSE UR STRIP.AUTO-MCNC: NEGATIVE MG/DL
HBA1C MFR BLD: 6.6 % (ref ?–5.7)
HCT VFR BLD AUTO: 43.6 %
HDLC SERPL-MCNC: 46 MG/DL (ref 40–59)
HGB BLD-MCNC: 14.2 G/DL
HYALINE CASTS #/AREA URNS AUTO: PRESENT /LPF
IMM GRANULOCYTES # BLD AUTO: 0.05 X10(3) UL (ref 0–1)
IMM GRANULOCYTES NFR BLD: 0.5 %
KETONES UR STRIP.AUTO-MCNC: NEGATIVE MG/DL
LDLC SERPL CALC-MCNC: 53 MG/DL (ref ?–100)
LEUKOCYTE ESTERASE UR QL STRIP.AUTO: NEGATIVE
LIPASE SERPL-CCNC: 96 U/L (ref 73–393)
LYMPHOCYTES # BLD AUTO: 1.05 X10(3) UL (ref 1–4)
LYMPHOCYTES NFR BLD AUTO: 11.4 %
MCH RBC QN AUTO: 28 PG (ref 26–34)
MCHC RBC AUTO-ENTMCNC: 32.6 G/DL (ref 31–37)
MCV RBC AUTO: 86 FL
MONOCYTES # BLD AUTO: 0.44 X10(3) UL (ref 0.1–1)
MONOCYTES NFR BLD AUTO: 4.8 %
NEUTROPHILS # BLD AUTO: 7.54 X10 (3) UL (ref 1.5–7.7)
NEUTROPHILS # BLD AUTO: 7.54 X10(3) UL (ref 1.5–7.7)
NEUTROPHILS NFR BLD AUTO: 81.6 %
NITRITE UR QL STRIP.AUTO: NEGATIVE
NONHDLC SERPL-MCNC: 71 MG/DL (ref ?–130)
OSMOLALITY SERPL CALC.SUM OF ELEC: 286 MOSM/KG (ref 275–295)
P AXIS: 39 DEGREES
P-R INTERVAL: 234 MS
PH UR STRIP.AUTO: 6.5 [PH] (ref 5–8)
PLATELET # BLD AUTO: 195 10(3)UL (ref 150–450)
POTASSIUM SERPL-SCNC: 4.9 MMOL/L (ref 3.5–5.1)
PROT SERPL-MCNC: 8.3 G/DL (ref 6.4–8.2)
Q-T INTERVAL: 452 MS
QRS DURATION: 148 MS
QTC CALCULATION (BEZET): 420 MS
R AXIS: -33 DEGREES
RBC # BLD AUTO: 5.07 X10(6)UL
RBC UR QL AUTO: NEGATIVE
SARS-COV-2 RNA RESP QL NAA+PROBE: NOT DETECTED
SODIUM SERPL-SCNC: 135 MMOL/L (ref 136–145)
SP GR UR STRIP.AUTO: 1.02 (ref 1–1.03)
T AXIS: 60 DEGREES
TRIGL SERPL-MCNC: 95 MG/DL (ref 30–149)
TROPONIN I HIGH SENSITIVITY: 117 NG/L
TROPONIN I HIGH SENSITIVITY: 121 NG/L
UROBILINOGEN UR STRIP.AUTO-MCNC: 0.2 MG/DL
VENTRICULAR RATE: 52 BPM
VLDLC SERPL CALC-MCNC: 14 MG/DL (ref 0–30)
WBC # BLD AUTO: 9.2 X10(3) UL (ref 4–11)

## 2022-08-04 PROCEDURE — 74177 CT ABD & PELVIS W/CONTRAST: CPT | Performed by: EMERGENCY MEDICINE

## 2022-08-04 PROCEDURE — 99285 EMERGENCY DEPT VISIT HI MDM: CPT

## 2022-08-04 PROCEDURE — 93306 TTE W/DOPPLER COMPLETE: CPT | Performed by: INTERNAL MEDICINE

## 2022-08-04 PROCEDURE — 84484 ASSAY OF TROPONIN QUANT: CPT | Performed by: EMERGENCY MEDICINE

## 2022-08-04 PROCEDURE — 83690 ASSAY OF LIPASE: CPT | Performed by: EMERGENCY MEDICINE

## 2022-08-04 PROCEDURE — 85025 COMPLETE CBC W/AUTO DIFF WBC: CPT | Performed by: EMERGENCY MEDICINE

## 2022-08-04 PROCEDURE — 93010 ELECTROCARDIOGRAM REPORT: CPT

## 2022-08-04 PROCEDURE — 80061 LIPID PANEL: CPT | Performed by: EMERGENCY MEDICINE

## 2022-08-04 PROCEDURE — 71045 X-RAY EXAM CHEST 1 VIEW: CPT | Performed by: EMERGENCY MEDICINE

## 2022-08-04 PROCEDURE — 82962 GLUCOSE BLOOD TEST: CPT

## 2022-08-04 PROCEDURE — 96361 HYDRATE IV INFUSION ADD-ON: CPT

## 2022-08-04 PROCEDURE — 81001 URINALYSIS AUTO W/SCOPE: CPT | Performed by: EMERGENCY MEDICINE

## 2022-08-04 PROCEDURE — 84484 ASSAY OF TROPONIN QUANT: CPT | Performed by: INTERNAL MEDICINE

## 2022-08-04 PROCEDURE — 83036 HEMOGLOBIN GLYCOSYLATED A1C: CPT | Performed by: INTERNAL MEDICINE

## 2022-08-04 PROCEDURE — 96374 THER/PROPH/DIAG INJ IV PUSH: CPT

## 2022-08-04 PROCEDURE — 93005 ELECTROCARDIOGRAM TRACING: CPT

## 2022-08-04 PROCEDURE — 96376 TX/PRO/DX INJ SAME DRUG ADON: CPT

## 2022-08-04 PROCEDURE — 70450 CT HEAD/BRAIN W/O DYE: CPT | Performed by: EMERGENCY MEDICINE

## 2022-08-04 PROCEDURE — 80053 COMPREHEN METABOLIC PANEL: CPT | Performed by: EMERGENCY MEDICINE

## 2022-08-04 RX ORDER — ONDANSETRON 2 MG/ML
4 INJECTION INTRAMUSCULAR; INTRAVENOUS EVERY 6 HOURS PRN
Status: DISCONTINUED | OUTPATIENT
Start: 2022-08-04 | End: 2022-08-04

## 2022-08-04 RX ORDER — DEXTROSE MONOHYDRATE 25 G/50ML
50 INJECTION, SOLUTION INTRAVENOUS
Status: DISCONTINUED | OUTPATIENT
Start: 2022-08-04 | End: 2022-08-04

## 2022-08-04 RX ORDER — MECLIZINE HYDROCHLORIDE 25 MG/1
25 TABLET ORAL 3 TIMES DAILY PRN
Qty: 20 TABLET | Refills: 0 | Status: SHIPPED | OUTPATIENT
Start: 2022-08-04

## 2022-08-04 RX ORDER — SODIUM CHLORIDE 9 MG/ML
INJECTION, SOLUTION INTRAVENOUS CONTINUOUS
Status: DISCONTINUED | OUTPATIENT
Start: 2022-08-04 | End: 2022-08-04

## 2022-08-04 RX ORDER — ROSUVASTATIN CALCIUM 20 MG/1
20 TABLET, COATED ORAL NIGHTLY
Status: DISCONTINUED | OUTPATIENT
Start: 2022-08-04 | End: 2022-08-04

## 2022-08-04 RX ORDER — MECLIZINE HYDROCHLORIDE 25 MG/1
25 TABLET ORAL ONCE
Status: COMPLETED | OUTPATIENT
Start: 2022-08-04 | End: 2022-08-04

## 2022-08-04 RX ORDER — METOCLOPRAMIDE HYDROCHLORIDE 5 MG/ML
10 INJECTION INTRAMUSCULAR; INTRAVENOUS EVERY 8 HOURS PRN
Status: DISCONTINUED | OUTPATIENT
Start: 2022-08-04 | End: 2022-08-04

## 2022-08-04 RX ORDER — DOCUSATE SODIUM 100 MG/1
100 CAPSULE, LIQUID FILLED ORAL EVERY MORNING
Status: DISCONTINUED | OUTPATIENT
Start: 2022-08-04 | End: 2022-08-04

## 2022-08-04 RX ORDER — ASPIRIN 325 MG
325 TABLET ORAL DAILY
Status: DISCONTINUED | OUTPATIENT
Start: 2022-08-04 | End: 2022-08-04

## 2022-08-04 RX ORDER — MAGNESIUM HYDROXIDE/ALUMINUM HYDROXICE/SIMETHICONE 120; 1200; 1200 MG/30ML; MG/30ML; MG/30ML
30 SUSPENSION ORAL 4 TIMES DAILY PRN
Status: DISCONTINUED | OUTPATIENT
Start: 2022-08-04 | End: 2022-08-04

## 2022-08-04 RX ORDER — ONDANSETRON 2 MG/ML
4 INJECTION INTRAMUSCULAR; INTRAVENOUS ONCE
Status: COMPLETED | OUTPATIENT
Start: 2022-08-04 | End: 2022-08-04

## 2022-08-04 RX ORDER — LOSARTAN POTASSIUM 100 MG/1
100 TABLET ORAL DAILY
Status: DISCONTINUED | OUTPATIENT
Start: 2022-08-04 | End: 2022-08-04

## 2022-08-04 RX ORDER — NICOTINE POLACRILEX 4 MG
30 LOZENGE BUCCAL
Status: DISCONTINUED | OUTPATIENT
Start: 2022-08-04 | End: 2022-08-04

## 2022-08-04 RX ORDER — IOHEXOL 350 MG/ML
100 INJECTION, SOLUTION INTRAVENOUS
Status: COMPLETED | OUTPATIENT
Start: 2022-08-04 | End: 2022-08-04

## 2022-08-04 RX ORDER — AMLODIPINE BESYLATE 5 MG/1
5 TABLET ORAL EVERY EVENING
Status: DISCONTINUED | OUTPATIENT
Start: 2022-08-04 | End: 2022-08-04

## 2022-08-04 RX ORDER — ACETAMINOPHEN 500 MG
500 TABLET ORAL EVERY 4 HOURS PRN
Status: DISCONTINUED | OUTPATIENT
Start: 2022-08-04 | End: 2022-08-04

## 2022-08-04 RX ORDER — NICOTINE POLACRILEX 4 MG
15 LOZENGE BUCCAL
Status: DISCONTINUED | OUTPATIENT
Start: 2022-08-04 | End: 2022-08-04

## 2022-08-04 RX ORDER — VIT A/VIT C/VIT E/ZINC/COPPER 7160-113
TABLET, DELAYED RELEASE (ENTERIC COATED) ORAL 2 TIMES DAILY
COMMUNITY

## 2022-08-04 RX ORDER — HEPARIN SODIUM 5000 [USP'U]/ML
5000 INJECTION, SOLUTION INTRAVENOUS; SUBCUTANEOUS EVERY 8 HOURS SCHEDULED
Status: DISCONTINUED | OUTPATIENT
Start: 2022-08-04 | End: 2022-08-04

## 2022-08-04 NOTE — PROGRESS NOTES
08/04/22 1110 08/04/22 1112 08/04/22 1116   Vital Signs   /57 137/54 132/60   MAP (mmHg) 80 73 77   BP Location Left arm Left arm Left arm   BP Method Automatic Automatic Automatic   Patient Position Lying Sitting Standing   Oxygen Therapy   SpO2 96 % 97 % 100 %   Orthos.

## 2022-08-04 NOTE — PLAN OF CARE
Received pt at 0700. Pt is A&Ox4, no complaints of pain. Pt is on room air, lungs are clear/diminished bilaterally, productive cough- clear. Pt is in NSR, 1 degree HB present, no complaints of chest pain. He is continent of B&B, active bowel sounds, abdomen non-tender, last BM 8-3. Pt has a cast on his LLE due to a diabetic wound heel. Pt has redness/scaling/flakiness on his RLE. Patient updated with plan of care. POC  - round with cardiology             Problem: SAFETY ADULT - FALL  Goal: Free from fall injury  Description: INTERVENTIONS:  - Assess pt frequently for physical needs  - Identify cognitive and physical deficits and behaviors that affect risk of falls. - [place] fall precautions as indicated by assessment.  - Educate pt/family on patient safety including physical limitations  - Instruct pt to call for assistance with activity based on assessment  - Modify environment to reduce risk of injury  - Provide assistive devices as appropriate  - Consider OT/PT consult to assist with strengthening/mobility  - Encourage toileting schedule  Outcome: Progressing     Problem: CARDIOVASCULAR - ADULT  Goal: Maintains optimal cardiac output and hemodynamic stability  Description: INTERVENTIONS:  - Monitor vital signs, rhythm, and trends  - Monitor for bleeding, hypotension and signs of decreased cardiac output  - Evaluate effectiveness of vasoactive medications to optimize hemodynamic stability  - Monitor arterial and/or venous puncture sites for bleeding and/or hematoma  - Assess quality of pulses, skin color and temperature  - Assess for signs of decreased coronary artery perfusion - ex.  Angina  - Evaluate fluid balance, assess for edema, trend weights  Outcome: Progressing  Goal: Absence of cardiac arrhythmias or at baseline  Description: INTERVENTIONS:  - Continuous cardiac monitoring, monitor vital signs, obtain 12 lead EKG if indicated  - Evaluate effectiveness of antiarrhythmic and heart rate control medications as ordered  - Initiate emergency measures for life threatening arrhythmias  - Monitor electrolytes and administer replacement therapy as ordered  Outcome: Progressing     Problem: RESPIRATORY - ADULT  Goal: Achieves optimal ventilation and oxygenation  Description: INTERVENTIONS:  - Assess for changes in respiratory status  - Assess for changes in mentation and behavior  - Position to facilitate oxygenation and minimize respiratory effort  - Oxygen supplementation based on oxygen saturation or ABGs  - Provide Smoking Cessation handout, if applicable  - Encourage broncho-pulmonary hygiene including cough, deep breathe, Incentive Spirometry  - Assess the need for suctioning and perform as needed  - Assess and instruct to report SOB or any respiratory difficulty  - Respiratory Therapy support as indicated  - Manage/alleviate anxiety  - Monitor for signs/symptoms of CO2 retention  Outcome: Progressing     Problem: GASTROINTESTINAL - ADULT  Goal: Minimal or absence of nausea and vomiting  Description: INTERVENTIONS:  - Maintain adequate hydration with IV or PO as ordered and tolerated  - Nasogastric tube to low intermittent suction as ordered  - Evaluate effectiveness of ordered antiemetic medications  - Provide nonpharmacologic comfort measures as appropriate  - Advance diet as tolerated, if ordered  - Obtain nutritional consult as needed  - Evaluate fluid balance  Outcome: Progressing  Goal: Maintains or returns to baseline bowel function  Description: INTERVENTIONS:  - Assess bowel function  - Maintain adequate hydration with IV or PO as ordered and tolerated  - Evaluate effectiveness of GI medications  - Encourage mobilization and activity  - Obtain nutritional consult as needed  - Establish a toileting routine/schedule  - Consider collaborating with pharmacy to review patient's medication profile  Outcome: Progressing  Goal: Maintains adequate nutritional intake (undernourished)  Description: INTERVENTIONS:  - Monitor percentage of each meal consumed  - Identify factors contributing to decreased intake, treat as appropriate  - Assist with meals as needed  - Monitor I&O, WT and lab values  - Obtain nutritional consult as needed  - Optimize oral hygiene and moisture  - Encourage food from home; allow for food preferences  - Enhance eating environment  Outcome: Progressing  Goal: Achieves appropriate nutritional intake (bariatric)  Description: INTERVENTIONS:  - Monitor for over-consumption  - Identify factors contributing to increased intake, treat as appropriate  - Monitor I&O, WT and lab values  - Obtain nutritional consult as needed  - Evaluate psychosocial factors contributing to over-consumption  Outcome: Progressing     Problem: METABOLIC/FLUID AND ELECTROLYTES - ADULT  Goal: Glucose maintained within prescribed range  Description: INTERVENTIONS:  - Monitor Blood Glucose as ordered  - Assess for signs and symptoms of hyperglycemia and hypoglycemia  - Administer ordered medications to maintain glucose within target range  - Assess barriers to adequate nutritional intake and initiate nutrition consult as needed  - Instruct patient on self management of diabetes  Outcome: Not Progressing  Goal: Electrolytes maintained within normal limits  Description: INTERVENTIONS:  - Monitor labs and rhythm and assess patient for signs and symptoms of electrolyte imbalances  - Administer electrolyte replacement as ordered  - Monitor response to electrolyte replacements, including rhythm and repeat lab results as appropriate  - Fluid restriction as ordered  - Instruct patient on fluid and nutrition restrictions as appropriate  Outcome: Progressing     Problem: SKIN/TISSUE INTEGRITY - ADULT  Goal: Skin integrity remains intact  Description: INTERVENTIONS  - Assess and document risk factors for pressure ulcer development  - Assess and document skin integrity  - Monitor for areas of redness and/or skin breakdown  - Initiate interventions, skin care algorithm/standards of care as needed  Outcome: Progressing  Goal: Incision(s), wounds(s) or drain site(s) healing without S/S of infection  Description: INTERVENTIONS:  - Assess and document risk factors for pressure ulcer development  - Assess and document skin integrity  - Assess and document dressing/incision, wound bed, drain sites and surrounding tissue  - Implement wound care per orders  - Initiate isolation precautions as appropriate  - Initiate Pressure Ulcer prevention bundle as indicated  Outcome: Progressing  Goal: Oral mucous membranes remain intact  Description: INTERVENTIONS  - Assess oral mucosa and hygiene practices  - Implement preventative oral hygiene regimen  - Implement oral medicated treatments as ordered  Outcome: Progressing     Problem: HEMATOLOGIC - ADULT  Goal: Maintains hematologic stability  Description: INTERVENTIONS  - Assess for signs and symptoms of bleeding or hemorrhage  - Monitor labs and vital signs for trends  - Administer supportive blood products/factors, fluids and medications as ordered and appropriate  - Administer supportive blood products/factors as ordered and appropriate  Outcome: Progressing     Problem: Patient/Family Goals  Goal: Patient/Family Long Term Goal  Description: Patient's Long Term Goal: \"stay healthy\" 8-4    Interventions:  - med compliance  - follow ups    - See additional Care Plan goals for specific interventions  Outcome: Progressing  Goal: Patient/Family Short Term Goal  Description: Patient's Short Term Goal: \"no pain\" 8-4    Interventions:   - PRN meds  - hot/cold packs  - tell nurse if in pain    - See additional Care Plan goals for specific interventions  Outcome: Progressing

## 2022-08-04 NOTE — PROGRESS NOTES
08/04/22 0515 08/04/22 0520 08/04/22 0525   Vital Signs   Pulse 57 58 63   Heart Rate Source Monitor  --   --    Cardiac Rhythm NSR;Heart block  --   --    Resp 16  --   --    Respiratory Quality Normal  --   --    BP (!) 173/65 152/59 156/59   MAP (mmHg) 92 83 82   BP Location Left arm Left arm Left arm   BP Method Automatic Automatic Automatic   Patient Position Lying Sitting Standing   Oxygen Therapy   SpO2 94 % 96 % 97 %

## 2022-08-04 NOTE — PLAN OF CARE
Pt is ok to discharge per primary and consults. Discharge instructions including meds & follow ups given. Patient verbalizes understanding & questions answered. IV removed, tele monitor discontinued, all belongings taken with patient. Pt transported off unit via wheelchair to [email or web address].

## 2022-08-04 NOTE — PLAN OF CARE
Patient alert and oriented x4 on room air , sinus taras on cardiac monitor, denies any pain/ chest discomfort , cardiology consult on ,2DEcho in am, treadmill diet order maintained, blood sugar 181, coverage insulin administered , ivhf NS 83 ml/hr started , plan of care explained, admission navigator completed, LT leg with pressure ulcer wound , cast on , unable to see, rt leg dry and scaly , skin care refused, ,alert and calm , no acute distress noted,will continue to monitor closely      Problem: SAFETY ADULT - FALL  Goal: Free from fall injury  Description: INTERVENTIONS:  - Assess pt frequently for physical needs  - Identify cognitive and physical deficits and behaviors that affect risk of falls. - [place] fall precautions as indicated by assessment.  - Educate pt/family on patient safety including physical limitations  - Instruct pt to call for assistance with activity based on assessment  - Modify environment to reduce risk of injury  - Provide assistive devices as appropriate  - Consider OT/PT consult to assist with strengthening/mobility  - Encourage toileting schedule  Outcome: Progressing     Problem: CARDIOVASCULAR - ADULT  Goal: Maintains optimal cardiac output and hemodynamic stability  Description: INTERVENTIONS:  - Monitor vital signs, rhythm, and trends  - Monitor for bleeding, hypotension and signs of decreased cardiac output  - Evaluate effectiveness of vasoactive medications to optimize hemodynamic stability  - Monitor arterial and/or venous puncture sites for bleeding and/or hematoma  - Assess quality of pulses, skin color and temperature  - Assess for signs of decreased coronary artery perfusion - ex.  Angina  - Evaluate fluid balance, assess for edema, trend weights  Outcome: Progressing  Goal: Absence of cardiac arrhythmias or at baseline  Description: INTERVENTIONS:  - Continuous cardiac monitoring, monitor vital signs, obtain 12 lead EKG if indicated  - Evaluate effectiveness of antiarrhythmic and heart rate control medications as ordered  - Initiate emergency measures for life threatening arrhythmias  - Monitor electrolytes and administer replacement therapy as ordered  Outcome: Progressing     Problem: RESPIRATORY - ADULT  Goal: Achieves optimal ventilation and oxygenation  Description: INTERVENTIONS:  - Assess for changes in respiratory status  - Assess for changes in mentation and behavior  - Position to facilitate oxygenation and minimize respiratory effort  - Oxygen supplementation based on oxygen saturation or ABGs  - Provide Smoking Cessation handout, if applicable  - Encourage broncho-pulmonary hygiene including cough, deep breathe, Incentive Spirometry  - Assess the need for suctioning and perform as needed  - Assess and instruct to report SOB or any respiratory difficulty  - Respiratory Therapy support as indicated  - Manage/alleviate anxiety  - Monitor for signs/symptoms of CO2 retention  Outcome: Progressing     Problem: GASTROINTESTINAL - ADULT  Goal: Minimal or absence of nausea and vomiting  Description: INTERVENTIONS:  - Maintain adequate hydration with IV or PO as ordered and tolerated  - Nasogastric tube to low intermittent suction as ordered  - Evaluate effectiveness of ordered antiemetic medications  - Provide nonpharmacologic comfort measures as appropriate  - Advance diet as tolerated, if ordered  - Obtain nutritional consult as needed  - Evaluate fluid balance  Outcome: Progressing  Goal: Maintains or returns to baseline bowel function  Description: INTERVENTIONS:  - Assess bowel function  - Maintain adequate hydration with IV or PO as ordered and tolerated  - Evaluate effectiveness of GI medications  - Encourage mobilization and activity  - Obtain nutritional consult as needed  - Establish a toileting routine/schedule  - Consider collaborating with pharmacy to review patient's medication profile  Outcome: Progressing  Goal: Maintains adequate nutritional intake (undernourished)  Description: INTERVENTIONS:  - Monitor percentage of each meal consumed  - Identify factors contributing to decreased intake, treat as appropriate  - Assist with meals as needed  - Monitor I&O, WT and lab values  - Obtain nutritional consult as needed  - Optimize oral hygiene and moisture  - Encourage food from home; allow for food preferences  - Enhance eating environment  Outcome: Progressing  Goal: Achieves appropriate nutritional intake (bariatric)  Description: INTERVENTIONS:  - Monitor for over-consumption  - Identify factors contributing to increased intake, treat as appropriate  - Monitor I&O, WT and lab values  - Obtain nutritional consult as needed  - Evaluate psychosocial factors contributing to over-consumption  Outcome: Progressing     Problem: METABOLIC/FLUID AND ELECTROLYTES - ADULT  Goal: Glucose maintained within prescribed range  Description: INTERVENTIONS:  - Monitor Blood Glucose as ordered  - Assess for signs and symptoms of hyperglycemia and hypoglycemia  - Administer ordered medications to maintain glucose within target range  - Assess barriers to adequate nutritional intake and initiate nutrition consult as needed  - Instruct patient on self management of diabetes  Outcome: Progressing  Goal: Electrolytes maintained within normal limits  Description: INTERVENTIONS:  - Monitor labs and rhythm and assess patient for signs and symptoms of electrolyte imbalances  - Administer electrolyte replacement as ordered  - Monitor response to electrolyte replacements, including rhythm and repeat lab results as appropriate  - Fluid restriction as ordered  - Instruct patient on fluid and nutrition restrictions as appropriate  Outcome: Progressing     Problem: GASTROINTESTINAL - ADULT  Goal: Minimal or absence of nausea and vomiting  Description: INTERVENTIONS:  - Maintain adequate hydration with IV or PO as ordered and tolerated  - Nasogastric tube to low intermittent suction as ordered  - Evaluate effectiveness of ordered antiemetic medications  - Provide nonpharmacologic comfort measures as appropriate  - Advance diet as tolerated, if ordered  - Obtain nutritional consult as needed  - Evaluate fluid balance  Outcome: Progressing  Goal: Maintains or returns to baseline bowel function  Description: INTERVENTIONS:  - Assess bowel function  - Maintain adequate hydration with IV or PO as ordered and tolerated  - Evaluate effectiveness of GI medications  - Encourage mobilization and activity  - Obtain nutritional consult as needed  - Establish a toileting routine/schedule  - Consider collaborating with pharmacy to review patient's medication profile  Outcome: Progressing  Goal: Maintains adequate nutritional intake (undernourished)  Description: INTERVENTIONS:  - Monitor percentage of each meal consumed  - Identify factors contributing to decreased intake, treat as appropriate  - Assist with meals as needed  - Monitor I&O, WT and lab values  - Obtain nutritional consult as needed  - Optimize oral hygiene and moisture  - Encourage food from home; allow for food preferences  - Enhance eating environment  Outcome: Progressing  Goal: Achieves appropriate nutritional intake (bariatric)  Description: INTERVENTIONS:  - Monitor for over-consumption  - Identify factors contributing to increased intake, treat as appropriate  - Monitor I&O, WT and lab values  - Obtain nutritional consult as needed  - Evaluate psychosocial factors contributing to over-consumption  Outcome: Progressing     Problem: SKIN/TISSUE INTEGRITY - ADULT  Goal: Skin integrity remains intact  Description: INTERVENTIONS  - Assess and document risk factors for pressure ulcer development  - Assess and document skin integrity  - Monitor for areas of redness and/or skin breakdown  - Initiate interventions, skin care algorithm/standards of care as needed  Outcome: Progressing  Goal: Incision(s), wounds(s) or drain site(s) healing without S/S of infection  Description: INTERVENTIONS:  - Assess and document risk factors for pressure ulcer development  - Assess and document skin integrity  - Assess and document dressing/incision, wound bed, drain sites and surrounding tissue  - Implement wound care per orders  - Initiate isolation precautions as appropriate  - Initiate Pressure Ulcer prevention bundle as indicated  Outcome: Progressing  Goal: Oral mucous membranes remain intact  Description: INTERVENTIONS  - Assess oral mucosa and hygiene practices  - Implement preventative oral hygiene regimen  - Implement oral medicated treatments as ordered  Outcome: Progressing     Problem: HEMATOLOGIC - ADULT  Goal: Maintains hematologic stability  Description: INTERVENTIONS  - Assess for signs and symptoms of bleeding or hemorrhage  - Monitor labs and vital signs for trends  - Administer supportive blood products/factors, fluids and medications as ordered and appropriate  - Administer supportive blood products/factors as ordered and appropriate  Outcome: Progressing

## 2022-11-09 ENCOUNTER — ANCILLARY PROCEDURE (OUTPATIENT)
Dept: CARDIOLOGY | Age: 67
End: 2022-11-09
Attending: INTERNAL MEDICINE

## 2022-11-09 DIAGNOSIS — I35.0 NONRHEUMATIC AORTIC VALVE STENOSIS: ICD-10-CM

## 2022-11-09 LAB
AORTIC VALVE AREA (AVA): 0.85
AORTIC VALVE AREA: 2
AV MEAN GRADIENT (AVMG): 13
AV MEAN VELOCITY (AVMV): 1.63
AV PEAK GRADIENT (AVPG): 25
AV PEAK VELOCITY (AVPV): 2.51
AV STENOSIS SEVERITY TEXT: NORMAL
AVI LVOT PEAK GRADIENT (LVOTMG): 1
E WAVE DECELARATION TIME (MDT): 9.92
INTERVENTRICULAR SEPTUM IN END DIASTOLE (IVSD): 2.7
LEFT INTERNAL DIMENSION IN SYSTOLE (LVSD): 1
LEFT VENTRICULAR INTERNAL DIMENSION IN DIASTOLE (LVDD): 3.5
LEFT VENTRICULAR POSTERIOR WALL IN END DIASTOLE (LVPW): 5.3
LV EF: NORMAL %
LVOT 2D (LVOTD): 29.9
LVOT VTI (LVOTVTI): 1.12
MV E TISSUE VEL LAT (MELV): 1.13
MV E TISSUE VEL MED (MESV): 17.3
MV E WAVE VEL/E TISSUE VEL MED(MSR): 8.55
MV PEAK A VELOCITY (MVPAV): 240
MV PEAK E VELOCITY (MVPEV): 0.83
RV END SYSTOLIC LONGITUDINAL STRAIN FREE WALL (RVGS): 2.2
TV ESTIMATED RIGHT ARTERIAL PRESSURE (RAP): 14.4

## 2022-11-09 PROCEDURE — 93306 TTE W/DOPPLER COMPLETE: CPT | Performed by: INTERNAL MEDICINE

## 2022-11-11 ENCOUNTER — E-ADVICE (OUTPATIENT)
Dept: CARDIOLOGY | Age: 67
End: 2022-11-11

## 2022-11-11 ENCOUNTER — TELEPHONE (OUTPATIENT)
Dept: CARDIOLOGY | Age: 67
End: 2022-11-11

## 2022-11-16 ENCOUNTER — APPOINTMENT (OUTPATIENT)
Dept: CARDIOLOGY | Age: 67
End: 2022-11-16
Attending: INTERNAL MEDICINE

## 2022-11-23 RX ORDER — GABAPENTIN 800 MG/1
TABLET ORAL 3 TIMES DAILY
COMMUNITY
Start: 2022-10-06

## 2022-11-28 ENCOUNTER — OFFICE VISIT (OUTPATIENT)
Dept: CARDIOLOGY | Age: 67
End: 2022-11-28

## 2022-11-28 VITALS
DIASTOLIC BLOOD PRESSURE: 58 MMHG | HEART RATE: 72 BPM | BODY MASS INDEX: 35.31 KG/M2 | WEIGHT: 284 LBS | RESPIRATION RATE: 18 BRPM | HEIGHT: 75 IN | SYSTOLIC BLOOD PRESSURE: 120 MMHG

## 2022-11-28 DIAGNOSIS — E78.5 DYSLIPIDEMIA (HIGH LDL; LOW HDL): Primary | ICD-10-CM

## 2022-11-28 DIAGNOSIS — E11.9 TYPE 2 DIABETES MELLITUS WITHOUT COMPLICATION, WITH LONG-TERM CURRENT USE OF INSULIN (CMD): ICD-10-CM

## 2022-11-28 DIAGNOSIS — I35.0 NONRHEUMATIC AORTIC VALVE STENOSIS: ICD-10-CM

## 2022-11-28 DIAGNOSIS — Z79.4 TYPE 2 DIABETES MELLITUS WITHOUT COMPLICATION, WITH LONG-TERM CURRENT USE OF INSULIN (CMD): ICD-10-CM

## 2022-11-28 DIAGNOSIS — I10 ESSENTIAL HYPERTENSION: ICD-10-CM

## 2022-11-28 PROCEDURE — 3078F DIAST BP <80 MM HG: CPT | Performed by: INTERNAL MEDICINE

## 2022-11-28 PROCEDURE — 99214 OFFICE O/P EST MOD 30 MIN: CPT | Performed by: INTERNAL MEDICINE

## 2022-11-28 PROCEDURE — 3074F SYST BP LT 130 MM HG: CPT | Performed by: INTERNAL MEDICINE

## 2022-11-28 RX ORDER — IRBESARTAN 300 MG/1
300 TABLET ORAL DAILY
Qty: 90 TABLET | Refills: 3 | Status: SHIPPED | OUTPATIENT
Start: 2022-11-28

## 2022-11-28 RX ORDER — ROSUVASTATIN CALCIUM 20 MG/1
20 TABLET, COATED ORAL DAILY
Qty: 90 TABLET | Refills: 3 | Status: SHIPPED | OUTPATIENT
Start: 2022-11-28

## 2022-11-28 RX ORDER — AMLODIPINE BESYLATE 5 MG/1
5 TABLET ORAL EVERY EVENING
Qty: 90 TABLET | Refills: 3 | Status: SHIPPED | OUTPATIENT
Start: 2022-11-28 | End: 2023-02-02 | Stop reason: SDUPTHER

## 2022-11-28 ASSESSMENT — ENCOUNTER SYMPTOMS
SUSPICIOUS LESIONS: 0
HEMATOCHEZIA: 0
COUGH: 0
BRUISES/BLEEDS EASILY: 0
CHILLS: 0
ALLERGIC/IMMUNOLOGIC COMMENTS: NO NEW FOOD ALLERGIES
WEIGHT GAIN: 0
HEMOPTYSIS: 0
FEVER: 0
WEIGHT LOSS: 0

## 2023-02-02 ENCOUNTER — TELEPHONE (OUTPATIENT)
Dept: CARDIOLOGY | Age: 68
End: 2023-02-02

## 2023-02-02 RX ORDER — AMLODIPINE BESYLATE 5 MG/1
5 TABLET ORAL EVERY EVENING
Qty: 90 TABLET | Refills: 3 | Status: SHIPPED | OUTPATIENT
Start: 2023-02-02

## 2023-02-27 RX ORDER — IRBESARTAN 300 MG/1
300 TABLET ORAL DAILY
Qty: 30 TABLET | OUTPATIENT
Start: 2023-02-27

## 2023-10-24 ENCOUNTER — TELEPHONE (OUTPATIENT)
Dept: CARDIOLOGY | Age: 68
End: 2023-10-24

## 2023-10-24 DIAGNOSIS — I35.0 NONRHEUMATIC AORTIC VALVE STENOSIS: Primary | ICD-10-CM

## 2023-11-09 ENCOUNTER — TELEPHONE (OUTPATIENT)
Dept: CARDIOLOGY | Age: 68
End: 2023-11-09

## 2023-11-09 DIAGNOSIS — R00.1 BRADYCARDIA, UNSPECIFIED: Primary | ICD-10-CM

## 2023-11-10 ENCOUNTER — ANCILLARY PROCEDURE (OUTPATIENT)
Dept: CARDIOLOGY | Age: 68
End: 2023-11-10
Attending: INTERNAL MEDICINE

## 2023-11-10 DIAGNOSIS — I35.0 NONRHEUMATIC AORTIC VALVE STENOSIS: ICD-10-CM

## 2023-11-10 LAB
AORTIC VALVE AREA (AVA): 1.11
AORTIC VALVE AREA: 1.8
AV MEAN GRADIENT (AVMG): 12
AV MEAN VELOCITY (AVMV): 1.57
AV PEAK GRADIENT (AVPG): 25
AV PEAK VELOCITY (AVPV): 2.5
AV STENOSIS SEVERITY TEXT: NORMAL
AVI LVOT PEAK GRADIENT (LVOTMG): 0.9
E WAVE DECELARATION TIME (MDT): 15.83
LEFT INTERNAL DIMENSION IN SYSTOLE (LVSD): 0.8
LEFT VENTRICULAR INTERNAL DIMENSION IN DIASTOLE (LVDD): 3.9
LEFT VENTRICULAR POSTERIOR WALL IN END DIASTOLE (LVPW): 6.1
LV EF: NORMAL %
LVOT 2D (LVOTD): 26.2
LVOT VTI (LVOTVTI): 1.04
MV E TISSUE VEL LAT (MELV): 0.91
MV E TISSUE VEL MED (MESV): 11.9
MV E WAVE VEL/E TISSUE VEL MED(MSR): 7.01
MV PEAK A VELOCITY (MVPAV): 195
MV PEAK E VELOCITY (MVPEV): 0.65
RV END SYSTOLIC LONGITUDINAL STRAIN FREE WALL (RVGS): 2.2
TRICUSPID VALVE ANNULAR PEAK VELOCITY (TVAPV): 21
TRICUSPID VALVE PEAK REGURGITATION VELOCITY (TRPV): 3.2
TV ESTIMATED RIGHT ARTERIAL PRESSURE (RAP): 11.9

## 2023-11-10 PROCEDURE — 93306 TTE W/DOPPLER COMPLETE: CPT | Performed by: INTERNAL MEDICINE

## 2023-11-16 ENCOUNTER — E-ADVICE (OUTPATIENT)
Dept: CARDIOLOGY | Age: 68
End: 2023-11-16

## 2023-11-27 ENCOUNTER — HOSPITAL ENCOUNTER (OUTPATIENT)
Dept: CARDIOLOGY | Age: 68
End: 2023-11-27
Attending: INTERNAL MEDICINE

## 2023-11-27 ENCOUNTER — APPOINTMENT (OUTPATIENT)
Dept: CARDIOLOGY | Age: 68
End: 2023-11-27

## 2023-11-27 VITALS
BODY MASS INDEX: 35.43 KG/M2 | HEART RATE: 57 BPM | SYSTOLIC BLOOD PRESSURE: 113 MMHG | HEIGHT: 75 IN | WEIGHT: 285 LBS | DIASTOLIC BLOOD PRESSURE: 49 MMHG

## 2023-11-27 DIAGNOSIS — Z79.4 TYPE 2 DIABETES MELLITUS WITHOUT COMPLICATION, WITH LONG-TERM CURRENT USE OF INSULIN (CMD): ICD-10-CM

## 2023-11-27 DIAGNOSIS — I10 ESSENTIAL HYPERTENSION: ICD-10-CM

## 2023-11-27 DIAGNOSIS — E78.5 DYSLIPIDEMIA (HIGH LDL; LOW HDL): ICD-10-CM

## 2023-11-27 DIAGNOSIS — I35.0 NONRHEUMATIC AORTIC VALVE STENOSIS: Primary | ICD-10-CM

## 2023-11-27 DIAGNOSIS — E11.9 TYPE 2 DIABETES MELLITUS WITHOUT COMPLICATION, WITH LONG-TERM CURRENT USE OF INSULIN (CMD): ICD-10-CM

## 2023-11-27 PROCEDURE — 3078F DIAST BP <80 MM HG: CPT | Performed by: INTERNAL MEDICINE

## 2023-11-27 PROCEDURE — 99214 OFFICE O/P EST MOD 30 MIN: CPT | Performed by: INTERNAL MEDICINE

## 2023-11-27 PROCEDURE — 3074F SYST BP LT 130 MM HG: CPT | Performed by: INTERNAL MEDICINE

## 2023-11-27 ASSESSMENT — ENCOUNTER SYMPTOMS
FEVER: 0
WEIGHT GAIN: 0
WEIGHT LOSS: 0
BRUISES/BLEEDS EASILY: 0
HEMATOCHEZIA: 0
SUSPICIOUS LESIONS: 0
CHILLS: 0
HEMOPTYSIS: 0
ALLERGIC/IMMUNOLOGIC COMMENTS: NO NEW FOOD ALLERGIES
COUGH: 0

## 2023-12-05 ENCOUNTER — HOSPITAL ENCOUNTER (OUTPATIENT)
Dept: CARDIOLOGY | Age: 68
Discharge: HOME | End: 2023-12-05
Attending: INTERNAL MEDICINE

## 2023-12-05 ENCOUNTER — E-ADVICE (OUTPATIENT)
Dept: CARDIOLOGY | Age: 68
End: 2023-12-05

## 2023-12-05 DIAGNOSIS — R00.1 BRADYCARDIA, UNSPECIFIED: ICD-10-CM

## 2023-12-26 ENCOUNTER — E-ADVICE (OUTPATIENT)
Dept: CARDIOLOGY | Age: 68
End: 2023-12-26

## 2023-12-26 RX ORDER — METOPROLOL SUCCINATE 25 MG/1
25 TABLET, EXTENDED RELEASE ORAL DAILY
Qty: 90 TABLET | Refills: 3 | Status: SHIPPED | OUTPATIENT
Start: 2023-12-26

## 2023-12-27 ENCOUNTER — HOSPITAL ENCOUNTER (OUTPATIENT)
Facility: HOSPITAL | Age: 68
Setting detail: OBSERVATION
Discharge: HOME | End: 2023-12-28
Attending: EMERGENCY MEDICINE | Admitting: INTERNAL MEDICINE
Payer: MEDICAID

## 2023-12-27 ENCOUNTER — APPOINTMENT (OUTPATIENT)
Dept: GENERAL RADIOLOGY | Facility: HOSPITAL | Age: 68
End: 2023-12-27
Payer: MEDICAID

## 2023-12-27 DIAGNOSIS — R07.9 CHEST PAIN, UNSPECIFIED TYPE: Primary | ICD-10-CM

## 2023-12-27 DIAGNOSIS — R79.89 ELEVATED TROPONIN: ICD-10-CM

## 2023-12-27 LAB
ALBUMIN SERPL-MCNC: 3.6 G/DL (ref 3.4–5)
ALBUMIN/GLOB SERPL: 0.9 {RATIO} (ref 1–2)
ALP LIVER SERPL-CCNC: 90 U/L
ALT SERPL-CCNC: 58 U/L
ANION GAP SERPL CALC-SCNC: 3 MMOL/L (ref 0–18)
AST SERPL-CCNC: 30 U/L (ref 15–37)
BASOPHILS # BLD AUTO: 0.06 X10(3) UL (ref 0–0.2)
BASOPHILS NFR BLD AUTO: 0.8 %
BILIRUB SERPL-MCNC: 0.4 MG/DL (ref 0.1–2)
BUN BLD-MCNC: 24 MG/DL (ref 9–23)
CALCIUM BLD-MCNC: 8.7 MG/DL (ref 8.5–10.1)
CHLORIDE SERPL-SCNC: 106 MMOL/L (ref 98–112)
CHOLEST SERPL-MCNC: 107 MG/DL (ref ?–200)
CO2 SERPL-SCNC: 30 MMOL/L (ref 21–32)
CREAT BLD-MCNC: 1.43 MG/DL
EGFRCR SERPLBLD CKD-EPI 2021: 54 ML/MIN/1.73M2 (ref 60–?)
EOSINOPHIL # BLD AUTO: 0.36 X10(3) UL (ref 0–0.7)
EOSINOPHIL NFR BLD AUTO: 4.8 %
ERYTHROCYTE [DISTWIDTH] IN BLOOD BY AUTOMATED COUNT: 16.3 %
GLOBULIN PLAS-MCNC: 4 G/DL (ref 2.8–4.4)
GLUCOSE BLD-MCNC: 72 MG/DL (ref 70–99)
GLUCOSE BLD-MCNC: 79 MG/DL (ref 70–99)
HCT VFR BLD AUTO: 40.1 %
HDLC SERPL-MCNC: 45 MG/DL (ref 40–59)
HGB BLD-MCNC: 12.9 G/DL
IMM GRANULOCYTES # BLD AUTO: 0.04 X10(3) UL (ref 0–1)
IMM GRANULOCYTES NFR BLD: 0.5 %
LDLC SERPL CALC-MCNC: 42 MG/DL (ref ?–100)
LYMPHOCYTES # BLD AUTO: 1.98 X10(3) UL (ref 1–4)
LYMPHOCYTES NFR BLD AUTO: 26.3 %
MCH RBC QN AUTO: 26.9 PG (ref 26–34)
MCHC RBC AUTO-ENTMCNC: 32.2 G/DL (ref 31–37)
MCV RBC AUTO: 83.7 FL
MONOCYTES # BLD AUTO: 0.63 X10(3) UL (ref 0.1–1)
MONOCYTES NFR BLD AUTO: 8.4 %
NEUTROPHILS # BLD AUTO: 4.45 X10 (3) UL (ref 1.5–7.7)
NEUTROPHILS # BLD AUTO: 4.45 X10(3) UL (ref 1.5–7.7)
NEUTROPHILS NFR BLD AUTO: 59.2 %
NONHDLC SERPL-MCNC: 62 MG/DL (ref ?–130)
OSMOLALITY SERPL CALC.SUM OF ELEC: 291 MOSM/KG (ref 275–295)
PLATELET # BLD AUTO: 214 10(3)UL (ref 150–450)
POTASSIUM SERPL-SCNC: 4.1 MMOL/L (ref 3.5–5.1)
PROT SERPL-MCNC: 7.6 G/DL (ref 6.4–8.2)
RBC # BLD AUTO: 4.79 X10(6)UL
SODIUM SERPL-SCNC: 139 MMOL/L (ref 136–145)
TRIGL SERPL-MCNC: 110 MG/DL (ref 30–149)
TROPONIN I SERPL HS-MCNC: 177 NG/L
TROPONIN I SERPL HS-MCNC: 191 NG/L
VLDLC SERPL CALC-MCNC: 15 MG/DL (ref 0–30)
WBC # BLD AUTO: 7.5 X10(3) UL (ref 4–11)

## 2023-12-27 PROCEDURE — 71045 X-RAY EXAM CHEST 1 VIEW: CPT

## 2023-12-27 PROCEDURE — 85025 COMPLETE CBC W/AUTO DIFF WBC: CPT | Performed by: EMERGENCY MEDICINE

## 2023-12-27 PROCEDURE — 93005 ELECTROCARDIOGRAM TRACING: CPT

## 2023-12-27 PROCEDURE — 93010 ELECTROCARDIOGRAM REPORT: CPT

## 2023-12-27 PROCEDURE — 80061 LIPID PANEL: CPT

## 2023-12-27 PROCEDURE — 80053 COMPREHEN METABOLIC PANEL: CPT | Performed by: EMERGENCY MEDICINE

## 2023-12-27 PROCEDURE — 99285 EMERGENCY DEPT VISIT HI MDM: CPT

## 2023-12-27 PROCEDURE — 96374 THER/PROPH/DIAG INJ IV PUSH: CPT

## 2023-12-27 PROCEDURE — 82962 GLUCOSE BLOOD TEST: CPT

## 2023-12-27 PROCEDURE — 83036 HEMOGLOBIN GLYCOSYLATED A1C: CPT | Performed by: INTERNAL MEDICINE

## 2023-12-27 PROCEDURE — 85025 COMPLETE CBC W/AUTO DIFF WBC: CPT

## 2023-12-27 PROCEDURE — 84484 ASSAY OF TROPONIN QUANT: CPT

## 2023-12-27 PROCEDURE — 80061 LIPID PANEL: CPT | Performed by: EMERGENCY MEDICINE

## 2023-12-27 PROCEDURE — 96375 TX/PRO/DX INJ NEW DRUG ADDON: CPT

## 2023-12-27 PROCEDURE — 84484 ASSAY OF TROPONIN QUANT: CPT | Performed by: EMERGENCY MEDICINE

## 2023-12-27 PROCEDURE — 80053 COMPREHEN METABOLIC PANEL: CPT

## 2023-12-27 RX ORDER — AMLODIPINE BESYLATE 5 MG/1
5 TABLET ORAL ONCE
Status: COMPLETED | OUTPATIENT
Start: 2023-12-27 | End: 2023-12-27

## 2023-12-27 RX ORDER — ONDANSETRON 2 MG/ML
4 INJECTION INTRAMUSCULAR; INTRAVENOUS ONCE
Status: COMPLETED | OUTPATIENT
Start: 2023-12-27 | End: 2023-12-27

## 2023-12-27 RX ORDER — ASPIRIN 81 MG/1
324 TABLET, CHEWABLE ORAL ONCE
Status: COMPLETED | OUTPATIENT
Start: 2023-12-27 | End: 2023-12-27

## 2023-12-27 RX ORDER — MORPHINE SULFATE 4 MG/ML
2 INJECTION, SOLUTION INTRAMUSCULAR; INTRAVENOUS EVERY 30 MIN PRN
Status: DISCONTINUED | OUTPATIENT
Start: 2023-12-27 | End: 2023-12-28

## 2023-12-27 RX ORDER — METOPROLOL SUCCINATE 25 MG/1
25 TABLET, EXTENDED RELEASE ORAL DAILY
COMMUNITY
Start: 2023-12-26

## 2023-12-27 NOTE — ED INITIAL ASSESSMENT (HPI)
Patient to ER w/ complaints of chest pressure since 1400 today. Started new medication, metoprolol, yesterday.

## 2023-12-28 VITALS
RESPIRATION RATE: 16 BRPM | DIASTOLIC BLOOD PRESSURE: 64 MMHG | TEMPERATURE: 98 F | OXYGEN SATURATION: 100 % | HEART RATE: 52 BPM | SYSTOLIC BLOOD PRESSURE: 157 MMHG | HEIGHT: 72 IN | BODY MASS INDEX: 37.93 KG/M2 | WEIGHT: 280 LBS

## 2023-12-28 LAB
ANION GAP SERPL CALC-SCNC: 1 MMOL/L (ref 0–18)
ATRIAL RATE: 55 BPM
BUN BLD-MCNC: 21 MG/DL (ref 9–23)
CALCIUM BLD-MCNC: 9 MG/DL (ref 8.5–10.1)
CHLORIDE SERPL-SCNC: 106 MMOL/L (ref 98–112)
CO2 SERPL-SCNC: 29 MMOL/L (ref 21–32)
CREAT BLD-MCNC: 1.15 MG/DL
EGFRCR SERPLBLD CKD-EPI 2021: 70 ML/MIN/1.73M2 (ref 60–?)
EST. AVERAGE GLUCOSE BLD GHB EST-MCNC: 131 MG/DL (ref 68–126)
GLUCOSE BLD-MCNC: 121 MG/DL (ref 70–99)
GLUCOSE BLD-MCNC: 177 MG/DL (ref 70–99)
GLUCOSE BLD-MCNC: 190 MG/DL (ref 70–99)
HBA1C MFR BLD: 6.2 % (ref ?–5.7)
OSMOLALITY SERPL CALC.SUM OF ELEC: 289 MOSM/KG (ref 275–295)
P AXIS: 45 DEGREES
P-R INTERVAL: 212 MS
POTASSIUM SERPL-SCNC: 4.3 MMOL/L (ref 3.5–5.1)
Q-T INTERVAL: 436 MS
QRS DURATION: 136 MS
QTC CALCULATION (BEZET): 417 MS
R AXIS: -27 DEGREES
SODIUM SERPL-SCNC: 136 MMOL/L (ref 136–145)
T AXIS: 75 DEGREES
TROPONIN I SERPL HS-MCNC: 181 NG/L
VENTRICULAR RATE: 55 BPM

## 2023-12-28 PROCEDURE — 82962 GLUCOSE BLOOD TEST: CPT

## 2023-12-28 PROCEDURE — 96376 TX/PRO/DX INJ SAME DRUG ADON: CPT

## 2023-12-28 PROCEDURE — 80048 BASIC METABOLIC PNL TOTAL CA: CPT | Performed by: INTERNAL MEDICINE

## 2023-12-28 PROCEDURE — 84484 ASSAY OF TROPONIN QUANT: CPT | Performed by: INTERNAL MEDICINE

## 2023-12-28 RX ORDER — BENZONATATE 100 MG/1
100 CAPSULE ORAL 3 TIMES DAILY PRN
Status: DISCONTINUED | OUTPATIENT
Start: 2023-12-28 | End: 2023-12-28

## 2023-12-28 RX ORDER — METOPROLOL SUCCINATE 25 MG/1
25 TABLET, EXTENDED RELEASE ORAL
Status: DISCONTINUED | OUTPATIENT
Start: 2023-12-29 | End: 2023-12-28

## 2023-12-28 RX ORDER — SENNOSIDES 8.6 MG
17.2 TABLET ORAL NIGHTLY PRN
Status: DISCONTINUED | OUTPATIENT
Start: 2023-12-28 | End: 2023-12-28

## 2023-12-28 RX ORDER — ONDANSETRON 2 MG/ML
4 INJECTION INTRAMUSCULAR; INTRAVENOUS EVERY 6 HOURS PRN
Status: DISCONTINUED | OUTPATIENT
Start: 2023-12-28 | End: 2023-12-28

## 2023-12-28 RX ORDER — ENEMA 19; 7 G/133ML; G/133ML
1 ENEMA RECTAL ONCE AS NEEDED
Status: DISCONTINUED | OUTPATIENT
Start: 2023-12-28 | End: 2023-12-28

## 2023-12-28 RX ORDER — METOCLOPRAMIDE HYDROCHLORIDE 5 MG/ML
10 INJECTION INTRAMUSCULAR; INTRAVENOUS EVERY 8 HOURS PRN
Status: DISCONTINUED | OUTPATIENT
Start: 2023-12-28 | End: 2023-12-28

## 2023-12-28 RX ORDER — NICOTINE POLACRILEX 4 MG
30 LOZENGE BUCCAL
Status: DISCONTINUED | OUTPATIENT
Start: 2023-12-28 | End: 2023-12-28

## 2023-12-28 RX ORDER — NICOTINE POLACRILEX 4 MG
15 LOZENGE BUCCAL
Status: DISCONTINUED | OUTPATIENT
Start: 2023-12-28 | End: 2023-12-28

## 2023-12-28 RX ORDER — MELATONIN
3 NIGHTLY PRN
Status: DISCONTINUED | OUTPATIENT
Start: 2023-12-28 | End: 2023-12-28

## 2023-12-28 RX ORDER — POLYETHYLENE GLYCOL 3350 17 G/17G
17 POWDER, FOR SOLUTION ORAL DAILY PRN
Status: DISCONTINUED | OUTPATIENT
Start: 2023-12-28 | End: 2023-12-28

## 2023-12-28 RX ORDER — ONDANSETRON 2 MG/ML
4 INJECTION INTRAMUSCULAR; INTRAVENOUS EVERY 4 HOURS PRN
Status: ACTIVE | OUTPATIENT
Start: 2023-12-28 | End: 2023-12-28

## 2023-12-28 RX ORDER — MAGNESIUM OXIDE 400 MG/1
400 TABLET ORAL EVERY OTHER DAY
Status: DISCONTINUED | OUTPATIENT
Start: 2023-12-28 | End: 2023-12-28

## 2023-12-28 RX ORDER — ASPIRIN 325 MG
325 TABLET ORAL DAILY
Status: DISCONTINUED | OUTPATIENT
Start: 2023-12-28 | End: 2023-12-28

## 2023-12-28 RX ORDER — LOSARTAN POTASSIUM 100 MG/1
100 TABLET ORAL DAILY
Status: DISCONTINUED | OUTPATIENT
Start: 2023-12-28 | End: 2023-12-28

## 2023-12-28 RX ORDER — BISACODYL 10 MG
10 SUPPOSITORY, RECTAL RECTAL
Status: DISCONTINUED | OUTPATIENT
Start: 2023-12-28 | End: 2023-12-28

## 2023-12-28 RX ORDER — AMLODIPINE BESYLATE 5 MG/1
5 TABLET ORAL EVERY EVENING
Status: DISCONTINUED | OUTPATIENT
Start: 2023-12-28 | End: 2023-12-28

## 2023-12-28 RX ORDER — GABAPENTIN 400 MG/1
800 CAPSULE ORAL 3 TIMES DAILY
Status: DISCONTINUED | OUTPATIENT
Start: 2023-12-28 | End: 2023-12-28

## 2023-12-28 RX ORDER — ACETAMINOPHEN 500 MG
500 TABLET ORAL EVERY 4 HOURS PRN
Status: DISCONTINUED | OUTPATIENT
Start: 2023-12-28 | End: 2023-12-28

## 2023-12-28 RX ORDER — DEXTROSE MONOHYDRATE 25 G/50ML
50 INJECTION, SOLUTION INTRAVENOUS
Status: DISCONTINUED | OUTPATIENT
Start: 2023-12-28 | End: 2023-12-28

## 2023-12-28 RX ORDER — ROSUVASTATIN CALCIUM 20 MG/1
20 TABLET, COATED ORAL NIGHTLY
Status: DISCONTINUED | OUTPATIENT
Start: 2023-12-28 | End: 2023-12-28

## 2023-12-28 NOTE — PLAN OF CARE
Susan Valera Patient Status:  Inpatient    1955 MRN [id number]   [facility] 1NE-A Attending Krystyna Lopez MD   Hosp Day # 0 PCP Bal Alvarez MD       Cardiology Nocturnal APN Note    Page Received: Dr. Maximus Walker, ED Physician    HPI:     Patient is a 80year old male with PMH of HTN, HLD, aortic valve disease, DM II, peripheral neuropathy, Charcot's joint disease, PVD, hemorrhagic CVA, and anemia who presented to the ED with c/o chest pain that started in the afternoon. Pt reported mid-sternal chest pain with no associated symptoms. In ED, pt was hypertensive with SBP 170s-190s. Pt was given amlodipine. [facility] consulted for chest pain r/o ACS. Review of records in [facility] showed echocardiogram completed on 11/10/2023 showed normal left ventricular cavity size, wall thickness and systolic function. EF 56%. Holter monitoring results from 2023 showed primary rhythm was sinus rhythm. PVC burden was 4.77%. No symptoms correlating to arrhythmia. Stress test in  was unremarkable. HPI obtained from chart review and information provided by ED physician. ED Clinical Course    EKG: SR. No acute ischemic changes.      Labs: Cr 1.43, troponin 177, Hgb 12.9    Imaging: CXR unremarkable    Medications: Amlodipine, aspirin              Vital Signs:       2023     1:30 AM 2023     2:11 AM   Vitals History   /68    Pulse 51    Resp 10    SpO2 96 %    Weight  280 lbs   BMI  37.97 kg/m2        Labs:   Lab Results   Component Value Date    WBC 7.5 2023    HGB 12.9 2023    HCT 40.1 2023    .0 2023    CREATSERUM 1.43 2023    BUN 24 2023     2023    K 4.1 2023     2023    CO2 30.0 2023    GLU 72 2023    CA 8.7 2023    ALB 3.6 2023    ALKPHO 90 2023    BILT 0.4 2023    TP 7.6 2023    AST 30 2023    ALT 58 2023    TROPHS 191 2023 Diagnostics:   XR CHEST AP PORTABLE  (CPT=71045)    Result Date: 12/27/2023  CONCLUSION:  Normal heart size and pulmonary vascularity. Minimal linear atelectasis in the lung bases. No focal infiltrate, consolidation, effusion or pneumothorax. LOCATION:  [id number]      Dictated by (CST): Michael Keen MD on 12/27/2023 at 5:49 PM     Finalized by (CST): Michael Keen MD on 12/27/2023 at 5:50 PM        Allergies: Allergies   Allergen Reactions    Coffee Bean Extract [Coffea Arabica] OTHER (SEE COMMENTS)     Vision changes  Gives blurry vision       Medications:    ondansetron    acetaminophen    melatonin    ondansetron    metoclopramide    polyethylene glycol (PEG 3350)    sennosides    bisacodyl    fleet enema    benzonatate    amLODIPine    aspirin    gabapentin    insulin detemir    losartan    magnesium oxide    [START ON 12/29/2023] metoprolol succinate ER    rosuvastatin    glucose **OR** glucose **OR** glucose-vitamin C **OR** dextrose **OR** glucose **OR** glucose **OR** glucose-vitamin C    insulin aspart    morphINE       Assessment/Plan:    - Troponin chronically elevated. In 2022 was around 120. Today 177-->191. No chest pain. Continue to trend. - Continue to monitor overnight on telemetry  - Formal cardiology consult to follow in AM.       Lenora Anaya, [id number]  12/28/2023  3:08 AM

## 2023-12-28 NOTE — ED QUICK NOTES
Orders for admission, patient is aware of plan and ready to go upstairs. Any questions, please call ED RN Pacheco Markham at extension [phone].      Patient Covid vaccination status: Fully vaccinated     COVID Test Ordered in ED: None    COVID Suspicion at Admission: N/A    Running Infusions:  None    Mental Status/LOC at time of transport: A&Ox4    Other pertinent information:   CIWA score: N/A   NIH score:  N/A

## 2023-12-28 NOTE — PLAN OF CARE
PT ADMITTED A/O, 93% ON RA, SB, HR - 50'S,  AFEBRILE, DENIES PAIN, LT FOOT WITH BOOT ON, HAS DIABETIC WOUND ON FOOT WITH DRESSING INTACT, INSTRUCTED PT ON POC, LABS IN AM    Problem: PAIN - ADULT  Goal: Verbalizes/displays adequate comfort level or patient's stated pain goal  Description: INTERVENTIONS:  - Encourage pt to monitor pain and request assistance  - Assess pain using appropriate pain scale  - Administer analgesics based on type and severity of pain and evaluate response  - Implement non-pharmacological measures as appropriate and evaluate response  - Consider cultural and social influences on pain and pain management  - Manage/alleviate anxiety  - Utilize distraction and/or relaxation techniques  - Monitor for opioid side effects  - Notify MD/LIP if interventions unsuccessful or patient reports new pain  - Anticipate increased pain with activity and pre-medicate as appropriate  Outcome: Progressing

## 2023-12-28 NOTE — DISCHARGE SUMMARY
Admission/discharge date: 12/28/2023    80year old male with PMH sig for HTN, HL, DM2, DFU LLE, obesity, hx CVA p/w chest pain.      Atypical chest pain  - unlikely ACS  - chronic trop elevation  - symptoms resolved spontaneously  - cardiology cleared for dc, OP f/u     Chronic:  HTN  HL  DM2  Diabetic foot ulcer L foot  - resume home meds as able    Dispo - clear for discharge

## 2024-01-09 ENCOUNTER — APPOINTMENT (OUTPATIENT)
Dept: CARDIOLOGY | Age: 69
End: 2024-01-09

## 2024-01-09 VITALS
HEART RATE: 51 BPM | DIASTOLIC BLOOD PRESSURE: 73 MMHG | SYSTOLIC BLOOD PRESSURE: 151 MMHG | HEIGHT: 75 IN | BODY MASS INDEX: 35.43 KG/M2 | WEIGHT: 285 LBS

## 2024-01-09 DIAGNOSIS — Z79.4 TYPE 2 DIABETES MELLITUS WITHOUT COMPLICATION, WITH LONG-TERM CURRENT USE OF INSULIN (CMD): Primary | ICD-10-CM

## 2024-01-09 DIAGNOSIS — E11.9 TYPE 2 DIABETES MELLITUS WITHOUT COMPLICATION, WITH LONG-TERM CURRENT USE OF INSULIN (CMD): Primary | ICD-10-CM

## 2024-01-09 DIAGNOSIS — E78.5 DYSLIPIDEMIA (HIGH LDL; LOW HDL): ICD-10-CM

## 2024-01-09 DIAGNOSIS — I35.0 NONRHEUMATIC AORTIC VALVE STENOSIS: ICD-10-CM

## 2024-01-09 DIAGNOSIS — R07.9 CHEST PAIN, UNSPECIFIED TYPE: ICD-10-CM

## 2024-01-09 DIAGNOSIS — I10 ESSENTIAL HYPERTENSION: ICD-10-CM

## 2024-01-09 PROCEDURE — 3078F DIAST BP <80 MM HG: CPT | Performed by: INTERNAL MEDICINE

## 2024-01-09 PROCEDURE — 3077F SYST BP >= 140 MM HG: CPT | Performed by: INTERNAL MEDICINE

## 2024-01-09 PROCEDURE — 99214 OFFICE O/P EST MOD 30 MIN: CPT | Performed by: INTERNAL MEDICINE

## 2024-01-09 SDOH — HEALTH STABILITY: PHYSICAL HEALTH: ON AVERAGE, HOW MANY DAYS PER WEEK DO YOU ENGAGE IN MODERATE TO STRENUOUS EXERCISE (LIKE A BRISK WALK)?: 0 DAYS

## 2024-01-09 SDOH — HEALTH STABILITY: PHYSICAL HEALTH: ON AVERAGE, HOW MANY MINUTES DO YOU ENGAGE IN EXERCISE AT THIS LEVEL?: 0 MIN

## 2024-01-09 ASSESSMENT — ENCOUNTER SYMPTOMS
ALLERGIC/IMMUNOLOGIC COMMENTS: NO NEW FOOD ALLERGIES
FEVER: 0
COUGH: 0
CHILLS: 0
HEMOPTYSIS: 0
BRUISES/BLEEDS EASILY: 0
HEMATOCHEZIA: 0
WEIGHT LOSS: 0
WEIGHT GAIN: 0
SUSPICIOUS LESIONS: 0

## 2024-01-09 ASSESSMENT — PATIENT HEALTH QUESTIONNAIRE - PHQ9
CLINICAL INTERPRETATION OF PHQ2 SCORE: NO FURTHER SCREENING NEEDED
2. FEELING DOWN, DEPRESSED OR HOPELESS: NOT AT ALL
SUM OF ALL RESPONSES TO PHQ9 QUESTIONS 1 AND 2: 0
SUM OF ALL RESPONSES TO PHQ9 QUESTIONS 1 AND 2: 0
1. LITTLE INTEREST OR PLEASURE IN DOING THINGS: NOT AT ALL

## 2024-01-10 RX ORDER — HYDROCODONE BITARTRATE AND ACETAMINOPHEN 5; 325 MG/1; MG/1
1 TABLET ORAL EVERY 6 HOURS PRN
COMMUNITY

## 2024-01-23 ENCOUNTER — APPOINTMENT (OUTPATIENT)
Dept: CARDIOLOGY | Age: 69
End: 2024-01-23
Attending: INTERNAL MEDICINE

## 2024-01-24 ENCOUNTER — HOSPITAL ENCOUNTER (OUTPATIENT)
Facility: HOSPITAL | Age: 69
Setting detail: HOSPITAL OUTPATIENT SURGERY
Discharge: HOME | End: 2024-01-24
Attending: INTERNAL MEDICINE | Admitting: INTERNAL MEDICINE
Payer: MEDICAID

## 2024-01-24 VITALS
SYSTOLIC BLOOD PRESSURE: 157 MMHG | TEMPERATURE: 98 F | HEIGHT: 72 IN | DIASTOLIC BLOOD PRESSURE: 65 MMHG | BODY MASS INDEX: 38.6 KG/M2 | OXYGEN SATURATION: 98 % | RESPIRATION RATE: 17 BRPM | HEART RATE: 71 BPM | WEIGHT: 285 LBS

## 2024-01-24 LAB — GLUCOSE BLD-MCNC: 100 MG/DL (ref 70–99)

## 2024-01-24 PROCEDURE — 0DBL8ZX EXCISION OF TRANSVERSE COLON, VIA NATURAL OR ARTIFICIAL OPENING ENDOSCOPIC, DIAGNOSTIC: ICD-10-PCS | Performed by: INTERNAL MEDICINE

## 2024-01-24 PROCEDURE — 82962 GLUCOSE BLOOD TEST: CPT

## 2024-01-24 PROCEDURE — 99153 MOD SED SAME PHYS/QHP EA: CPT | Performed by: INTERNAL MEDICINE

## 2024-01-24 PROCEDURE — 99152 MOD SED SAME PHYS/QHP 5/>YRS: CPT | Performed by: INTERNAL MEDICINE

## 2024-01-24 PROCEDURE — 0DBN8ZX EXCISION OF SIGMOID COLON, VIA NATURAL OR ARTIFICIAL OPENING ENDOSCOPIC, DIAGNOSTIC: ICD-10-PCS | Performed by: INTERNAL MEDICINE

## 2024-01-24 PROCEDURE — 88305 TISSUE EXAM BY PATHOLOGIST: CPT | Performed by: INTERNAL MEDICINE

## 2024-01-24 PROCEDURE — 0DBK8ZX EXCISION OF ASCENDING COLON, VIA NATURAL OR ARTIFICIAL OPENING ENDOSCOPIC, DIAGNOSTIC: ICD-10-PCS | Performed by: INTERNAL MEDICINE

## 2024-01-24 DEVICE — REPLAY HEMOSTASIS CLIP, 11MM SPAN
Type: IMPLANTABLE DEVICE | Site: COLON | Status: FUNCTIONAL
Brand: REPLAY

## 2024-01-24 RX ORDER — MIDAZOLAM HYDROCHLORIDE 1 MG/ML
INJECTION INTRAMUSCULAR; INTRAVENOUS
Status: DISCONTINUED | OUTPATIENT
Start: 2024-01-24 | End: 2024-01-24

## 2024-01-24 RX ORDER — SODIUM CHLORIDE, SODIUM LACTATE, POTASSIUM CHLORIDE, CALCIUM CHLORIDE 600; 310; 30; 20 MG/100ML; MG/100ML; MG/100ML; MG/100ML
INJECTION, SOLUTION INTRAVENOUS CONTINUOUS
Status: DISCONTINUED | OUTPATIENT
Start: 2024-01-24 | End: 2024-01-24

## 2024-01-24 NOTE — H&P
REFERRING PHYSICIAN: Dr. Ma ref. provider found    HPI:  Delmar Hernández is a 68 year old male. Consult requested by Dr. Francesca daniel. provider found for chief complaint of cologard positive stool.  The patient was noted to have cologard positive stool on testing dated 11/2023.  GI complaints at present include none.       Allergy:   Allergies   Allergen Reactions    Coffee Bean Extract [Coffea Arabica] OTHER (SEE COMMENTS)     Vision changes  Gives blurry vision     Current Outpatient Medications   Medication Sig Dispense Refill    HYDROcodone-acetaminophen 5-325 MG Oral Tab Take 1 tablet by mouth every 6 (six) hours as needed for Pain.      metoprolol succinate ER 25 MG Oral Tablet 24 Hr Take 1 tablet (25 mg total) by mouth daily.      Multiple Vitamins-Minerals (ICAPS AREDS FORMULA) Oral Tab Take 1 each by mouth daily.      meclizine 25 MG Oral Tab Take 1 tablet (25 mg total) by mouth 3 (three) times daily as needed for Dizziness. 20 tablet 0    GABAPENTIN 800 MG Oral Tab TAKE 1 TABLET(800 MG) BY MOUTH THREE TIMES DAILY 270 tablet 0    docusate sodium 100 MG Oral Cap Take 1 capsule (100 mg total) by mouth every morning.      Insulin Lispro, 1 Unit Dial, (ADMELOG SOLOSTAR) 100 UNIT/ML Subcutaneous Solution Pen-injector INJECT 10 UNITS UNDER THE SKIN THREE TIMES DAILY AND ONCE DAILY PER SLIDING SCALE. UP TO 51 UNITS DAILY 48 mL 1    glipiZIDE 10 MG Oral Tab Take 1 tablet (10 mg total) by mouth 2 (two) times daily before meals. 180 tablet 1    insulin glargine (BASAGLAR KWIKPEN) 100 UNIT/ML Subcutaneous Solution Pen-injector Inject 40 Units into the skin nightly. 45 mL 1    Ondansetron HCl (ZOFRAN) 4 mg tablet Take 1 tablet (4 mg total) by mouth every 6 (six) hours as needed for Nausea. 45 tablet 2    omega-3 fatty acids 1000 MG Oral Cap Take 1,000 mg by mouth daily.      L-Arginine 1000 MG Oral Tab Take 1 tablet by mouth daily.      aspirin 325 MG Oral Tab Take 1 tablet (325 mg total) by mouth daily.      ferrous sulfate  325 (65 FE) MG Oral Tab EC Take 1 tablet (325 mg total) by mouth every other day.      magnesium oxide 400 MG Oral Tab Take 1 tablet (400 mg total) by mouth every other day.      Irbesartan 300 MG Oral Tab Take 1 tablet (300 mg total) by mouth daily.      amLODIPine Besylate 5 MG Oral Tab Take 1 tablet (5 mg total) by mouth every evening.      MULTIVITAMINS OR TABS Take 1 tablet by mouth daily.      rosuvastatin 20 MG Oral Tab Take 1 tablet (20 mg total) by mouth nightly. 90 tablet 1    TRUEPLUS 5-BEVEL PEN NEEDLES 31G X 5 MM Does not apply Misc USE TO INJECT FIVE TIMES DAILY 500 each 3     Past Medical History:   Diagnosis Date    Anxiety state     Cellulitis 06/05/2018    Cellulitis and abscess of foot, except toes 01/25/2012    Charcot foot due to diabetes mellitus ([facility]) 11/14/2018    Charcot's joint disease due to secondary diabetes     Chronic osteomyelitis of left tibia with draining sinus ([facility]) 03/05/2018    Essential hypertension 08/29/2011    Exposed orthopaedic hardware ([facility]) 02/19/2018    Lt foot after Charcot joint surgery revision.  Hardware removed 2018    Heart murmur     High blood pressure     High cholesterol     Hypertriglyceridemia     Hypokalemia     Hypokalemia     Methicillin susceptible Staphylococcus aureus infection 03/13/2012    Neuropathy     Obesity, unspecified     Osteoarthrosis, unspecified whether generalized or localized, unspecified site     Osteomyelitis of ankle or foot, acute ([facility]) 02/2012    post-surgical,  at [facility]    Osteomyelitis of ankle or foot, acute, left ([facility]) 08/01/2017    OSTEOPENIA     Other and unspecified hyperlipidemia     RLS (restless legs syndrome)     Stroke ([facility])     Type II or unspecified type diabetes mellitus without mention of complication, not stated as uncontrolled     Visual impairment     reading glasses     Past Surgical History:   Procedure Laterality Date    HEMORRHOIDECTOMY      OPEN RX PATELLA FX      OPEN RX PERIARTIC FX/DISLOC ELBOW      OTHER  SURGICAL HISTORY  2011, 2012    bilateral foot surgery for Charcot joint    TONSILLECTOMY  14 y/o       Social History     Socioeconomic History    Marital status: Single   Tobacco Use    Smoking status: Never    Smokeless tobacco: Never   Vaping Use    Vaping Use: Never used   Substance and Sexual Activity    Alcohol use: No    Drug use: No     Social Determinants of Health     Food Insecurity: No Food Insecurity (12/28/2023)    Food Insecurity     Food Insecurity: Never true   Transportation Needs: No Transportation Needs (12/28/2023)    Transportation Needs     Lack of Transportation: No   Housing Stability: Low Risk  (12/28/2023)    Housing Stability     Housing Instability: No     Family History   Problem Relation Age of Onset    Heart Disease Father     Diabetes Father     Heart Disorder Mother         CHF    Pulmonary Disease Mother         COPD    Heart Surgery Mother     Breast Cancer Sister 44        stage 0, BrCA gene negative       REVIEW OF SYSTEMS:  GENERAL: feels well otherwise  SKIN: denies any unusual skin lesions  EYES: denies blurred vision or double vision  HEENT: denies nasal congestion, sinus pain or ST  LUNGS: denies shortness of breath with exertion  CARDIOVASCULAR: denies chest pain on exertion  GI: as above  : denies nocturia or changes in stream  MUSCULOSKELETAL: denies back pain  NEURO: denies headaches  PSYCHE: denies depression or anxiety  HEMATOLOGIC: denies hx of anemia  ENDOCRINE: denies thyroid history  ALL/ASTHMA: denies hx of allergy or asthma    EXAM:  Ht 6' (1.829 m)   Wt 285 lb (129.3 kg)   BMI 38.65 kg/m²   GENERAL: well developed, well nourished, in no apparent distress  SKIN: no rashes, no suspicious lesions  HEENT: atraumatic, normocephalic, ears and throat are clear  NECK: supple, no adenopathy, no bruits  CHEST: no chest tenderness  LUNGS: clear to auscultation  CARDIO: RRR without murmur  GI: good BS's and no masses, HSM or tenderness  RECTAL: Exam not  done.  MUSCULOSKELETAL: back is not tender,FROM of the back  EXTREMITIES: no cyanosis, clubbing or edema      Assessment:    Cologard positive stool        The patient was found to have cologard positive stool on recent outpt stool testing testing.  This test is a stool screening test looking for genetic mutations associated with colonic neoplasms, both benign and malignant.  The test is highly predictive for the presence of precancerous colon polyps and colon cancer.  Colonoscopy will be performed to further evaluation.    Plan:  Colonoscopy will be scheduled in the next 1 month at the patient's earliest convenience.  Risks of bleeding and perforation  were discussed.      cc. Dr Ma ref. provider found

## 2024-01-24 NOTE — PRE-SEDATION ASSESSMENT
Physician Pre-Sedation Assessment    Pre-Sedation Assessment:    Sedation History: Airway Assessed    Cardiac: normal S1, S2  Respiratory: breath sounds clear bilaterally   Abdomen: soft, BS (+), non-tender    ASA Classification: 2. Patient with mild systemic disease    Plan: IV Sedation

## 2024-01-24 NOTE — DISCHARGE INSTRUCTIONS
ENDOSCOPY DISCHARGE INSTRUCTIONS    Procedure Performed:   Colonoscopy  Endoscopist: No name on file  FINDINGS:   Colon polyp(s) (a growth in the colon)    MEDICATIONS:  You may resume all other medications today    DIET:  General    BIOPSIES:  Biopsies were taken (you will be notified of results in 7-10 days)      ADDITIONAL RECOMMENDATIONS:  Do not take aspirin or any other aspirin-like medication for 10 days after the procedure.   Recommendations regarding repeat colonoscopy will be made once the biopsy results are reviewed.      Activity for remainder of today:    REST TODAY  DO NOT drive or operate heavy machinery  DO NOT drink any alcoholic beverages  DO NOT sign any legal documents or make any important decisions    After your procedure(s):  It is not unusual to feel bloated or gassy .  Passing gas and belching is encouraged. Lying on your left side with your knees flexed may relieve the discomfort. A hot pack to the abdomen may also help.    After your gastroscopy (upper endoscopy): You may experience a slight sore throat which will subside. Throat lozenges or salt water gargle can be used.    FOLLOW-UP:  Contact the office at [phone] for follow-up appointment if needed or if you develop any of the following:    Severe abdominal pain/discomfort       Excessive bleeding or black tarry stool  Difficulty breathing or swallowing        Persistent nausea,vomiting, or a fever above 100 degrees or chills

## 2024-01-24 NOTE — BRIEF OP NOTE
Pre-Operative Diagnosis: POSITIVE COLORECTAL CANCER SCREEINING USING COLOGARD TEST, SPECIAL SCREENING FOR MALIGNANT NEOPLASMS COLON     Post-Operative Diagnosis: Polyps      Procedure Performed:   COLONOSCOPY with cold snare polypectomy x4 and endoclip x1    Surgeon(s) and Role:     * Joey Adams MD - Primary    Assistant(s):        Surgical Findings: see above     Specimen: see op note     Estimated Blood Loss: No data recorded    Dictation Number:  none    Joey Adams MD  1/24/2024  1:28 PM

## 2024-02-02 RX ORDER — AMLODIPINE BESYLATE 5 MG/1
5 TABLET ORAL EVERY EVENING
Qty: 90 TABLET | Refills: 3 | Status: SHIPPED | OUTPATIENT
Start: 2024-02-02

## 2024-02-14 ENCOUNTER — APPOINTMENT (OUTPATIENT)
Dept: CARDIOLOGY | Age: 69
End: 2024-02-14
Attending: INTERNAL MEDICINE

## 2024-02-14 ENCOUNTER — TELEPHONE (OUTPATIENT)
Dept: CARDIOLOGY | Age: 69
End: 2024-02-14

## 2024-02-14 ENCOUNTER — ANCILLARY PROCEDURE (OUTPATIENT)
Dept: CARDIOLOGY | Age: 69
End: 2024-02-14
Attending: INTERNAL MEDICINE

## 2024-02-14 VITALS — HEIGHT: 75 IN | WEIGHT: 285 LBS | BODY MASS INDEX: 35.43 KG/M2

## 2024-02-14 DIAGNOSIS — E78.5 DYSLIPIDEMIA (HIGH LDL; LOW HDL): ICD-10-CM

## 2024-02-14 DIAGNOSIS — Z79.4 TYPE 2 DIABETES MELLITUS WITHOUT COMPLICATION, WITH LONG-TERM CURRENT USE OF INSULIN (CMD): ICD-10-CM

## 2024-02-14 DIAGNOSIS — R07.9 CHEST PAIN, UNSPECIFIED TYPE: ICD-10-CM

## 2024-02-14 DIAGNOSIS — E11.9 TYPE 2 DIABETES MELLITUS WITHOUT COMPLICATION, WITH LONG-TERM CURRENT USE OF INSULIN (CMD): ICD-10-CM

## 2024-02-14 LAB
HEART RATE RESERVE PREDICTED: 66.01 BPM
LV EF: 52 %
RESTING HR ACHIEVED: 43 BPM
STRESS BASELINE BP: NORMAL MMHG
STRESS PEAK HR: 52 BPM
STRESS PERCENT HR: 34 %
STRESS POST PEAK BP: NORMAL MMHG
STRESS TARGET HR: 153 BPM

## 2024-02-14 PROCEDURE — 78452 HT MUSCLE IMAGE SPECT MULT: CPT | Performed by: INTERNAL MEDICINE

## 2024-02-14 PROCEDURE — 93015 CV STRESS TEST SUPVJ I&R: CPT | Performed by: INTERNAL MEDICINE

## 2024-02-14 PROCEDURE — A9502 TC99M TETROFOSMIN: HCPCS | Performed by: INTERNAL MEDICINE

## 2024-02-14 RX ORDER — REGADENOSON 0.08 MG/ML
0.4 INJECTION, SOLUTION INTRAVENOUS ONCE
Status: COMPLETED | OUTPATIENT
Start: 2024-02-14 | End: 2024-02-14

## 2024-02-14 RX ADMIN — REGADENOSON 0.4 MG: 0.08 INJECTION, SOLUTION INTRAVENOUS at 13:50

## 2024-02-14 ASSESSMENT — EXERCISE STRESS TEST
PEAK_BP: 100/60
PEAK_HR: 43
STOPPAGE_REASON: PROTOCOL COMPLETE
PEAK_RPP: 4100
PEAK_RPP: 5160
STAGE_CATEGORIES: RECOVERY 0
PEAK_BP: 112/58
STAGE_CATEGORIES: RECOVERY 1
PEAK_RPP: 4928
STAGE_CATEGORIES: RESTING
PEAK_BP: 120/60
STAGE_CATEGORIES: 1
PEAK_BP: 110/60
PEAK_HR: 52
PEAK_HR: 41
PEAK_RPP: 5720
PEAK_HR: 44

## 2024-02-20 ENCOUNTER — OFFICE VISIT (OUTPATIENT)
Dept: FAMILY MEDICINE CLINIC | Facility: CLINIC | Age: 69
End: 2024-02-20
Payer: MEDICAID

## 2024-02-20 VITALS
HEART RATE: 78 BPM | WEIGHT: 285 LBS | BODY MASS INDEX: 36.57 KG/M2 | DIASTOLIC BLOOD PRESSURE: 88 MMHG | TEMPERATURE: 98 F | HEIGHT: 74 IN | SYSTOLIC BLOOD PRESSURE: 150 MMHG | RESPIRATION RATE: 18 BRPM | OXYGEN SATURATION: 98 %

## 2024-02-20 DIAGNOSIS — H61.23 BILATERAL IMPACTED CERUMEN: Primary | ICD-10-CM

## 2024-02-20 PROCEDURE — 99202 OFFICE O/P NEW SF 15 MIN: CPT | Performed by: FAMILY MEDICINE

## 2024-02-20 RX ORDER — IRBESARTAN 300 MG/1
300 TABLET ORAL DAILY
Qty: 90 TABLET | Refills: 3 | Status: SHIPPED | OUTPATIENT
Start: 2024-02-20

## 2024-02-21 NOTE — PROGRESS NOTES
Delmar Hernández is a 68 year old male.    S:  Patient presents today with the following concerns:  Ear Problem (X 4-5 days  Sx: left ear clogged, hearing loss, slight pain)  Hx of ear wax buildup.  His PCP could not get him in for a couple of weeks.  Denies fevers, new nasal congestion or cough.  Does have seasonal allergies.  Feels well otherwise.      Current Outpatient Medications   Medication Sig Dispense Refill    HYDROcodone-acetaminophen 5-325 MG Oral Tab Take 1 tablet by mouth every 6 (six) hours as needed for Pain.      metoprolol succinate ER 25 MG Oral Tablet 24 Hr Take 1 tablet (25 mg total) by mouth daily.      Multiple Vitamins-Minerals (ICAPS AREDS FORMULA) Oral Tab Take 1 each by mouth daily.      meclizine 25 MG Oral Tab Take 1 tablet (25 mg total) by mouth 3 (three) times daily as needed for Dizziness. 20 tablet 0    GABAPENTIN 800 MG Oral Tab TAKE 1 TABLET(800 MG) BY MOUTH THREE TIMES DAILY 270 tablet 0    docusate sodium 100 MG Oral Cap Take 1 capsule (100 mg total) by mouth every morning.      Insulin Lispro, 1 Unit Dial, (ADMELOG SOLOSTAR) 100 UNIT/ML Subcutaneous Solution Pen-injector INJECT 10 UNITS UNDER THE SKIN THREE TIMES DAILY AND ONCE DAILY PER SLIDING SCALE. UP TO 51 UNITS DAILY 48 mL 1    glipiZIDE 10 MG Oral Tab Take 1 tablet (10 mg total) by mouth 2 (two) times daily before meals. 180 tablet 1    insulin glargine (BASAGLAR KWIKPEN) 100 UNIT/ML Subcutaneous Solution Pen-injector Inject 40 Units into the skin nightly. 45 mL 1    rosuvastatin 20 MG Oral Tab Take 1 tablet (20 mg total) by mouth nightly. 90 tablet 1    TRUEPLUS 5-BEVEL PEN NEEDLES 31G X 5 MM Does not apply Misc USE TO INJECT FIVE TIMES DAILY 500 each 3    Ondansetron HCl (ZOFRAN) 4 mg tablet Take 1 tablet (4 mg total) by mouth every 6 (six) hours as needed for Nausea. 45 tablet 2    omega-3 fatty acids 1000 MG Oral Cap Take 1,000 mg by mouth daily.      L-Arginine 1000 MG Oral Tab Take 1 tablet by mouth daily.       ferrous sulfate 325 (65 FE) MG Oral Tab EC Take 1 tablet (325 mg total) by mouth every other day.      magnesium oxide 400 MG Oral Tab Take 1 tablet (400 mg total) by mouth every other day.      Irbesartan 300 MG Oral Tab Take 1 tablet (300 mg total) by mouth daily.      amLODIPine Besylate 5 MG Oral Tab Take 1 tablet (5 mg total) by mouth every evening.      MULTIVITAMINS OR TABS Take 1 tablet by mouth daily.       Patient Active Problem List   Diagnosis    Pure hyperglyceridemia    Charcot's joint disease due to secondary diabetes (HCC)    Mixed hyperlipidemia    Hypokalemia    Diabetic peripheral neuropathy (HCC)    Essential hypertension    Non-healing ulcer of foot (HCC)    Hyponatremia    Open wound of left ankle    Open wound of heel, left, initial encounter    Diabetic foot ulcer (HCC)    Venous insufficiency (chronic) (peripheral)    Diabetic foot infection (HCC)    Type 2 diabetes mellitus with foot ulcer, with long-term current use of insulin (HCC)    Chest pain of uncertain etiology    Hyperglycemia    Hemorrhagic cerebrovascular accident (CVA) (HCC)    Nonrheumatic aortic valve stenosis    Anemia in other chronic diseases classified elsewhere    Elevated troponin    Dizziness    Epigastric pain    Chest pain, unspecified type     Family History   Problem Relation Age of Onset    Heart Disease Father     Diabetes Father     Heart Disorder Mother         CHF    Pulmonary Disease Mother         COPD    Heart Surgery Mother     Breast Cancer Sister 44        stage 0, BrCA gene negative       REVIEW OF SYSTEMS:  GENERAL: feels well otherwise  SKIN: denies any unusual skin lesions  EYES:denies vision change  LUNGS: denies shortness of breath with exertion  CARDIOVASCULAR: denies chest pain on exertion  GI: denies abdominal pain.  No N/V/D/C  : denies dysuria  MUSCULOSKELETAL: denies back pain  NEURO: denies headaches    EXAM:  /88   Pulse 78   Temp 98.1 °F (36.7 °C)   Resp 18   Ht 6' 2\" (1.88 m)    Wt 285 lb (129.3 kg)   SpO2 98%   BMI 36.59 kg/m²   GENERAL: well developed, well nourished,in no apparent distress.  Mood, affect, and behavior are normal.  SKIN: no rashes,no suspicious lesions  HEENT: atraumatic, normocephalic  Cerumen impaction bilaterally.  This is 100% removed through warm water irrigation.  Patient tolerated procedure well and feels better.  EYES:PERRLA, EOMI  NECK: supple,no adenopathy  NEURO: Oriented times three,cranial nerves are intact,motor and sensory are grossly intact    ASSESSMENT AND PLAN:  Delmar Hernández is a 68 year old male.  Encounter Diagnosis   Name Primary?    Bilateral impacted cerumen Yes       No orders of the defined types were placed in this encounter.    Meds & Refills for this Visit:  Requested Prescriptions      No prescriptions requested or ordered in this encounter     Imaging & Consults:  None    Cerumen removed entirely.  Follow up here as needed.  Patient verbalizes understanding of plan.  No follow-ups on file.

## 2024-03-07 ENCOUNTER — TELEPHONE (OUTPATIENT)
Dept: CARDIOLOGY | Age: 69
End: 2024-03-07

## 2024-03-08 ENCOUNTER — APPOINTMENT (OUTPATIENT)
Dept: CARDIOLOGY | Age: 69
End: 2024-03-08

## 2024-03-08 VITALS
SYSTOLIC BLOOD PRESSURE: 161 MMHG | DIASTOLIC BLOOD PRESSURE: 76 MMHG | HEART RATE: 67 BPM | HEIGHT: 75 IN | WEIGHT: 289 LBS | BODY MASS INDEX: 35.93 KG/M2

## 2024-03-08 DIAGNOSIS — R00.1 BRADYCARDIA, UNSPECIFIED: Primary | ICD-10-CM

## 2024-03-08 LAB
ATRIAL RATE (BPM): 65
P AXIS (DEGREES): 70
PR-INTERVAL (MSEC): 198
QRS-INTERVAL (MSEC): 146
QT-INTERVAL (MSEC): 424
QTC: 441
R AXIS (DEGREES): -37
REPORT TEXT: NORMAL
T AXIS (DEGREES): 83
VENTRICULAR RATE EKG/MIN (BPM): 65

## 2024-03-08 SDOH — HEALTH STABILITY: PHYSICAL HEALTH: ON AVERAGE, HOW MANY DAYS PER WEEK DO YOU ENGAGE IN MODERATE TO STRENUOUS EXERCISE (LIKE A BRISK WALK)?: 0 DAYS

## 2024-03-08 SDOH — HEALTH STABILITY: PHYSICAL HEALTH: ON AVERAGE, HOW MANY MINUTES DO YOU ENGAGE IN EXERCISE AT THIS LEVEL?: 0 MIN

## 2024-03-08 ASSESSMENT — PATIENT HEALTH QUESTIONNAIRE - PHQ9
SUM OF ALL RESPONSES TO PHQ9 QUESTIONS 1 AND 2: 0
2. FEELING DOWN, DEPRESSED OR HOPELESS: NOT AT ALL
SUM OF ALL RESPONSES TO PHQ9 QUESTIONS 1 AND 2: 0
1. LITTLE INTEREST OR PLEASURE IN DOING THINGS: NOT AT ALL
CLINICAL INTERPRETATION OF PHQ2 SCORE: NO FURTHER SCREENING NEEDED

## 2024-04-03 RX ORDER — ROSUVASTATIN CALCIUM 20 MG/1
20 TABLET, COATED ORAL DAILY
Qty: 90 TABLET | Refills: 3 | Status: SHIPPED | OUTPATIENT
Start: 2024-04-03

## 2024-07-08 RX ORDER — PEN NEEDLE, DIABETIC 31 GX3/16"
NEEDLE, DISPOSABLE MISCELLANEOUS
COMMUNITY
Start: 2024-03-27

## 2024-07-09 ENCOUNTER — APPOINTMENT (OUTPATIENT)
Dept: CARDIOLOGY | Age: 69
End: 2024-07-09

## 2024-07-09 VITALS
DIASTOLIC BLOOD PRESSURE: 56 MMHG | WEIGHT: 262 LBS | SYSTOLIC BLOOD PRESSURE: 109 MMHG | BODY MASS INDEX: 32.58 KG/M2 | HEART RATE: 57 BPM | HEIGHT: 75 IN

## 2024-07-09 DIAGNOSIS — I35.0 NONRHEUMATIC AORTIC VALVE STENOSIS: ICD-10-CM

## 2024-07-09 DIAGNOSIS — I10 ESSENTIAL HYPERTENSION: ICD-10-CM

## 2024-07-09 DIAGNOSIS — E78.5 DYSLIPIDEMIA (HIGH LDL; LOW HDL): ICD-10-CM

## 2024-07-09 DIAGNOSIS — E11.9 TYPE 2 DIABETES MELLITUS WITHOUT COMPLICATION, WITH LONG-TERM CURRENT USE OF INSULIN  (CMD): ICD-10-CM

## 2024-07-09 DIAGNOSIS — E78.5 DYSLIPIDEMIA: Primary | ICD-10-CM

## 2024-07-09 DIAGNOSIS — Z79.4 TYPE 2 DIABETES MELLITUS WITHOUT COMPLICATION, WITH LONG-TERM CURRENT USE OF INSULIN  (CMD): ICD-10-CM

## 2024-07-09 PROCEDURE — 3078F DIAST BP <80 MM HG: CPT | Performed by: INTERNAL MEDICINE

## 2024-07-09 PROCEDURE — 3074F SYST BP LT 130 MM HG: CPT | Performed by: INTERNAL MEDICINE

## 2024-07-09 PROCEDURE — 99214 OFFICE O/P EST MOD 30 MIN: CPT | Performed by: INTERNAL MEDICINE

## 2024-07-09 SDOH — HEALTH STABILITY: PHYSICAL HEALTH: ON AVERAGE, HOW MANY DAYS PER WEEK DO YOU ENGAGE IN MODERATE TO STRENUOUS EXERCISE (LIKE A BRISK WALK)?: 7 DAYS

## 2024-07-09 SDOH — HEALTH STABILITY: PHYSICAL HEALTH: ON AVERAGE, HOW MANY DAYS PER WEEK DO YOU ENGAGE IN MODERATE TO STRENUOUS EXERCISE (LIKE A BRISK WALK)?: 0 DAYS

## 2024-07-09 SDOH — HEALTH STABILITY: PHYSICAL HEALTH: ON AVERAGE, HOW MANY MINUTES DO YOU ENGAGE IN EXERCISE AT THIS LEVEL?: 0 MIN

## 2024-07-09 SDOH — HEALTH STABILITY: PHYSICAL HEALTH: ON AVERAGE, HOW MANY MINUTES DO YOU ENGAGE IN EXERCISE AT THIS LEVEL?: 10 MIN

## 2024-07-09 ASSESSMENT — ENCOUNTER SYMPTOMS
HEMATOCHEZIA: 0
BRUISES/BLEEDS EASILY: 0
HEMOPTYSIS: 0
SUSPICIOUS LESIONS: 0
COUGH: 0
FEVER: 0
CHILLS: 0
WEIGHT GAIN: 0
ALLERGIC/IMMUNOLOGIC COMMENTS: NO NEW FOOD ALLERGIES
WEIGHT LOSS: 0

## 2024-07-09 ASSESSMENT — PATIENT HEALTH QUESTIONNAIRE - PHQ9
2. FEELING DOWN, DEPRESSED OR HOPELESS: NOT AT ALL
CLINICAL INTERPRETATION OF PHQ2 SCORE: NO FURTHER SCREENING NEEDED
1. LITTLE INTEREST OR PLEASURE IN DOING THINGS: NOT AT ALL
SUM OF ALL RESPONSES TO PHQ9 QUESTIONS 1 AND 2: 0
SUM OF ALL RESPONSES TO PHQ9 QUESTIONS 1 AND 2: 0

## 2024-07-16 ENCOUNTER — EXTERNAL LAB (OUTPATIENT)
Dept: HEALTH INFORMATION MANAGEMENT | Age: 69
End: 2024-07-16

## 2024-07-16 LAB — TSH SERPL-ACNC: 3.94 MIU/L (ref 0.4–4.5)

## 2024-07-17 LAB
CLIENT EDUCATION TRACKING-QUEST: NORMAL
TSH SERPL-ACNC: 3.94 MIU/L (ref 0.4–4.5)

## 2024-11-26 ENCOUNTER — OFFICE VISIT (OUTPATIENT)
Dept: FAMILY MEDICINE CLINIC | Facility: CLINIC | Age: 69
End: 2024-11-26
Payer: MEDICAID

## 2024-11-26 VITALS
WEIGHT: 265 LBS | OXYGEN SATURATION: 99 % | TEMPERATURE: 98 F | DIASTOLIC BLOOD PRESSURE: 58 MMHG | BODY MASS INDEX: 34 KG/M2 | RESPIRATION RATE: 18 BRPM | SYSTOLIC BLOOD PRESSURE: 118 MMHG | HEART RATE: 52 BPM

## 2024-11-26 DIAGNOSIS — R19.7 DIARRHEA, UNSPECIFIED TYPE: ICD-10-CM

## 2024-11-26 DIAGNOSIS — J06.9 VIRAL URI: Primary | ICD-10-CM

## 2024-11-26 PROCEDURE — 87637 SARSCOV2&INF A&B&RSV AMP PRB: CPT | Performed by: NURSE PRACTITIONER

## 2024-11-26 PROCEDURE — 99213 OFFICE O/P EST LOW 20 MIN: CPT | Performed by: NURSE PRACTITIONER

## 2024-11-26 RX ORDER — IRBESARTAN 300 MG/1
300 TABLET ORAL DAILY
Qty: 90 TABLET | Refills: 3 | Status: SHIPPED | OUTPATIENT
Start: 2024-11-26

## 2024-11-27 LAB
FLUAV + FLUBV RNA SPEC NAA+PROBE: NOT DETECTED
FLUAV + FLUBV RNA SPEC NAA+PROBE: NOT DETECTED
RSV RNA SPEC NAA+PROBE: NOT DETECTED
SARS-COV-2 RNA RESP QL NAA+PROBE: NOT DETECTED

## 2024-11-27 NOTE — PATIENT INSTRUCTIONS
Covid/FLU/RSV sent to lab   Cough drops   Tylenol for discomfort     Probiotics as box instructions  Oral rehydration like Pedialyte or Gatorade for electrolytes    Plenty of fluids  Avoid greasy spicy foods  May try BRAT diet: bananas, plain rice, apple sauce, toast.    Slowly increase foods but avoid greasy / spicy    Sac diet slowly increase to General diet as improving     Check with GI trial to make sure not a side effect of medication      Follow-up with PCP if symptoms continue

## 2024-11-27 NOTE — PROGRESS NOTES
CHIEF COMPLAINT:     Chief Complaint   Patient presents with    Diarrhea     for a few days with some coughing - Entered by patient x since sat.              HPI:   Delmar Hernández is a 69 year old male who presents for upper respiratory symptoms for 3-4 days. Patient reports  diarrhea for a few days .  Associated symptoms include cough, runny nose, achy muscles at the beginning, a little tired.   Denies fevers, abdominal pain, nausea or vomiting.       Symptoms have been persistent since onset.  Treating symptoms with Pepto-bismol and cough drops.      No COVID exposure.  He has 6 Covid vaccines   Home Covid test negative on first day.   He is in a GI trial for 2 weeks and unable to receive updated Covid/FLU vaccines at this time.   He just got back from [place]         Current Outpatient Medications   Medication Sig Dispense Refill    HYDROcodone-acetaminophen 5-325 MG Oral Tab Take 1 tablet by mouth every 6 (six) hours as needed for Pain.      Multiple Vitamins-Minerals (ICAPS AREDS FORMULA) Oral Tab Take 1 each by mouth daily.      GABAPENTIN 800 MG Oral Tab TAKE 1 TABLET(800 MG) BY MOUTH THREE TIMES DAILY 270 tablet 0    docusate sodium 100 MG Oral Cap Take 1 capsule (100 mg total) by mouth every morning.      Insulin Lispro, 1 Unit Dial, (ADMELOG SOLOSTAR) 100 UNIT/ML Subcutaneous Solution Pen-injector INJECT 10 UNITS UNDER THE SKIN THREE TIMES DAILY AND ONCE DAILY PER SLIDING SCALE. UP TO 51 UNITS DAILY 48 mL 1    insulin glargine (BASAGLAR KWIKPEN) 100 UNIT/ML Subcutaneous Solution Pen-injector Inject 40 Units into the skin nightly. 45 mL 1    rosuvastatin 20 MG Oral Tab Take 1 tablet (20 mg total) by mouth nightly. 90 tablet 1    TRUEPLUS 5-BEVEL PEN NEEDLES 31G X 5 MM Does not apply Misc USE TO INJECT FIVE TIMES DAILY 500 each 3    ferrous sulfate 325 (65 FE) MG Oral Tab EC Take 1 tablet (325 mg total) by mouth every other day.      magnesium oxide 400 MG Oral Tab Take 1 tablet (400 mg total) by mouth  every other day.      Irbesartan 300 MG Oral Tab Take 1 tablet (300 mg total) by mouth daily.      amLODIPine Besylate 5 MG Oral Tab Take 1 tablet (5 mg total) by mouth every evening.      MULTIVITAMINS OR TABS Take 1 tablet by mouth daily.      metoprolol succinate ER 25 MG Oral Tablet 24 Hr Take 1 tablet (25 mg total) by mouth daily.      meclizine 25 MG Oral Tab Take 1 tablet (25 mg total) by mouth 3 (three) times daily as needed for Dizziness. 20 tablet 0    glipiZIDE 10 MG Oral Tab Take 1 tablet (10 mg total) by mouth 2 (two) times daily before meals. (Patient not taking: Reported on 11/26/2024) 180 tablet 1    Ondansetron HCl (ZOFRAN) 4 mg tablet Take 1 tablet (4 mg total) by mouth every 6 (six) hours as needed for Nausea. 45 tablet 2    omega-3 fatty acids 1000 MG Oral Cap Take 1,000 mg by mouth daily. (Patient not taking: Reported on 11/26/2024)      L-Arginine 1000 MG Oral Tab Take 1 tablet by mouth daily. (Patient not taking: Reported on 11/26/2024)        Past Medical History:    Anxiety state    Cellulitis    Cellulitis and abscess of foot, except toes    Charcot foot due to diabetes mellitus (HCC)    Charcot's joint disease due to secondary diabetes    Chronic osteomyelitis of left tibia with draining sinus (HCC)    Essential hypertension    Exposed orthopaedic hardware (HCC)    Lt foot after Charcot joint surgery revision.  Hardware removed 2018    Heart murmur    High blood pressure    High cholesterol    Hypertriglyceridemia    Hypokalemia    Hypokalemia    Methicillin susceptible Staphylococcus aureus infection    Neuropathy    Obesity, unspecified    Osteoarthrosis, unspecified whether generalized or localized, unspecified site    Osteomyelitis of ankle or foot, acute (HCC)    post-surgical,  at [place]    Osteomyelitis of ankle or foot, acute, left (HCC)    OSTEOPENIA    Other and unspecified hyperlipidemia    RLS (restless legs syndrome)    Stroke ([facility])    Type II or unspecified type diabetes  mellitus without mention of complication, not stated as uncontrolled    Visual impairment    reading glasses      Past Surgical History:   Procedure Laterality Date    Colonoscopy N/A 1/24/2024    Procedure: COLONOSCOPY with cold snare polypectomy x4 and endoclip x1;  Surgeon: Joey Adams MD;  Location:  ENDOSCOPY    Hemorrhoidectomy      Open rx patella fx      Open rx periartic fx/disloc elbow      Other surgical history  2011, 2012    bilateral foot surgery for Charcot joint    Tonsillectomy  12 y/o         Social History     Socioeconomic History    Marital status: Single   Tobacco Use    Smoking status: Never    Smokeless tobacco: Never   Vaping Use    Vaping status: Never Used   Substance and Sexual Activity    Alcohol use: No    Drug use: No     Social Drivers of Health     Food Insecurity: No Food Insecurity (12/28/2023)    Food Insecurity     Food Insecurity: Never true   Transportation Needs: No Transportation Needs (12/28/2023)    Transportation Needs     Lack of Transportation: No   Physical Activity: Medium Risk (7/9/2024)    Received from [employer]    Exercise Vital Sign     On average, how many days per week do you engage in moderate to strenuous exercise (like a brisk walk)?: 7 days     On average, how many minutes do you engage in exercise at this level?: 10 min    Received from [employer], [employer]    Stress   Housing Stability: Low Risk  (12/28/2023)    Housing Stability     Housing Instability: No         REVIEW OF SYSTEMS:   GENERAL: feels well otherwise,   Decreased appetite  SKIN: no rashes or abnormal skin lesions  HEENT: See HPI  LUNGS: denies shortness of breath or wheezing, See HPI  CARDIOVASCULAR: denies chest pain or palpitations   GI: denies N/V/C or abdominal pain  NEURO: Denies headaches    EXAM:   /58   Pulse 52   Temp 98.3 °F (36.8 °C) (Oral)   Resp 18   Wt 265 lb (120.2 kg)   SpO2 99%   BMI 34.02 kg/m²   GENERAL: well  developed, well nourished, in no apparent distress  SKIN: no rashes,no suspicious lesions  HEAD: atraumatic, normocephalic.  No tenderness on palpation of sinuses  EYES: conjunctiva clear  EARS: TM's clear, no bulging, no retraction, no fluid, bony landmarks visualized. Mild cerumen.   NOSE: Nostrils patent, clear nasal discharge, nasal mucosa pink and not inflamed  THROAT: Oral mucosa pink, moist. Posterior pharynx is not erythematous.  No exudates. Tonsils 1/4.    NECK: Supple, non-tender  LUNGS: clear to auscultation bilaterally; good air movement.  Breathing is non labored.  CARDIO: RRR without murmur  GI: active BS's x4, no masses, hepatosplenomegaly, or tenderness on direct palpation  EXTREMITIES: no cyanosis, clubbing or edema  LYMPH:  No cervical lymphadenopathy.        ASSESSMENT AND PLAN:   Delmar Hernández is a 69 year old male who presents with     ASSESSMENT:   Encounter Diagnoses   Name Primary?    Viral URI Yes    Diarrhea, unspecified type        PLAN:     1. Viral URI  OTC Meds as needed.  Risks, benefits, and side effects of medication explained and discussed.  Will sent Covid/FLU/RSV sent to lab  Discussed likely viral etiology. Reviewed symptom relief measures with patient.   To f/u with PCP if sx do not resolve as anticipated.    - SARS-CoV-2/Flu A and B/RSV by PCR ([facility]) [E]; Future  - SARS-CoV-2/Flu A and B/RSV by PCR ([facility]) [E]    2. Diarrhea, unspecified type  Patient states that he is in a GI trial, advised patient to call and ask if diarrhea is a side effect of the medication he was given.    May use probiotics  Oral rehydration fluids   BRAT diet discussed with patient.    Avoid greasy spicy foods. Instructions as listed below.    The patient is asked to f/u with PCP if symptoms worsen or if no improvement in 2-3 days.  Discussed s/s that require ER visit.           Meds & Refills for this Visit:  Requested Prescriptions      No prescriptions requested or ordered in this encounter            Patient Instructions   Covid/FLU/RSV sent to lab   Cough drops   Tylenol for discomfort     Probiotics as box instructions  Oral rehydration like Pedialyte or Gatorade for electrolytes    Plenty of fluids  Avoid greasy spicy foods  May try BRAT diet: bananas, plain rice, apple sauce, toast.    Slowly increase foods but avoid greasy / spicy    [place] diet slowly increase to General diet as improving     Check with GI trial to make sure not a side effect of medication      Follow-up with PCP if symptoms continue          The patient indicates understanding of these issues and agrees to the plan.

## 2024-12-03 ENCOUNTER — APPOINTMENT (OUTPATIENT)
Dept: CARDIOLOGY | Age: 69
End: 2024-12-03

## 2024-12-08 ENCOUNTER — OFFICE VISIT (OUTPATIENT)
Dept: FAMILY MEDICINE CLINIC | Facility: CLINIC | Age: 69
End: 2024-12-08
Payer: MEDICAID

## 2024-12-08 VITALS
RESPIRATION RATE: 18 BRPM | BODY MASS INDEX: 34 KG/M2 | HEART RATE: 54 BPM | DIASTOLIC BLOOD PRESSURE: 52 MMHG | TEMPERATURE: 98 F | WEIGHT: 266 LBS | OXYGEN SATURATION: 98 % | SYSTOLIC BLOOD PRESSURE: 120 MMHG

## 2024-12-08 DIAGNOSIS — Z76.0 ENCOUNTER FOR MEDICATION REFILL: ICD-10-CM

## 2024-12-08 DIAGNOSIS — E11.42 DIABETIC PERIPHERAL NEUROPATHY (HCC): Primary | ICD-10-CM

## 2024-12-08 PROCEDURE — 99213 OFFICE O/P EST LOW 20 MIN: CPT

## 2024-12-08 RX ORDER — GABAPENTIN 800 MG/1
800 TABLET ORAL 3 TIMES DAILY
Qty: 90 TABLET | Refills: 0 | Status: ON HOLD | OUTPATIENT
Start: 2024-12-08 | End: 2025-01-07

## 2024-12-08 NOTE — PROGRESS NOTES
Subjective:   Patient ID: Delmar Hernández is a 69 year old male.    Patient presents to clinic requesting refill of gabapentin that he takes for his diabetic neuropathy of bilateral feet. Reports that he ran out of the medication on Thursday 12/05. Requested refill from PCP and he has not heard from them. Has been taking norco for the pain but is having upset stomach from the medication and would like to have gabapentin refilled.         History/Other:   Review of Systems   All other systems reviewed and are negative.    Current Outpatient Medications   Medication Sig Dispense Refill    gabapentin 800 MG Oral Tab Take 1 tablet (800 mg total) by mouth 3 (three) times daily. 90 tablet 0    Dulaglutide (TRULICITY) 0.75 MG/0.5ML Subcutaneous Solution Pen-injector Inject 0.75 mg into the skin.      HYDROcodone-acetaminophen 5-325 MG Oral Tab Take 1 tablet by mouth every 6 (six) hours as needed for Pain.      metoprolol succinate ER 25 MG Oral Tablet 24 Hr Take 1 tablet (25 mg total) by mouth daily.      Multiple Vitamins-Minerals (ICAPS AREDS FORMULA) Oral Tab Take 1 each by mouth daily.      meclizine 25 MG Oral Tab Take 1 tablet (25 mg total) by mouth 3 (three) times daily as needed for Dizziness. 20 tablet 0    docusate sodium 100 MG Oral Cap Take 1 capsule (100 mg total) by mouth every morning.      Insulin Lispro, 1 Unit Dial, (ADMELOG SOLOSTAR) 100 UNIT/ML Subcutaneous Solution Pen-injector INJECT 10 UNITS UNDER THE SKIN THREE TIMES DAILY AND ONCE DAILY PER SLIDING SCALE. UP TO 51 UNITS DAILY 48 mL 1    glipiZIDE 10 MG Oral Tab Take 1 tablet (10 mg total) by mouth 2 (two) times daily before meals. 180 tablet 1    insulin glargine (BASAGLAR KWIKPEN) 100 UNIT/ML Subcutaneous Solution Pen-injector Inject 40 Units into the skin nightly. 45 mL 1    rosuvastatin 20 MG Oral Tab Take 1 tablet (20 mg total) by mouth nightly. 90 tablet 1    TRUEPLUS 5-BEVEL PEN NEEDLES 31G X 5 MM Does not apply Misc USE TO INJECT FIVE TIMES  DAILY 500 each 3    Ondansetron HCl (ZOFRAN) 4 mg tablet Take 1 tablet (4 mg total) by mouth every 6 (six) hours as needed for Nausea. 45 tablet 2    omega-3 fatty acids 1000 MG Oral Cap Take 1,000 mg by mouth daily.      L-Arginine 1000 MG Oral Tab Take 1 tablet by mouth daily.      ferrous sulfate 325 (65 FE) MG Oral Tab EC Take 1 tablet (325 mg total) by mouth every other day.      magnesium oxide 400 MG Oral Tab Take 1 tablet (400 mg total) by mouth every other day.      Irbesartan 300 MG Oral Tab Take 1 tablet (300 mg total) by mouth daily.      amLODIPine Besylate 5 MG Oral Tab Take 1 tablet (5 mg total) by mouth every evening.      MULTIVITAMINS OR TABS Take 1 tablet by mouth daily.      GABAPENTIN 800 MG Oral Tab TAKE 1 TABLET(800 MG) BY MOUTH THREE TIMES DAILY (Patient not taking: Reported on 12/8/2024) 270 tablet 0     Allergies:Allergies[1]    Objective:   Physical Exam  Vitals reviewed.   Constitutional:       General: He is not in acute distress.     Appearance: Normal appearance. He is not ill-appearing or toxic-appearing.   HENT:      Head: Normocephalic and atraumatic.   Cardiovascular:      Rate and Rhythm: Normal rate and regular rhythm.      Pulses: Normal pulses.      Heart sounds: Normal heart sounds.   Pulmonary:      Effort: Pulmonary effort is normal. No respiratory distress.      Breath sounds: Normal breath sounds. No wheezing, rhonchi or rales.   Musculoskeletal:         General: Normal range of motion.      Cervical back: Normal range of motion and neck supple.   Skin:     General: Skin is warm and dry.      Capillary Refill: Capillary refill takes less than 2 seconds.   Neurological:      General: No focal deficit present.      Mental Status: He is alert and oriented to person, place, and time.   Psychiatric:         Mood and Affect: Mood normal.         Behavior: Behavior normal.         Assessment & Plan:   1. Diabetic peripheral neuropathy (HCC)    2. Encounter for medication refill       Gave courtesy 1 month refill to allow patient to get into PCP. Follow up with PCP      Meds This Visit:  Requested Prescriptions     Signed Prescriptions Disp Refills    gabapentin 800 MG Oral Tab 90 tablet 0     Sig: Take 1 tablet (800 mg total) by mouth 3 (three) times daily.       Imaging & Referrals:  None         [1]   Allergies  Allergen Reactions    Coffee Bean Extract [Coffea Arabica] OTHER (SEE COMMENTS)     Vision changes  Gives blurry vision

## 2024-12-09 ENCOUNTER — APPOINTMENT (OUTPATIENT)
Dept: GENERAL RADIOLOGY | Facility: HOSPITAL | Age: 69
End: 2024-12-09
Attending: STUDENT IN AN ORGANIZED HEALTH CARE EDUCATION/TRAINING PROGRAM
Payer: MEDICAID

## 2024-12-09 ENCOUNTER — HOSPITAL ENCOUNTER (INPATIENT)
Facility: HOSPITAL | Age: 69
LOS: 1 days | Discharge: HOME | End: 2024-12-10
Attending: STUDENT IN AN ORGANIZED HEALTH CARE EDUCATION/TRAINING PROGRAM | Admitting: STUDENT IN AN ORGANIZED HEALTH CARE EDUCATION/TRAINING PROGRAM
Payer: MEDICAID

## 2024-12-09 DIAGNOSIS — R00.1 SYMPTOMATIC BRADYCARDIA: Primary | ICD-10-CM

## 2024-12-09 LAB
ALBUMIN SERPL-MCNC: 4.6 G/DL (ref 3.2–4.8)
ALBUMIN/GLOB SERPL: 1.2 {RATIO} (ref 1–2)
ALP LIVER SERPL-CCNC: 98 U/L
ALT SERPL-CCNC: 49 U/L
ANION GAP SERPL CALC-SCNC: 6 MMOL/L (ref 0–18)
AST SERPL-CCNC: 38 U/L (ref ?–34)
BASOPHILS # BLD AUTO: 0.06 X10(3) UL (ref 0–0.2)
BASOPHILS NFR BLD AUTO: 0.4 %
BILIRUB SERPL-MCNC: 0.8 MG/DL (ref 0.2–1.1)
BUN BLD-MCNC: 17 MG/DL (ref 9–23)
CALCIUM BLD-MCNC: 10.7 MG/DL (ref 8.7–10.4)
CHLORIDE SERPL-SCNC: 105 MMOL/L (ref 98–112)
CO2 SERPL-SCNC: 27 MMOL/L (ref 21–32)
CREAT BLD-MCNC: 1.25 MG/DL
EGFRCR SERPLBLD CKD-EPI 2021: 63 ML/MIN/1.73M2 (ref 60–?)
EOSINOPHIL # BLD AUTO: 0.1 X10(3) UL (ref 0–0.7)
EOSINOPHIL NFR BLD AUTO: 0.7 %
ERYTHROCYTE [DISTWIDTH] IN BLOOD BY AUTOMATED COUNT: 14.9 %
EST. AVERAGE GLUCOSE BLD GHB EST-MCNC: 117 MG/DL (ref 68–126)
GLOBULIN PLAS-MCNC: 3.7 G/DL (ref 2–3.5)
GLUCOSE BLD-MCNC: 101 MG/DL (ref 70–99)
GLUCOSE BLD-MCNC: 165 MG/DL (ref 70–99)
HBA1C MFR BLD: 5.7 % (ref ?–5.7)
HCT VFR BLD AUTO: 43 %
HCT VFR BLD AUTO: 44.1 %
HGB BLD-MCNC: 14.1 G/DL
HGB BLD-MCNC: 14.4 G/DL
IMM GRANULOCYTES # BLD AUTO: 0.06 X10(3) UL (ref 0–1)
IMM GRANULOCYTES NFR BLD: 0.4 %
LACTATE SERPL-SCNC: 1.6 MMOL/L (ref 0.5–2)
LYMPHOCYTES # BLD AUTO: 1.04 X10(3) UL (ref 1–4)
LYMPHOCYTES NFR BLD AUTO: 7.6 %
MCH RBC QN AUTO: 28.2 PG (ref 26–34)
MCHC RBC AUTO-ENTMCNC: 32.7 G/DL (ref 31–37)
MCV RBC AUTO: 86.3 FL
MONOCYTES # BLD AUTO: 0.79 X10(3) UL (ref 0.1–1)
MONOCYTES NFR BLD AUTO: 5.7 %
NEUTROPHILS # BLD AUTO: 11.71 X10 (3) UL (ref 1.5–7.7)
NEUTROPHILS # BLD AUTO: 11.71 X10(3) UL (ref 1.5–7.7)
NEUTROPHILS NFR BLD AUTO: 85.2 %
OSMOLALITY SERPL CALC.SUM OF ELEC: 288 MOSM/KG (ref 275–295)
PLATELET # BLD AUTO: 226 10(3)UL (ref 150–450)
POTASSIUM SERPL-SCNC: 4.5 MMOL/L (ref 3.5–5.1)
PROT SERPL-MCNC: 8.3 G/DL (ref 5.7–8.2)
RBC # BLD AUTO: 5.11 X10(6)UL
SODIUM SERPL-SCNC: 138 MMOL/L (ref 136–145)
TROPONIN I SERPL HS-MCNC: 29 NG/L
WBC # BLD AUTO: 13.8 X10(3) UL (ref 4–11)

## 2024-12-09 PROCEDURE — 74018 RADEX ABDOMEN 1 VIEW: CPT | Performed by: STUDENT IN AN ORGANIZED HEALTH CARE EDUCATION/TRAINING PROGRAM

## 2024-12-09 PROCEDURE — 85025 COMPLETE CBC W/AUTO DIFF WBC: CPT | Performed by: STUDENT IN AN ORGANIZED HEALTH CARE EDUCATION/TRAINING PROGRAM

## 2024-12-09 PROCEDURE — 87040 BLOOD CULTURE FOR BACTERIA: CPT | Performed by: STUDENT IN AN ORGANIZED HEALTH CARE EDUCATION/TRAINING PROGRAM

## 2024-12-09 PROCEDURE — 96372 THER/PROPH/DIAG INJ SC/IM: CPT

## 2024-12-09 PROCEDURE — 96375 TX/PRO/DX INJ NEW DRUG ADDON: CPT

## 2024-12-09 PROCEDURE — 85018 HEMOGLOBIN: CPT | Performed by: STUDENT IN AN ORGANIZED HEALTH CARE EDUCATION/TRAINING PROGRAM

## 2024-12-09 PROCEDURE — 36415 COLL VENOUS BLD VENIPUNCTURE: CPT

## 2024-12-09 PROCEDURE — 85014 HEMATOCRIT: CPT | Performed by: STUDENT IN AN ORGANIZED HEALTH CARE EDUCATION/TRAINING PROGRAM

## 2024-12-09 PROCEDURE — 93005 ELECTROCARDIOGRAM TRACING: CPT

## 2024-12-09 PROCEDURE — 71045 X-RAY EXAM CHEST 1 VIEW: CPT | Performed by: STUDENT IN AN ORGANIZED HEALTH CARE EDUCATION/TRAINING PROGRAM

## 2024-12-09 PROCEDURE — 84484 ASSAY OF TROPONIN QUANT: CPT | Performed by: STUDENT IN AN ORGANIZED HEALTH CARE EDUCATION/TRAINING PROGRAM

## 2024-12-09 PROCEDURE — 93010 ELECTROCARDIOGRAM REPORT: CPT

## 2024-12-09 PROCEDURE — 99285 EMERGENCY DEPT VISIT HI MDM: CPT

## 2024-12-09 PROCEDURE — 80053 COMPREHEN METABOLIC PANEL: CPT | Performed by: STUDENT IN AN ORGANIZED HEALTH CARE EDUCATION/TRAINING PROGRAM

## 2024-12-09 PROCEDURE — 99291 CRITICAL CARE FIRST HOUR: CPT

## 2024-12-09 PROCEDURE — 83605 ASSAY OF LACTIC ACID: CPT | Performed by: STUDENT IN AN ORGANIZED HEALTH CARE EDUCATION/TRAINING PROGRAM

## 2024-12-09 PROCEDURE — 73630 X-RAY EXAM OF FOOT: CPT | Performed by: STUDENT IN AN ORGANIZED HEALTH CARE EDUCATION/TRAINING PROGRAM

## 2024-12-09 PROCEDURE — 82962 GLUCOSE BLOOD TEST: CPT

## 2024-12-09 PROCEDURE — 83036 HEMOGLOBIN GLYCOSYLATED A1C: CPT | Performed by: HOSPITALIST

## 2024-12-09 PROCEDURE — 96374 THER/PROPH/DIAG INJ IV PUSH: CPT

## 2024-12-09 RX ORDER — METOCLOPRAMIDE HYDROCHLORIDE 5 MG/ML
10 INJECTION INTRAMUSCULAR; INTRAVENOUS EVERY 8 HOURS PRN
Status: DISCONTINUED | OUTPATIENT
Start: 2024-12-09 | End: 2024-12-10

## 2024-12-09 RX ORDER — MAGNESIUM OXIDE 400 MG/1
400 TABLET ORAL EVERY OTHER DAY
Status: DISCONTINUED | OUTPATIENT
Start: 2024-12-10 | End: 2024-12-10

## 2024-12-09 RX ORDER — LOSARTAN POTASSIUM 100 MG/1
100 TABLET ORAL DAILY
Status: DISCONTINUED | OUTPATIENT
Start: 2024-12-10 | End: 2024-12-10

## 2024-12-09 RX ORDER — ASPIRIN 325 MG
325 TABLET ORAL DAILY
Status: DISCONTINUED | OUTPATIENT
Start: 2024-12-10 | End: 2024-12-10

## 2024-12-09 RX ORDER — FERROUS SULFATE 325(65) MG
325 TABLET, DELAYED RELEASE (ENTERIC COATED) ORAL EVERY OTHER DAY
Status: DISCONTINUED | OUTPATIENT
Start: 2024-12-11 | End: 2024-12-10

## 2024-12-09 RX ORDER — INSULIN DEGLUDEC 100 U/ML
40 INJECTION, SOLUTION SUBCUTANEOUS NIGHTLY
Status: DISCONTINUED | OUTPATIENT
Start: 2024-12-09 | End: 2024-12-10

## 2024-12-09 RX ORDER — BISACODYL 10 MG
10 SUPPOSITORY, RECTAL RECTAL
Status: DISCONTINUED | OUTPATIENT
Start: 2024-12-09 | End: 2024-12-10

## 2024-12-09 RX ORDER — METRONIDAZOLE 500 MG/100ML
500 INJECTION, SOLUTION INTRAVENOUS EVERY 12 HOURS
Status: DISCONTINUED | OUTPATIENT
Start: 2024-12-09 | End: 2024-12-10

## 2024-12-09 RX ORDER — ASPIRIN 325 MG
325 TABLET ORAL DAILY
COMMUNITY

## 2024-12-09 RX ORDER — MECLIZINE HYDROCHLORIDE 25 MG/1
25 TABLET ORAL 3 TIMES DAILY PRN
Status: DISCONTINUED | OUTPATIENT
Start: 2024-12-09 | End: 2024-12-10

## 2024-12-09 RX ORDER — GABAPENTIN 400 MG/1
800 CAPSULE ORAL 3 TIMES DAILY
Status: DISCONTINUED | OUTPATIENT
Start: 2024-12-09 | End: 2024-12-10

## 2024-12-09 RX ORDER — ENOXAPARIN SODIUM 100 MG/ML
40 INJECTION SUBCUTANEOUS DAILY
Status: DISCONTINUED | OUTPATIENT
Start: 2024-12-10 | End: 2024-12-10

## 2024-12-09 RX ORDER — DOCUSATE SODIUM 100 MG/1
100 CAPSULE, LIQUID FILLED ORAL EVERY MORNING
Status: DISCONTINUED | OUTPATIENT
Start: 2024-12-10 | End: 2024-12-10

## 2024-12-09 RX ORDER — SENNOSIDES 8.6 MG
17.2 TABLET ORAL NIGHTLY PRN
Status: DISCONTINUED | OUTPATIENT
Start: 2024-12-09 | End: 2024-12-10

## 2024-12-09 RX ORDER — ONDANSETRON 2 MG/ML
4 INJECTION INTRAMUSCULAR; INTRAVENOUS EVERY 6 HOURS PRN
Status: DISCONTINUED | OUTPATIENT
Start: 2024-12-09 | End: 2024-12-10

## 2024-12-09 RX ORDER — ONDANSETRON 4 MG/1
4 TABLET, FILM COATED ORAL EVERY 6 HOURS PRN
Status: DISCONTINUED | OUTPATIENT
Start: 2024-12-09 | End: 2024-12-10

## 2024-12-09 RX ORDER — NICOTINE POLACRILEX 4 MG
15 LOZENGE BUCCAL
Status: DISCONTINUED | OUTPATIENT
Start: 2024-12-09 | End: 2024-12-10

## 2024-12-09 RX ORDER — INSULIN LISPRO 100 [IU]/ML
10 INJECTION, SOLUTION INTRAVENOUS; SUBCUTANEOUS 3 TIMES DAILY
Status: DISCONTINUED | OUTPATIENT
Start: 2024-12-09 | End: 2024-12-09

## 2024-12-09 RX ORDER — HYDROCODONE BITARTRATE AND ACETAMINOPHEN 5; 325 MG/1; MG/1
2 TABLET ORAL EVERY 4 HOURS PRN
Status: DISCONTINUED | OUTPATIENT
Start: 2024-12-09 | End: 2024-12-10

## 2024-12-09 RX ORDER — ROSUVASTATIN CALCIUM 20 MG/1
20 TABLET, COATED ORAL NIGHTLY
Status: DISCONTINUED | OUTPATIENT
Start: 2024-12-09 | End: 2024-12-10

## 2024-12-09 RX ORDER — DULAGLUTIDE 1.5 MG/.5ML
1.5 INJECTION, SOLUTION SUBCUTANEOUS WEEKLY
COMMUNITY
Start: 2024-11-21

## 2024-12-09 RX ORDER — DEXTROSE MONOHYDRATE 25 G/50ML
50 INJECTION, SOLUTION INTRAVENOUS
Status: DISCONTINUED | OUTPATIENT
Start: 2024-12-09 | End: 2024-12-10

## 2024-12-09 RX ORDER — ACETAMINOPHEN 325 MG/1
650 TABLET ORAL EVERY 4 HOURS PRN
Status: DISCONTINUED | OUTPATIENT
Start: 2024-12-09 | End: 2024-12-10

## 2024-12-09 RX ORDER — POLYETHYLENE GLYCOL 3350 17 G/17G
17 POWDER, FOR SOLUTION ORAL DAILY PRN
Status: DISCONTINUED | OUTPATIENT
Start: 2024-12-09 | End: 2024-12-10

## 2024-12-09 RX ORDER — METRONIDAZOLE 500 MG/100ML
500 INJECTION, SOLUTION INTRAVENOUS EVERY 8 HOURS
Status: DISCONTINUED | OUTPATIENT
Start: 2024-12-09 | End: 2024-12-09

## 2024-12-09 RX ORDER — AMLODIPINE BESYLATE 5 MG/1
5 TABLET ORAL EVERY EVENING
Status: DISCONTINUED | OUTPATIENT
Start: 2024-12-09 | End: 2024-12-10

## 2024-12-09 RX ORDER — NICOTINE POLACRILEX 4 MG
30 LOZENGE BUCCAL
Status: DISCONTINUED | OUTPATIENT
Start: 2024-12-09 | End: 2024-12-10

## 2024-12-09 RX ORDER — HYDROCODONE BITARTRATE AND ACETAMINOPHEN 5; 325 MG/1; MG/1
1 TABLET ORAL EVERY 4 HOURS PRN
Status: DISCONTINUED | OUTPATIENT
Start: 2024-12-09 | End: 2024-12-10

## 2024-12-09 RX ORDER — ACETAMINOPHEN 500 MG
500 TABLET ORAL EVERY 4 HOURS PRN
Status: DISCONTINUED | OUTPATIENT
Start: 2024-12-09 | End: 2024-12-10

## 2024-12-09 NOTE — ED QUICK NOTES
After multiple solid BM, blood tinge noted in the stool. ER MD notified, rectal exam was negative. Repeat H&H were drawn and sent.

## 2024-12-09 NOTE — CONSULTS
[facility]  Cardiology Consultation    Delmar Hernández Patient Status:  Emergency    1955 MRN [id number]   [facility] EMERGENCY DEPARTMENT Attending Zoie Stahl MD   Hosp Day # 0 PCP Randall Almendarez MD     Reason for Consultation:  Bradycardia    History of Present Illness:  Delmar Hernández is a a(n) 69 year old male with history of diabetes, Charcot foot deformity, hypertension, hyperlipidemia, presents in the hospital with complaints of dizziness and nausea.  Patient states that he ran out of his gabapentin and started taking hydrocodone for pain and states he felt constipated ever since.  He has not had a bowel movement in 2 days.  He states has been trying and since has become dizzy.  He presented to the hospital his heart rate was in the mid 30s.  EKG showed marked sinus bradycardia.    History:  Past Medical History:    Anxiety state    Cellulitis    Cellulitis and abscess of foot, except toes    Charcot foot due to diabetes mellitus (HCC)    Charcot's joint disease due to secondary diabetes    Chronic osteomyelitis of left tibia with draining sinus (HCC)    Essential hypertension    Exposed orthopaedic hardware (HCC)    Lt foot after Charcot joint surgery revision.  Hardware removed     Heart murmur    High blood pressure    High cholesterol    Hypertriglyceridemia    Hypokalemia    Hypokalemia    Methicillin susceptible Staphylococcus aureus infection    Neuropathy    Obesity, unspecified    Osteoarthrosis, unspecified whether generalized or localized, unspecified site    Osteomyelitis of ankle or foot, acute (HCC)    post-surgical,  at [place]    Osteomyelitis of ankle or foot, acute, left (HCC)    OSTEOPENIA    Other and unspecified hyperlipidemia    RLS (restless legs syndrome)    Stroke ([facility])    Type II or unspecified type diabetes mellitus without mention of complication, not stated as uncontrolled    Visual impairment    reading glasses     Past Surgical History:   Procedure  Laterality Date    Colonoscopy N/A 2024    Procedure: COLONOSCOPY with cold snare polypectomy x4 and endoclip x1;  Surgeon: Joey Adams MD;  Location:  ENDOSCOPY    Hemorrhoidectomy      Open rx patella fx      Open rx periartic fx/disloc elbow      Other surgical history  ,     bilateral foot surgery for Charcot joint    Tonsillectomy  14 y/o     Family History   Problem Relation Age of Onset    Heart Disease Father     Diabetes Father     Heart Disorder Mother         CHF    Pulmonary Disease Mother         COPD    Heart Surgery Mother     Breast Cancer Sister 44        stage 0, BrCA gene negative      reports that he has never smoked. He has never used smokeless tobacco. He reports that he does not drink alcohol and does not use drugs.    Allergies:  Allergies[1]    Medications:  No current facility-administered medications for this encounter.    Review of Systems:  A comprehensive review of systems was negative if not otherwise mention in above HPI.    /83   Pulse 67   Temp 97.5 °F (36.4 °C) (Oral)   Resp 17   Ht 6' 2\" (1.88 m)   Wt 255 lb (115.7 kg)   SpO2 99%   BMI 32.74 kg/m²   Temp (24hrs), Av.5 °F (36.4 °C), Min:97.5 °F (36.4 °C), Max:97.5 °F (36.4 °C)     No intake or output data in the 24 hours ending 24 1459  Wt Readings from Last 3 Encounters:   24 255 lb (115.7 kg)   24 266 lb (120.7 kg)   24 265 lb (120.2 kg)       Physical Exam:   General: Alert and oriented x 3. No apparent distress. No respiratory or constitutional distress.  HEENT: Normocephalic, anicteric sclera, neck supple.  Neck: No JVD, carotids 2+, no bruits.  Cardiac: Regular rate and rhythm. S1, S2 normal. No murmur, pericardial rub, S3.  Lungs: Clear without wheezes, rales, rhonchi or dullness.  Normal excursions and effort.  Abdomen: Soft, non-tender. BS-present.  Extremities: Without clubbing, cyanosis or edema.  Peripheral pulses are 2+.  Neurologic: Alert and oriented,  normal affect.  Skin: Warm and dry.     Laboratory Data:       ImaginL EKG:  marked sinus bradycardia  Impression:  Active Problems:    * No active hospital problems. *  Sinus Bradycardia symptomatic likely multifactoria  Chronic Pain  Constipation      Recommendations:  Continue to hold AVN blocking agents  Stool softeners  Avoid narcotics if possible for pain  Cardiac monitoring overnight  Atropine as needed for symptomatic bradycardia  Possible discharge tomorrow if stable    Thank you for allowing me to participate in the care of your patient.    Mirella Lin MD  2024  2:59 PM         [1]   Allergies  Allergen Reactions    Coffee Bean Extract [Coffea Arabica] OTHER (SEE COMMENTS)     Vision changes  Gives blurry vision

## 2024-12-09 NOTE — ED QUICK NOTES
Heart rate on monitor stable 60's-70s for last 20 minutes. Patient reports decrease in nausea. Only complaint at this time is the urge to have a bowel movement. Patient refuses to be taken off of bedpan at this time as he feels he will be able to go shortly. RN educated patient on risks of prolonged sitting on bedpan including risk to skin. Patient verbalizes understanding.

## 2024-12-09 NOTE — ED PROVIDER NOTES
Patient Seen in: [facility] Emergency Department      History     Chief Complaint   Patient presents with    Nausea    Arrythmia/Palpitations     Stated Complaint: nausea/vomiting, bradycardia    Subjective:   HPI      The patient is a 69-year-old male history hypertension diabetes presented to the ER with bradycardia.  He states he was attempted to have a bowel movement when he started feeling lightheaded and dizzy and asked his sister to call 911.  He is on metoprolol which she took earlier today.  He denies any chest pain or shortness of breath.  He notes he has pain in his lower abdomen he notes chronic issues with constipation.     Objective:     Past Medical History:    Anxiety state    Cellulitis    Cellulitis and abscess of foot, except toes    Charcot foot due to diabetes mellitus (HCC)    Charcot's joint disease due to secondary diabetes    Chronic osteomyelitis of left tibia with draining sinus (HCC)    Essential hypertension    Exposed orthopaedic hardware (HCC)    Lt foot after Charcot joint surgery revision.  Hardware removed 2018    Heart murmur    High blood pressure    High cholesterol    Hypertriglyceridemia    Hypokalemia    Hypokalemia    Methicillin susceptible Staphylococcus aureus infection    Neuropathy    Obesity, unspecified    Osteoarthrosis, unspecified whether generalized or localized, unspecified site    Osteomyelitis of ankle or foot, acute (HCC)    post-surgical,  at [facility]    Osteomyelitis of ankle or foot, acute, left (HCC)    OSTEOPENIA    Other and unspecified hyperlipidemia    RLS (restless legs syndrome)    Stroke ([facility])    Type II or unspecified type diabetes mellitus without mention of complication, not stated as uncontrolled    Visual impairment    reading glasses              Past Surgical History:   Procedure Laterality Date    Colonoscopy N/A 1/24/2024    Procedure: COLONOSCOPY with cold snare polypectomy x4 and endoclip x1;  Surgeon: Joey Adams MD;  Location:  [facility] ENDOSCOPY    Hemorrhoidectomy      Open rx patella fx      Open rx periartic fx/disloc elbow      Other surgical history  2011, 2012    bilateral foot surgery for Charcot joint    Tonsillectomy  14 y/o                Social History     Socioeconomic History    Marital status: Single   Tobacco Use    Smoking status: Never    Smokeless tobacco: Never   Vaping Use    Vaping status: Never Used   Substance and Sexual Activity    Alcohol use: No    Drug use: No     Social Drivers of Health     Food Insecurity: No Food Insecurity (12/9/2024)    Food Insecurity     Food Insecurity: Never true   Transportation Needs: No Transportation Needs (12/9/2024)    Transportation Needs     Lack of Transportation: No   Physical Activity: Medium Risk (7/9/2024)    Received from [employer]    Exercise Vital Sign     On average, how many days per week do you engage in moderate to strenuous exercise (like a brisk walk)?: 7 days     On average, how many minutes do you engage in exercise at this level?: 10 min    Received from [employer], [employer]    Stress   Housing Stability: Low Risk  (12/9/2024)    Housing Stability     Housing Instability: No                  Physical Exam     ED Triage Vitals   BP 12/09/24 1343 114/45   Pulse 12/09/24 1343 (!) 38   Resp 12/09/24 1343 20   Temp 12/09/24 1359 97.5 °F (36.4 °C)   Temp src 12/09/24 1359 Oral   SpO2 12/09/24 1343 100 %   O2 Device 12/09/24 1340 None (Room air)       Current Vitals:   Vital Signs  BP: 98/58  Pulse: 81  Resp: 19  Temp: 98.4 °F (36.9 °C)  Temp src: Oral  MAP (mmHg): 68    Oxygen Therapy  SpO2: 96 %  O2 Device: None (Room air)  Pulse Oximetry Type: Continuous  Oximetry Probe Site Changed: No  Pulse Ox Probe Location: Left hand        Physical Exam  Vitals and nursing note reviewed.   Constitutional:       General: He is not in acute distress.     Appearance: Normal appearance.   HENT:      Head: Normocephalic.      Nose: Nose normal.       Mouth/Throat:      Mouth: Mucous membranes are moist.   Eyes:      Extraocular Movements: Extraocular movements intact.      Pupils: Pupils are equal, round, and reactive to light.   Cardiovascular:      Rate and Rhythm: Regular rhythm. Bradycardia present.      Pulses: Normal pulses.   Pulmonary:      Effort: Pulmonary effort is normal.   Abdominal:      General: Abdomen is flat. Bowel sounds are normal. There is no distension.      Palpations: Abdomen is soft.      Tenderness: There is no abdominal tenderness. There is no right CVA tenderness, left CVA tenderness, guarding or rebound.      Hernia: No hernia is present.   Musculoskeletal:         General: No swelling or tenderness. Normal range of motion.      Cervical back: Normal range of motion.      Comments: Left foot with diabetic wound    Skin:     General: Skin is warm and dry.   Neurological:      Mental Status: He is alert and oriented to person, place, and time. Mental status is at baseline.   Psychiatric:         Mood and Affect: Mood normal.             ED Course     Labs Reviewed   CBC WITH DIFFERENTIAL WITH PLATELET - Abnormal; Notable for the following components:       Result Value    WBC 13.8 (*)     Neutrophil Absolute Prelim 11.71 (*)     Neutrophil Absolute 11.71 (*)     All other components within normal limits   COMP METABOLIC PANEL (14) - Abnormal; Notable for the following components:    Glucose 101 (*)     Calcium, Total 10.7 (*)     AST 38 (*)     Total Protein 8.3 (*)     Globulin  3.7 (*)     All other components within normal limits   HEMOGLOBIN A1C - Abnormal; Notable for the following components:    HgbA1C 5.7 (*)     All other components within normal limits   BASIC METABOLIC PANEL (8) - Abnormal; Notable for the following components:    Glucose 192 (*)     Sodium 135 (*)     BUN 31 (*)     Creatinine 1.40 (*)     eGFR-Cr 55 (*)     All other components within normal limits   CBC WITH DIFFERENTIAL WITH PLATELET - Abnormal; Notable  for the following components:    WBC 18.1 (*)     Neutrophil Absolute Prelim 15.36 (*)     Neutrophil Absolute 15.36 (*)     Lymphocyte Absolute 0.76 (*)     Monocyte Absolute 1.60 (*)     All other components within normal limits   POCT GLUCOSE - Abnormal; Notable for the following components:    POC Glucose 165 (*)     All other components within normal limits   POCT GLUCOSE - Abnormal; Notable for the following components:    POC Glucose 171 (*)     All other components within normal limits   POCT GLUCOSE - Abnormal; Notable for the following components:    POC Glucose 192 (*)     All other components within normal limits   TROPONIN I HIGH SENSITIVITY - Normal   LACTIC ACID, PLASMA - Normal   HEMOGLOBIN + HEMATOCRIT - Normal   TSH W REFLEX TO FREE T4 - Normal   RAINBOW DRAW LAVENDER   RAINBOW DRAW LIGHT GREEN   RAINBOW DRAW BLUE   RAINBOW DRAW GOLD   BLOOD CULTURE   BLOOD CULTURE     EKG    Rate, intervals and axes as noted on EKG Report.  Rate: 38  Rhythm: Sinus Rhythm  Reading: no grupo, non specific st changes                 XR CHEST AP PORTABLE  (CPT=71045)    Result Date: 12/9/2024  CONCLUSION:  See above.   LOCATION:  [facility]      Dictated by (CST): Stromberg, LeRoy, MD on 12/09/2024 at 4:38 PM     Finalized by (CST): Stromberg, LeRoy, MD on 12/09/2024 at 4:39 PM       XR FOOT, COMPLETE (MIN 3 VIEWS), LEFT (CPT=73630)    Result Date: 12/9/2024  CONCLUSION:  See above.   LOCATION:  [facility]   Dictated by (CST): Stromberg, LeRoy, MD on 12/09/2024 at 4:33 PM     Finalized by (CST): Stromberg, LeRoy, MD on 12/09/2024 at 4:37 PM       XR ABDOMEN (1 VIEW) (CPT=74018)    Result Date: 12/9/2024  CONCLUSION:  Nonspecific bowel gas pattern.   LOCATION:  [facility]   Dictated by (CST): Stromberg, LeRoy, MD on 12/09/2024 at 4:31 PM     Finalized by (CST): Stromberg, LeRoy, MD on 12/09/2024 at 4:33 PM            MDM          Admission disposition: 12/9/2024  4:13 PM           Medical Decision Making  The differential includes  the following  Vasovagal episode, cardiac arrhythmia, metabolic dysfunction, sespsis 2/2 diabetic foot wound, acs    Pertinent comorbidities include  As listed above     Pertinent social history includes  As listed above     Labs  WBC 13.8, lactic acid 1.6   Trop 29    Imaging studies  I reviewed the images and my independent interpreation after review is no evidence of sbo on kub. Additionaly, I reviewd the radiology report that states the following non specific bowel gas pattern        ER course  Patient on arrival was bradycardic to the low 30s and diaphoretic and complaining of lightheadedness. SBP also 90s.  Patient started on fluids EKG with sinus bradycardia with no signs of ischemia or AV block.  Patient was given a dose of 0.5 mg of atropine.  Heart rate improved.  [facility] consulted and patient evaluated by them.  U he will be monitored overnight mentality but no urgent pacemaker will be placed at this time.  Nclear if patient had a vagal event or he could be suffering from sepsis given diabetic foot wound however vitals overall showed improvement.  Patient given empiric antibiotics after blood cultures and lactic acid were drawn.  Patient did have a bowel movement and felt abdominal relief.  Patient has bright red blood covered on stool. No active bleeding on consented digital rectal exam. Pt admitted and endorsed to [facility].     A total of 35 minutes of critical care time (exclusive of billable procedures) was administered to manage the patient's unstable vital signs due to his symptomatic bradycardia.  This involved direct patient intervention, complex decision making, and/or extensive discussions with the patient, family, and clinical staff.      Disposition and Plan     Clinical Impression:  1. Symptomatic bradycardia         Disposition:  Admit  12/9/2024  4:13 pm    Follow-up:  No follow-up provider specified.        Medications Prescribed:  Current Discharge Medication List               Supplementary Documentation:         Hospital Problems       Present on Admission  Date Reviewed: 12/8/2024            ICD-10-CM Noted POA    * (Principal) Symptomatic bradycardia R00.1 12/9/2024 Unknown

## 2024-12-09 NOTE — ED QUICK NOTES
Orders for admission, patient is aware of plan and ready to go upstairs. Any questions, please call ED CINTHYA Garcia at extension [phone].     Patient Covid vaccination status: Fully vaccinated     COVID Test Ordered in ED: None    COVID Suspicion at Admission: N/A    Running Infusions:  None    Mental Status/LOC at time of transport: [place]    Other pertinent information:   CIWA score: N/A   NIH score:  N/A

## 2024-12-10 VITALS
HEART RATE: 81 BPM | OXYGEN SATURATION: 96 % | RESPIRATION RATE: 19 BRPM | DIASTOLIC BLOOD PRESSURE: 58 MMHG | WEIGHT: 255 LBS | SYSTOLIC BLOOD PRESSURE: 98 MMHG | BODY MASS INDEX: 32.73 KG/M2 | TEMPERATURE: 98 F | HEIGHT: 74 IN

## 2024-12-10 LAB
ANION GAP SERPL CALC-SCNC: 7 MMOL/L (ref 0–18)
BASOPHILS # BLD AUTO: 0.03 X10(3) UL (ref 0–0.2)
BASOPHILS NFR BLD AUTO: 0.2 %
BUN BLD-MCNC: 31 MG/DL (ref 9–23)
CALCIUM BLD-MCNC: 9.4 MG/DL (ref 8.7–10.4)
CHLORIDE SERPL-SCNC: 102 MMOL/L (ref 98–112)
CO2 SERPL-SCNC: 26 MMOL/L (ref 21–32)
CREAT BLD-MCNC: 1.4 MG/DL
EGFRCR SERPLBLD CKD-EPI 2021: 55 ML/MIN/1.73M2 (ref 60–?)
EOSINOPHIL # BLD AUTO: 0.2 X10(3) UL (ref 0–0.7)
EOSINOPHIL NFR BLD AUTO: 1.1 %
ERYTHROCYTE [DISTWIDTH] IN BLOOD BY AUTOMATED COUNT: 15.2 %
GLUCOSE BLD-MCNC: 171 MG/DL (ref 70–99)
GLUCOSE BLD-MCNC: 192 MG/DL (ref 70–99)
GLUCOSE BLD-MCNC: 192 MG/DL (ref 70–99)
HCT VFR BLD AUTO: 39.4 %
HGB BLD-MCNC: 13.6 G/DL
IMM GRANULOCYTES # BLD AUTO: 0.1 X10(3) UL (ref 0–1)
IMM GRANULOCYTES NFR BLD: 0.6 %
LYMPHOCYTES # BLD AUTO: 0.76 X10(3) UL (ref 1–4)
LYMPHOCYTES NFR BLD AUTO: 4.2 %
MCH RBC QN AUTO: 29.2 PG (ref 26–34)
MCHC RBC AUTO-ENTMCNC: 34.5 G/DL (ref 31–37)
MCV RBC AUTO: 84.7 FL
MONOCYTES # BLD AUTO: 1.6 X10(3) UL (ref 0.1–1)
MONOCYTES NFR BLD AUTO: 8.9 %
NEUTROPHILS # BLD AUTO: 15.36 X10 (3) UL (ref 1.5–7.7)
NEUTROPHILS # BLD AUTO: 15.36 X10(3) UL (ref 1.5–7.7)
NEUTROPHILS NFR BLD AUTO: 85 %
OSMOLALITY SERPL CALC.SUM OF ELEC: 292 MOSM/KG (ref 275–295)
PLATELET # BLD AUTO: 223 10(3)UL (ref 150–450)
POTASSIUM SERPL-SCNC: 4.6 MMOL/L (ref 3.5–5.1)
RBC # BLD AUTO: 4.65 X10(6)UL
SODIUM SERPL-SCNC: 135 MMOL/L (ref 136–145)
TSI SER-ACNC: 0.99 UIU/ML (ref 0.55–4.78)
WBC # BLD AUTO: 18.1 X10(3) UL (ref 4–11)

## 2024-12-10 PROCEDURE — 97165 OT EVAL LOW COMPLEX 30 MIN: CPT

## 2024-12-10 PROCEDURE — 97535 SELF CARE MNGMENT TRAINING: CPT

## 2024-12-10 PROCEDURE — 99213 OFFICE O/P EST LOW 20 MIN: CPT

## 2024-12-10 PROCEDURE — 82962 GLUCOSE BLOOD TEST: CPT

## 2024-12-10 PROCEDURE — 80048 BASIC METABOLIC PNL TOTAL CA: CPT | Performed by: HOSPITALIST

## 2024-12-10 PROCEDURE — 85025 COMPLETE CBC W/AUTO DIFF WBC: CPT | Performed by: HOSPITALIST

## 2024-12-10 PROCEDURE — 84443 ASSAY THYROID STIM HORMONE: CPT | Performed by: PHYSICIAN ASSISTANT

## 2024-12-10 RX ORDER — VANCOMYCIN 1.75 GRAM/500 ML IN 0.9 % SODIUM CHLORIDE INTRAVENOUS
1750
Status: DISCONTINUED | OUTPATIENT
Start: 2024-12-10 | End: 2024-12-10

## 2024-12-10 RX ORDER — METRONIDAZOLE 500 MG/1
500 TABLET ORAL EVERY 12 HOURS SCHEDULED
Status: DISCONTINUED | OUTPATIENT
Start: 2024-12-10 | End: 2024-12-10

## 2024-12-10 RX ORDER — VANCOMYCIN 1.75 GRAM/500 ML IN 0.9 % SODIUM CHLORIDE INTRAVENOUS
15
Status: DISCONTINUED | OUTPATIENT
Start: 2024-12-10 | End: 2024-12-10

## 2024-12-10 NOTE — PROGRESS NOTES
Progress Note  Delmar Hernández Patient Status:  Observation    1955 MRN [id number]   Location [facility] 2NE-A Attending Mohit Lamb MD   Hosp Day # 0 PCP Randall Almendarez MD     Subjective:  Reports feeling improved. No dizziness, syncope, chest pain, dyspnea.    Objective:  Physical Exam:   /55 (BP Location: Right arm)   Pulse 67   Temp 98.6 °F (37 °C) (Oral)   Resp 18   Ht 6' 2\" (1.88 m)   Wt 255 lb (115.7 kg)   SpO2 96%   BMI 32.74 kg/m²   Temp (24hrs), Av.8 °F (37.1 °C), Min:97.5 °F (36.4 °C), Max:100.6 °F (38.1 °C)       Intake/Output Summary (Last 24 hours) at 12/10/2024 1021  Last data filed at 12/10/2024 0525  Gross per 24 hour   Intake 120 ml   Output --   Net 120 ml     Wt Readings from Last 3 Encounters:   24 255 lb (115.7 kg)   24 266 lb (120.7 kg)   24 265 lb (120.2 kg)     Telemetry: NSR 60's  General: Alert and oriented in no apparent distress.  HEENT: No focal deficits.  Neck: No JVD, carotids 2+ no bruits.  Cardiac: Regular rate and rhythm, S1, S2 normal, 1/6 systolic murmur, rub or gallop.  Lungs: Clear without wheezes, rales, rhonchi or dullness.  Normal excursions and effort.  Abdomen: Soft, non-tender.   Extremities: Without clubbing, cyanosis or edema.  Peripheral pulses are 2+. Chronic changes consistent with charcot foot  Neurologic: Alert and oriented, normal affect.  Skin: Warm and dry.        Intake/Output:    Intake/Output Summary (Last 24 hours) at 12/10/2024 1021  Last data filed at 12/10/2024 0525  Gross per 24 hour   Intake 120 ml   Output --   Net 120 ml       Last 3 Weights   24 1343 255 lb (115.7 kg)   24 1324 266 lb (120.7 kg)   24 1840 265 lb (120.2 kg)       Labs:  Recent Labs   Lab 24  1427 12/10/24  0545   * 192*   BUN 17 31*   CREATSERUM 1.25 1.40*   EGFRCR 63 55*   CA 10.7* 9.4    135*   K 4.5 4.6    102   CO2 27.0 26.0     Recent Labs   Lab 24  1427 24  1714  12/10/24  0545   RBC 5.11  --  4.65   HGB 14.4 14.1 13.6   HCT 44.1 43.0 39.4   MCV 86.3  --  84.7   MCH 28.2  --  29.2   MCHC 32.7  --  34.5   RDW 14.9  --  15.2   NEPRELIM 11.71*  --  15.36*   WBC 13.8*  --  18.1*   .0  --  223.0         Recent Labs   Lab 12/09/24  1427   TROPHS 29       Diagnostics:           Medications:   cefepime  1 g Intravenous Q12H    vancomycin  15 mg/kg Intravenous Q18H    amLODIPine  5 mg Oral QPM    aspirin  325 mg Oral Daily    docusate sodium  100 mg Oral QAM    [START ON 12/11/2024] ferrous sulfate  325 mg Oral QOD    gabapentin  800 mg Oral TID    insulin degludec  40 Units Subcutaneous Nightly    losartan  100 mg Oral Daily    magnesium oxide  400 mg Oral QOD    rosuvastatin  20 mg Oral Nightly    [Held by provider] enoxaparin  40 mg Subcutaneous Daily    insulin aspart  1-5 Units Subcutaneous TID AC and HS    insulin aspart  10 Units Subcutaneous TID CC    metroNIDAZOLE  500 mg Intravenous Q12H         Assessment/Plan:    Sinus bradycardia with history of symptomatic bradycardia on AV blockade  Episode of HR in the 30's while constipated with dizziness while straining for a BM  Avoid AV blockade  No further symptomatic symptoms or significant pauses/bradycardia on telemetry since admission  Abnormal ECG  Outside office visit 7/2024 reports recent normal stress testing  Chronic pain  Charcot foot w/ chronic lower extremity ulcer  DM II  HTN  BP at goal  HLD  Statin  Acute GI bleeding no on OAC  Defer, consider discontinuation of outpatient ASA  Hx of prior hemorrhagic CVA  HX of mild aortic stenosis  Outpatient echocardiogram and follow up with his cardiologist is already scheduled    PLAN  Add a TSH to labs from this AM  Follow up is scheduled with his outpatient cardiologist within two weeks  He denies further symptoms and will monitor his HR at home, we will likely follow peripherally from a cardiology perspective, but will review with the attending  cardiologist.      Feliciano Meraz PA-C  12/10/2024  10:21 AM     Patient seen and examined by me today.    Gen: NAD. Resting comfortably  HEENT: JVP not distended  Lungs: CTAB. No wheezes or crackles  Cardiac: RRR, normal S1/S2, no m/r/g  Abd: soft, NT, ND  Ext: +dusky skin bilateral feet with venous stasis changes R foot  Neuro: No focal deficits    I have personally performed the medical decision making in its entirety. My additions include:    69yoM with h/o DM, HTN, chonic osteo with L foot wounds p/w LGIB and sinus bradycardia/dizziness when straining to have a BM.    - Currently NSR without dizziness  - Sinus taras likely vagally mediated in setting of straining and GIB.   - No AV Meaghan blocking agents  - TSH wnl  - Pt has TTE scheduled in 2 days and will be following up with his cardiologist  - No further workup from cardiac perspective. Cardiology will sign off.    Please call with questions.    Wilder De Leon MD  Cardiac Electrophysiology  [facility] [place]

## 2024-12-10 NOTE — PLAN OF CARE
Assumed care of patient at 0730. Patient is a/ox4. On RA. Denies any chest pain or shortness of breath. On tele-NSR. Continent- urinal at bedside. BM today; mary jo-MD is aware, GI consult in. Activity wise upx1 with a walker. Call light in reach. Patient updated on POC.       Problem: CARDIOVASCULAR - ADULT  Goal: Maintains optimal cardiac output and hemodynamic stability  Description: INTERVENTIONS:  - Monitor vital signs, rhythm, and trends  - Monitor for bleeding, hypotension and signs of decreased cardiac output  - Evaluate effectiveness of vasoactive medications to optimize hemodynamic stability  - Monitor arterial and/or venous puncture sites for bleeding and/or hematoma  - Assess quality of pulses, skin color and temperature  - Assess for signs of decreased coronary artery perfusion - ex. Angina  - Evaluate fluid balance, assess for edema, trend weights  Outcome: Not Progressing

## 2024-12-10 NOTE — DISCHARGE INSTRUCTIONS
May ambulate with his CROW boot     Wound Cleaning and Dressings:   Wound cleansing:  Cleanse with saline   Wound product: Gently pack with silver aquacel.Leave a wick.Cover with bordered foam   Dressing change frequency:  Change dressing daily and/or as needed

## 2024-12-10 NOTE — CONSULTS
Delmar Hernández  4/9/1955  [id number]      PODIATRY, FOOT AND SURGERY CONSULTATION NOTE    REASON FOR CONSULTATION:  left heel wound    CONSULTING PHYSICIAN: Mohit Lamb MD    HPI: Delmar Hernández is a 69 year old  with PMH significant for h/o of charcot to the left foot, type 2 diabetes, HTN, Cronic osteomyelitis, currently admitted for lower GI bleed.  Denies any changes in the left foot coloration, pain.  Has no pain, redness, or swelling to the left foot and heel.  The patient was admitted for lower GI bleed. See Dr. Corey at [place] for his charcot foot.  Has a CROW walker that was adjusted in early November,      Past Medical History:    Anxiety state    Cellulitis    Cellulitis and abscess of foot, except toes    Charcot foot due to diabetes mellitus (formerly [facility])    Charcot's joint disease due to secondary diabetes    Chronic osteomyelitis of left tibia with draining sinus (formerly [facility])    Essential hypertension    Exposed orthopaedic hardware (formerly [facility])    Lt foot after Charcot joint surgery revision.  Hardware removed 2018    Heart murmur    High blood pressure    High cholesterol    Hypertriglyceridemia    Hypokalemia    Hypokalemia    Methicillin susceptible Staphylococcus aureus infection    Neuropathy    Obesity, unspecified    Osteoarthrosis, unspecified whether generalized or localized, unspecified site    Osteomyelitis of ankle or foot, acute (formerly [facility])    post-surgical,  at [place]    Osteomyelitis of ankle or foot, acute, left (formerly [facility])    OSTEOPENIA    Other and unspecified hyperlipidemia    RLS (restless legs syndrome)    Stroke (formerly [facility])    Type II or unspecified type diabetes mellitus without mention of complication, not stated as uncontrolled    Visual impairment    reading glasses       Patient Active Problem List   Diagnosis    Pure hyperglyceridemia    Charcot's joint disease due to secondary diabetes (formerly [facility])    Mixed hyperlipidemia    Hypokalemia    Diabetic peripheral neuropathy (formerly [facility])    Essential hypertension     Non-healing ulcer of foot (HCC)    Hyponatremia    Open wound of left ankle    Open wound of heel, left, initial encounter    Diabetic foot ulcer (HCC)    Venous insufficiency (chronic) (peripheral)    Diabetic foot infection (HCC)    Type 2 diabetes mellitus with foot ulcer, with long-term current use of insulin (HCC)    Chest pain of uncertain etiology    Hyperglycemia    Hemorrhagic cerebrovascular accident (CVA) (HCC)    Nonrheumatic aortic valve stenosis    Anemia in other chronic diseases classified elsewhere    Elevated troponin    Dizziness    Epigastric pain    Chest pain, unspecified type    Symptomatic bradycardia       Past Surgical History:   Procedure Laterality Date    Colonoscopy N/A 1/24/2024    Procedure: COLONOSCOPY with cold snare polypectomy x4 and endoclip x1;  Surgeon: Joey Adams MD;  Location:  ENDOSCOPY    Hemorrhoidectomy      Open rx patella fx      Open rx periartic fx/disloc elbow      Other surgical history  2011, 2012    bilateral foot surgery for Charcot joint    Tonsillectomy  14 y/o       Scheduled Meds:   amLODIPine  5 mg Oral QPM    aspirin  325 mg Oral Daily    docusate sodium  100 mg Oral QAM    [START ON 12/11/2024] ferrous sulfate  325 mg Oral QOD    gabapentin  800 mg Oral TID    insulin degludec  40 Units Subcutaneous Nightly    losartan  100 mg Oral Daily    magnesium oxide  400 mg Oral QOD    rosuvastatin  20 mg Oral Nightly    [Held by provider] enoxaparin  40 mg Subcutaneous Daily    insulin aspart  1-5 Units Subcutaneous TID AC and HS    insulin aspart  10 Units Subcutaneous TID CC     Continuous Infusions:  PRN Meds:.  meclizine    ondansetron    glucose **OR** glucose **OR** glucose-vitamin C **OR** dextrose **OR** glucose **OR** glucose **OR** glucose-vitamin C    acetaminophen    acetaminophen **OR** HYDROcodone-acetaminophen **OR** HYDROcodone-acetaminophen    melatonin    ondansetron    metoclopramide    polyethylene glycol (PEG 3350)    sennosides     bisacodyl    Allergies[1]    Social History     Socioeconomic History    Marital status: Single     Spouse name: Not on file    Number of children: Not on file    Years of education: Not on file    Highest education level: Not on file   Occupational History    Not on file   Tobacco Use    Smoking status: Never    Smokeless tobacco: Never   Vaping Use    Vaping status: Never Used   Substance and Sexual Activity    Alcohol use: No    Drug use: No    Sexual activity: Not on file   Other Topics Concern    Not on file   Social History Narrative    Not on file     Social Drivers of Health     Financial Resource Strain: Not on file   Food Insecurity: No Food Insecurity (12/9/2024)    Food Insecurity     Food Insecurity: Never true   Transportation Needs: No Transportation Needs (12/9/2024)    Transportation Needs     Lack of Transportation: No     Car Seat: Not on file   Physical Activity: Medium Risk (7/9/2024)    Received from [facility]    Exercise Vital Sign     On average, how many days per week do you engage in moderate to strenuous exercise (like a brisk walk)?: 7 days     On average, how many minutes do you engage in exercise at this level?: 10 min   Stress: Low Risk  (4/14/2021)    Received from [facility] [employer]    Stress     How Stressed: Not on file   Social Connections: Not on file   Housing Stability: Low Risk  (12/9/2024)    Housing Stability     Housing Instability: No     Housing Instability Emergency: Not on file     Crib or Bassinette: Not on file       A comprehensive 10 point review of systems was completed.  Pertinent positives and negatives noted in the the HPI.    O :   Vitals:    12/10/24 1205   BP: 98/58   Pulse: 81   Resp: 19   Temp: 98.4 °F (36.9 °C)       General :  AA&O x3, NAD. VSS, Afebrile.   Integument: Skin is warm and dry, moist janice. Ulceration noted to the plantar aspect of the left heel with no erythema, edema, malodor or drainage present.  HPK  rim present.  Depth to the wound. Currently packed.   Granular appearance of ulceration. Measures 0.6 x 1.6 cm No ascending cellulitis present.   No signs of trauma noted.   Vascular: DP and PT pulses are 2/4 left. CFT < 3 secs 1-5 digits left. pedal edema appreciated  Neuro: Sensation is grossly absent to light touch.  MSK:   left heel with no pain present on palpation.       Recent Labs     12/09/24  1427 12/10/24  0545   ALT 49  --    AST 38*  --    CO2 27.0 26.0    102   * 192*   K 4.5 4.6    135*   BUN 17 31*       Recent Labs     12/09/24  1427 12/09/24  1719 12/10/24  0545   WBC 13.8*  --  18.1*   HGB 14.4 14.1 13.6   HCT 44.1 43.0 39.4   MCV 86.3  --  84.7   .0  --  223.0   RBC 5.11  --  4.65       Recent Results (from the past 24 hours)   EKG 12 Lead    Collection Time: 12/09/24  1:38 PM   Result Value Ref Range    Ventricular rate 38 BPM    Atrial rate 38 BPM    P-R Interval 208 ms    QRS Duration 144 ms    Q-T Interval 470 ms    QTC Calculation (Bezet) 373 ms    P Axis 77 degrees    R Axis -36 degrees    T Axis 33 degrees   RAINBOW DRAW LAVENDER    Collection Time: 12/09/24  2:27 PM   Result Value Ref Range    Hold Lavender Auto Resulted    RAINBOW DRAW LIGHT GREEN    Collection Time: 12/09/24  2:27 PM   Result Value Ref Range    Hold Lt Green Auto Resulted    RAINBOW DRAW BLUE    Collection Time: 12/09/24  2:27 PM   Result Value Ref Range    Hold Blue Auto Resulted    RAINBOW DRAW GOLD    Collection Time: 12/09/24  2:27 PM   Result Value Ref Range    Hold Gold Auto Resulted    CBC With Differential With Platelet    Collection Time: 12/09/24  2:27 PM   Result Value Ref Range    WBC 13.8 (H) 4.0 - 11.0 x10(3) uL    RBC 5.11 3.80 - 5.80 x10(6)uL    HGB 14.4 13.0 - 17.5 g/dL    HCT 44.1 39.0 - 53.0 %    .0 150.0 - 450.0 10(3)uL    MCV 86.3 80.0 - 100.0 fL    MCH 28.2 26.0 - 34.0 pg    MCHC 32.7 31.0 - 37.0 g/dL    RDW 14.9 %    Neutrophil Absolute Prelim 11.71 (H) 1.50 - 7.70  x10 (3) uL    Neutrophil Absolute 11.71 (H) 1.50 - 7.70 x10(3) uL    Lymphocyte Absolute 1.04 1.00 - 4.00 x10(3) uL    Monocyte Absolute 0.79 0.10 - 1.00 x10(3) uL    Eosinophil Absolute 0.10 0.00 - 0.70 x10(3) uL    Basophil Absolute 0.06 0.00 - 0.20 x10(3) uL    Immature Granulocyte Absolute 0.06 0.00 - 1.00 x10(3) uL    Neutrophil % 85.2 %    Lymphocyte % 7.6 %    Monocyte % 5.7 %    Eosinophil % 0.7 %    Basophil % 0.4 %    Immature Granulocyte % 0.4 %   Comp Metabolic Panel (14)    Collection Time: 12/09/24  2:27 PM   Result Value Ref Range    Glucose 101 (H) 70 - 99 mg/dL    Sodium 138 136 - 145 mmol/L    Potassium 4.5 3.5 - 5.1 mmol/L    Chloride 105 98 - 112 mmol/L    CO2 27.0 21.0 - 32.0 mmol/L    Anion Gap 6 0 - 18 mmol/L    BUN 17 9 - 23 mg/dL    Creatinine 1.25 0.70 - 1.30 mg/dL    Calcium, Total 10.7 (H) 8.7 - 10.4 mg/dL    Calculated Osmolality 288 275 - 295 mOsm/kg    eGFR-Cr 63 >=60 mL/min/1.73m2    AST 38 (H) <34 U/L    ALT 49 10 - 49 U/L    Alkaline Phosphatase 98 45 - 117 U/L    Bilirubin, Total 0.8 0.2 - 1.1 mg/dL    Total Protein 8.3 (H) 5.7 - 8.2 g/dL    Albumin 4.6 3.2 - 4.8 g/dL    Globulin  3.7 (H) 2.0 - 3.5 g/dL    A/G Ratio 1.2 1.0 - 2.0   Troponin I (High Sensitivity)    Collection Time: 12/09/24  2:27 PM   Result Value Ref Range    Troponin I (High Sensitivity) 29 <=53 ng/L   Lactic Acid, Plasma    Collection Time: 12/09/24  2:27 PM   Result Value Ref Range    Lactic Acid 1.6 0.5 - 2.0 mmol/L   Hemoglobin & Hematocrit    Collection Time: 12/09/24  5:19 PM   Result Value Ref Range    HGB 14.1 13.0 - 17.5 g/dL    HCT 43.0 39.0 - 53.0 %   Hemoglobin A1C    Collection Time: 12/09/24  5:19 PM   Result Value Ref Range    HgbA1C 5.7 (H) <5.7 %    Estimated Average Glucose 117 68 - 126 mg/dL   POCT Glucose    Collection Time: 12/09/24  8:59 PM   Result Value Ref Range    POC Glucose 165 (H) 70 - 99 mg/dL   POCT Glucose    Collection Time: 12/10/24  5:32 AM   Result Value Ref Range    POC Glucose  171 (H) 70 - 99 mg/dL   Basic Metabolic Panel (8)    Collection Time: 12/10/24  5:45 AM   Result Value Ref Range    Glucose 192 (H) 70 - 99 mg/dL    Sodium 135 (L) 136 - 145 mmol/L    Potassium 4.6 3.5 - 5.1 mmol/L    Chloride 102 98 - 112 mmol/L    CO2 26.0 21.0 - 32.0 mmol/L    Anion Gap 7 0 - 18 mmol/L    BUN 31 (H) 9 - 23 mg/dL    Creatinine 1.40 (H) 0.70 - 1.30 mg/dL    Calcium, Total 9.4 8.7 - 10.4 mg/dL    Calculated Osmolality 292 275 - 295 mOsm/kg    eGFR-Cr 55 (L) >=60 mL/min/1.73m2   CBC With Differential With Platelet    Collection Time: 12/10/24  5:45 AM   Result Value Ref Range    WBC 18.1 (H) 4.0 - 11.0 x10(3) uL    RBC 4.65 3.80 - 5.80 x10(6)uL    HGB 13.6 13.0 - 17.5 g/dL    HCT 39.4 39.0 - 53.0 %    .0 150.0 - 450.0 10(3)uL    MCV 84.7 80.0 - 100.0 fL    MCH 29.2 26.0 - 34.0 pg    MCHC 34.5 31.0 - 37.0 g/dL    RDW 15.2 %    Neutrophil Absolute Prelim 15.36 (H) 1.50 - 7.70 x10 (3) uL    Neutrophil Absolute 15.36 (H) 1.50 - 7.70 x10(3) uL    Lymphocyte Absolute 0.76 (L) 1.00 - 4.00 x10(3) uL    Monocyte Absolute 1.60 (H) 0.10 - 1.00 x10(3) uL    Eosinophil Absolute 0.20 0.00 - 0.70 x10(3) uL    Basophil Absolute 0.03 0.00 - 0.20 x10(3) uL    Immature Granulocyte Absolute 0.10 0.00 - 1.00 x10(3) uL    Neutrophil % 85.0 %    Lymphocyte % 4.2 %    Monocyte % 8.9 %    Eosinophil % 1.1 %    Basophil % 0.2 %    Immature Granulocyte % 0.6 %   TSH W Reflex To Free T4    Collection Time: 12/10/24  5:45 AM   Result Value Ref Range    TSH 0.986 0.550 - 4.780 uIU/mL   POCT Glucose    Collection Time: 12/10/24 12:05 PM   Result Value Ref Range    POC Glucose 192 (H) 70 - 99 mg/dL       HGBA1C:    Lab Results   Component Value Date    A1C 5.7 (H) 12/09/2024    A1C 6.2 (H) 12/27/2023    A1C 6.6 (H) 08/04/2022     12/09/2024       XRAYS :    MRI :     Wound cultures :       ASSESSMENT :  Wound left foot  Charcot left foot with chronic osteomyelitis - no current infection  Type 2 diabetes    PLAN :  Pt  was seen and evaluated at bedside, reviewed chart  Radiographs reviewed in chart  Pt is afebrile with VSS  Using a #15 blade, the HPK tissue at the vickie wound area was sharply debrided.  Packing and gentle optifoam dressing was re-applied to the plantar left heel.   Continue to use the CROW walker.  Follow up with Dr. Corey for ongoing foot care.    Recommend wound care referral.   Patient was seen by ID.  No antibiotics at this time.   Pt can ambulate with his CROW boot.       Thank you for this consult.      Sharon Austin DPM  12/10/2024  12:46 PM  [phone]         [1]   Allergies  Allergen Reactions    Coffee Bean Extract [Coffea Arabica] OTHER (SEE COMMENTS)     Vision changes  Gives blurry vision

## 2024-12-10 NOTE — CONSULTS
[facility]  Report of Inpatient Wound Care Consultation    Delmar Hernández Patient Status:  Observation    1955 MRN [id number]   Location [facility] 2NE-A Attending Mohit Lamb MD   Hosp Day # 0 PCP Randall Almendarez MD     Reason for Consultation:  Left foot     History of Present Illness:  Delmar Hernández is a a(n) 69 year old male. Patient seen at bedside with a fellow  wound care nurse.Patient presents with a chronic wound to left plantar heel, hx of Diabetes.He follows up with  at [place] for foot care and treatment Denies pain at this time Patient with multiple comorbidities.      History:  Past Medical History:    Anxiety state    Cellulitis    Cellulitis and abscess of foot, except toes    Charcot foot due to diabetes mellitus (HCC)    Charcot's joint disease due to secondary diabetes    Chronic osteomyelitis of left tibia with draining sinus ([facility])    Essential hypertension    Exposed orthopaedic hardware (HCC)    Lt foot after Charcot joint surgery revision.  Hardware removed     Heart murmur    High blood pressure    High cholesterol    Hypertriglyceridemia    Hypokalemia    Hypokalemia    Methicillin susceptible Staphylococcus aureus infection    Neuropathy    Obesity, unspecified    Osteoarthrosis, unspecified whether generalized or localized, unspecified site    Osteomyelitis of ankle or foot, acute ([facility])    post-surgical,  at [place]    Osteomyelitis of ankle or foot, acute, left ([facility])    OSTEOPENIA    Other and unspecified hyperlipidemia    RLS (restless legs syndrome)    Stroke ([facility])    Type II or unspecified type diabetes mellitus without mention of complication, not stated as uncontrolled    Visual impairment    reading glasses     Past Surgical History:   Procedure Laterality Date    Colonoscopy N/A 2024    Procedure: COLONOSCOPY with cold snare polypectomy x4 and endoclip x1;  Surgeon: Joey Adams MD;  Location:  ENDOSCOPY    Hemorrhoidectomy      Open rx  patella fx      Open rx periartic fx/disloc elbow      Other surgical history  2011, 2012    bilateral foot surgery for Charcot joint    Tonsillectomy  12 y/o      reports that he has never smoked. He has never used smokeless tobacco. He reports that he does not drink alcohol and does not use drugs.      Allergies:  @ALLERGY    Laboratory Data:    Recent Labs   Lab 12/09/24  1427 12/09/24  1719 12/09/24  2059 12/10/24  0532 12/10/24  0545 12/10/24  1205   WBC 13.8*  --   --   --  18.1*  --    HGB 14.4 14.1  --   --  13.6  --    HCT 44.1 43.0  --   --  39.4  --    .0  --   --   --  223.0  --    CREATSERUM 1.25  --   --   --  1.40*  --    BUN 17  --   --   --  31*  --    *  --   --   --  192*  --    CA 10.7*  --   --   --  9.4  --    ALB 4.6  --   --   --   --   --    TP 8.3*  --   --   --   --   --    PGLU  --   --  165* 171*  --  192*         ASSESSMENT:  Wound 12/09/24 Heel Left;Plantar (Active)   Date First Assessed: 12/09/24   Present on Original Admission: Yes  Primary Wound Type: Diabetic Ulcer  Location: Heel  Wound Location Orientation: Left;Plantar      Assessments 12/10/2024 11:08 AM   Wound Image     Drainage Amount None (none at this time)   Wound Length (cm) 0.6 cm   Wound Width (cm) 1.6 cm   Wound Surface Area (cm^2) 0.96 cm^2   Wound Depth (cm) 1.6 cm   Wound Volume (cm^3) 1.536 cm^3   Margins Other (Comment) (callous)   Non-staged Wound Description Full thickness   Peggy-wound Assessment Edema   Wound Granulation Tissue Pink;Firm (Unable to completely visualize wound base)   Wound Odor None   Tunneling? No   Undermining? Yes   Number of Undermines 1   Undermine 1 Start Position 9   Undermine 1 End Position 12 (1.4 cm at 12 o'clock)   Local pulse: weak , dorsalis pedis  Capillary refill :  <3 seconds  Temperature :warm    Wound Cleaning and Dressings:  Wound cleansing:  Cleanse with saline  Wound product: Gently pack with silver aquacel.Leave a wick.Cover with bordered foam  Dressing  change frequency:  Change dressing daily and/or PRN    Additional notes:remove silver aquacel if MRI is ordered and re apply the dressing after the procedure     Compression Therapy:   Elevate leg(s) as much as possible      Miscellaneous/Additional Orders:  Offloading: Heel off-loading boot(s)/off load heels while in bed,Crow boot when ambulating      Miscellaneous orders: Increase dietary protein intake to promote wound healing.Dietitian or Attending physician may need to evaluate amount of protein intake/supplements depending on the kidney functions and other medical conditions     Care Summary:  Care Summary: Discussed Plan of Care at beside with patient. Patient verbally acknowledges understanding of all instructions and all questions were answered.    Additional notes :  Continue to follow up with  and or the outpatient wound clinic if discharged to  home.  Consult with podiatry  X ray of the left foot completed    Thank you for this consultation and for allowing me to participate in the care of your patient.      Time Spent 30 Minutes.    Thank you,  Deya Short RN BSN [facility] Wound Care Team  12/10/2024  2:48 PM

## 2024-12-10 NOTE — PROGRESS NOTES
[facility]   part of [facility]     Hospitalist Progress Note     Delmar Hernández Patient Status:  Observation    1955 MRN [id number]   Location [facility] 2NE-A Attending Mohit Lamb MD   Hosp Day # 0 PCP Randall Almendarez MD     Chief Complaint: Symptomatic bradycardia    Subjective:     Patient is doing well however despite a low-grade fever of 100.6 overnight, also had couple bowel movements that were bloody today and yesterday had bloody bowel movement as well.  No abdominal pain no nausea no vomiting    Objective:    Review of Systems:   A comprehensive review of systems was completed; pertinent positive and negatives stated in subjective.    Vital signs:  Temp:  [98.4 °F (36.9 °C)-100.6 °F (38.1 °C)] 98.4 °F (36.9 °C)  Pulse:  [52-90] 81  Resp:  [13-21] 19  BP: ()/(42-93) 98/58  SpO2:  [92 %-96 %] 96 %    Physical Exam:        Gen: No acute distress, alert and oriented x 3  Pulm: Lungs clear bilaterally, good inspiratory effort   CV:  nL S1/S2  Abd: soft, NT/ND, no hepatomegaly, +BS  MSK: moving all extremities, no edema left heel ulcer noted, and dressing  Neuro: no focal deficits  Skin: no rashes/lesions as above  Psych: normal mood/affect            Diagnostic Data:    Labs:  Recent Labs   Lab 24  1427 24  1719 12/10/24  0545   WBC 13.8*  --  18.1*   HGB 14.4 14.1 13.6   MCV 86.3  --  84.7   .0  --  223.0       Recent Labs   Lab 24  1427 12/10/24  0545   * 192*   BUN 17 31*   CREATSERUM 1.25 1.40*   CA 10.7* 9.4   ALB 4.6  --     135*   K 4.5 4.6    102   CO2 27.0 26.0   ALKPHO 98  --    AST 38*  --    ALT 49  --    BILT 0.8  --    TP 8.3*  --        Estimated Creatinine Clearance: 58.7 mL/min (A) (based on SCr of 1.4 mg/dL (H)).    Recent Labs   Lab 24  1427   TROPHS 29       No results for input(s): \"PTP\", \"INR\" in the last 168 hours.    Lab Results   Component Value Date    TSH 0.986 12/10/2024             Microbiology    No  results found for this visit on 12/09/24.      Imaging: Reviewed in Epic.    Medications:    amLODIPine  5 mg Oral QPM    aspirin  325 mg Oral Daily    docusate sodium  100 mg Oral QAM    [START ON 12/11/2024] ferrous sulfate  325 mg Oral QOD    gabapentin  800 mg Oral TID    insulin degludec  40 Units Subcutaneous Nightly    losartan  100 mg Oral Daily    magnesium oxide  400 mg Oral QOD    rosuvastatin  20 mg Oral Nightly    [Held by provider] enoxaparin  40 mg Subcutaneous Daily    insulin aspart  1-5 Units Subcutaneous TID AC and HS    insulin aspart  10 Units Subcutaneous TID CC       Assessment & Plan:          # Lightheadedness, dizziness, sinus bradycardia  Resolved likely a vasovagal episode after having strain for a bowel movement-  -EKG reviewed  -Troponin negative cardiology consulted  -Avoid beta-blockers, further workup per cardiology I do think he needs an echo however patient has a TTE scheduled as an outpatient and will follow with cardiologist  -Cardiology has currently signed off  Completely asymptomatic at this time-        # Acute lower GI bleed  -Witnessed large bowel movement mixed with blood in the bed  -Check CBC every 12, hemoglobin has been stable even though he did have 2 more episodes of bloody bowel movements today  -GI consulted, pending evaluation  History of hemorrhoids, recent colonoscopy in January 2024 that, status post polypectomy and Endo Clip placement     # Charcot foot deformity PT  # Chronic versus acute osteomyelitis-acute osteomyelitis has been ruled out  # Low-grade fevers,-sepsis without a known cause at this time  -Patient has an ulcer, follows up with podiatry at [place] consult podiatry over here, mild leukocytosis  -X-ray of the foot reviewed  -Per infectious disease no further IV antibiotics needed for the wound as wound appears to be chronic and stable, podiatry also signed off and did a local excision, no further antibiotics  -Patient is spiked fever 100.6, with  uptrending leukocytosis, will check a UA, await urine cultures and blood cultures at this time, defer antibiotics to ID  -     # Type 2 diabetes resume home insulin regimen  -Slightly elevated, resume home insulin regimen at this time, ACHS Accu-Cheks  # Hypertension   hyperlipidemia      Mohit Lamb MD    Supplementary Documentation:     Quality:  DVT Mechanical Prophylaxis:   SCDs,    DVT Pharmacologic Prophylaxis   Medication    [Held by provider] enoxaparin (Lovenox) 40 MG/0.4ML SUBQ injection 40 mg    DVT Pharmacologic prophylaxis: Aspirin 325 mg           Code Status: Full Code  Gann: No urinary catheter in place  Gann Duration (in days):   Central line:      The 21st Century Cures Act makes medical notes like these available to patients in the interest of transparency. Please be advised this is a medical document. Medical documents are intended to carry relevant information, facts as evident, and the clinical opinion of the practitioner. The medical note is intended as peer to peer communication and may appear blunt or direct. It is written in medical language and may contain abbreviations or verbiage that are unfamiliar.

## 2024-12-10 NOTE — PROGRESS NOTES
[facility] Dosing Service      Initial Pharmacokinetic Consult for Vancomycin Dosing     Delmar Hernández is a 69 year old male who is being initiated on vancomycin therapy for cellulitis.  Pharmacy has been asked to dose vancomycin by Dr Cabrales.  The initial treatment and monitoring approach will be steady state AUC strategy.        Weight and Temperature:    Wt Readings from Last 1 Encounters:   24 115.7 kg (255 lb)        Temp Readings from Last 1 Encounters:   12/10/24 98.6 °F (37 °C) (Oral)      Labs:   Recent Labs   Lab 24  1427 12/10/24  0545   CREATSERUM 1.25 1.40*      Estimated Creatinine Clearance: 58.7 mL/min (A) (based on SCr of 1.4 mg/dL (H)).     Recent Labs   Lab 24  1427 12/10/24  0545   WBC 13.8* 18.1*          The Pharmacokinetic Target is:     to 600 mg-h/L and trough <=15 mg/L    Renal Dosing Considerations:    None     Assessment/Plan:   Initial dose=Vancomycin 1750 mg (15mg/kg) IV every 18 hours      Monitorin) Plan for vancomycin peak and trough to be obtained in approximately 72 hours    2) Pharmacy will order SCr as clinically indicated to assess renal function.    3) Pharmacy will monitor for toxicity and efficacy, adjust vancomycin dose and/or frequency, and order vancomycin levels as appropriate per the Pharmacy and Therapeutics Committee approved protocol until discontinuation of the medication.       We appreciate the opportunity to assist in the care of this patient.     Diane Roque PharmD  12/10/2024  10:40 AM  [facility] Extension: [phone]

## 2024-12-10 NOTE — PLAN OF CARE
Pt is A&O x4. Reports mild abd pain. Denies SOB & cardiac symptoms.  Maintaining O2 on RA. NSR on tele, S1&S2 present.  Bowel sounds active. Continent of bowel and bladder. Multiple bloody BMs.  Diabetic ulcer on L heel. Picture in chart. Cleansed and wrapped in Kerlix.  Pt updated on plan of care. Bed in lowest position. Bed alarm on. Call light within reach.     Problem: CARDIOVASCULAR - ADULT  Goal: Maintains optimal cardiac output and hemodynamic stability  Description: INTERVENTIONS:  - Monitor vital signs, rhythm, and trends  - Monitor for bleeding, hypotension and signs of decreased cardiac output  - Evaluate effectiveness of vasoactive medications to optimize hemodynamic stability  - Monitor arterial and/or venous puncture sites for bleeding and/or hematoma  - Assess quality of pulses, skin color and temperature  - Assess for signs of decreased coronary artery perfusion - ex. Angina  - Evaluate fluid balance, assess for edema, trend weights  Outcome: Progressing     Problem: GASTROINTESTINAL - ADULT  Goal: Maintains or returns to baseline bowel function  Description: INTERVENTIONS:  - Assess bowel function  - Maintain adequate hydration with IV or PO as ordered and tolerated  - Evaluate effectiveness of GI medications  - Encourage mobilization and activity  - Obtain nutritional consult as needed  - Establish a toileting routine/schedule  - Consider collaborating with pharmacy to review patient's medication profile  Outcome: Progressing     Problem: METABOLIC/FLUID AND ELECTROLYTES - ADULT  Goal: Glucose maintained within prescribed range  Description: INTERVENTIONS:  - Monitor Blood Glucose as ordered  - Assess for signs and symptoms of hyperglycemia and hypoglycemia  - Administer ordered medications to maintain glucose within target range  - Assess barriers to adequate nutritional intake and initiate nutrition consult as needed  - Instruct patient on self management of diabetes  Outcome: Progressing

## 2024-12-10 NOTE — H&P
[facility] Hospitalist H&P       CC:   Chief Complaint   Patient presents with    Nausea    Arrythmia/Palpitations        PCP: Randall Almendarez MD    History of Present Illness:  Patient is a 69-year-old male significant past medical history of type II by diabetes, Charcot foot deformity, hypertension, hyperlipidemia restless leg syndrome, history of chronic osteomyelitis, chronic constipation, chronic superficial wound on the posterior lateral left heel, presents with dizziness after straining for a bowel movement.  Patient has been daily constipation and has hemorrhoids, last bowel movement was 2 days ago, today he was trying to have a bowel movement straining got up felt dizzy, alerted a family member who then called EMS.  Currently patient is asymptomatic no lightheadedness dizziness chest pain shortness of breath.  States that the ulcer on the left foot is draining a little bit no pain    In the ER when he first came in he had significant sinus bradycardia with heart rate of 38 blood pressure was stable at 114/45 rest of the vitals were also stable, EKG showed sinus bradycardia, BMP was unremarkable, WC was just 13.8 hemoglobin was stable x-ray of the abdomen was negative, chest x-ray was negative    X-ray of the foot was done that showed chronic findings subtle fracture recommend MRI of the calcaneus to rule out any acute osteomyelitis soft tissue swelling at the hindfoot concerning for cellulitis        PMH  Past Medical History:    Anxiety state    Cellulitis    Cellulitis and abscess of foot, except toes    Charcot foot due to diabetes mellitus (HCC)    Charcot's joint disease due to secondary diabetes    Chronic osteomyelitis of left tibia with draining sinus (HCC)    Essential hypertension    Exposed orthopaedic hardware (HCC)    Lt foot after Charcot joint surgery revision.  Hardware removed 2018    Heart murmur    High blood pressure    High cholesterol    Hypertriglyceridemia    Hypokalemia    Hypokalemia     Methicillin susceptible Staphylococcus aureus infection    Neuropathy    Obesity, unspecified    Osteoarthrosis, unspecified whether generalized or localized, unspecified site    Osteomyelitis of ankle or foot, acute (HCC)    post-surgical,  at [facility]    Osteomyelitis of ankle or foot, acute, left (HCC)    OSTEOPENIA    Other and unspecified hyperlipidemia    RLS (restless legs syndrome)    Stroke (HCC)    Type II or unspecified type diabetes mellitus without mention of complication, not stated as uncontrolled    Visual impairment    reading glasses        PSH  Past Surgical History:   Procedure Laterality Date    Colonoscopy N/A 1/24/2024    Procedure: COLONOSCOPY with cold snare polypectomy x4 and endoclip x1;  Surgeon: Joey Adams MD;  Location:  ENDOSCOPY    Hemorrhoidectomy      Open rx patella fx      Open rx periartic fx/disloc elbow      Other surgical history  2011, 2012    bilateral foot surgery for Charcot joint    Tonsillectomy  12 y/o        ALL:  Allergies[1]     Home Medications:  Medications Taking[2]      Soc Hx  Social History     Tobacco Use    Smoking status: Never    Smokeless tobacco: Never   Substance Use Topics    Alcohol use: No        Fam Hx  Family History   Problem Relation Age of Onset    Heart Disease Father     Diabetes Father     Heart Disorder Mother         CHF    Pulmonary Disease Mother         COPD    Heart Surgery Mother     Breast Cancer Sister 44        stage 0, BrCA gene negative       Review of Systems  General: Denies unintentional weight loss, fevers, or chills negative except for above  HEENT: Denies vision loss or double vision, denies hearing loss  Cardiovascular: Denies chest pain, palpitations, peripheral edema  Pulmonary: Denies cough, shortness of breath, or wheezing  Gastrointestinal: Denies abdominal pain, melena, or hematochezia  Genitourinary: Denies urinary frequency, urgency, and dysuria  Neurologic: Denies numbness, headaches, focal weakness  Skin:  Denies rashes, sores  Endocrine: Denies heat or cold intolerance, denies polydipsia  Hematologic: Denies abnormal bleeding or bruising     OBJECTIVE:  BP (!) 114/93   Pulse 69   Temp 98.4 °F (36.9 °C) (Oral)   Resp 13   Ht 6' 2\" (1.88 m)   Wt 255 lb (115.7 kg)   SpO2 94%   BMI 32.74 kg/m²     BP Readings from Last 3 Encounters:   12/09/24 (!) 114/93   12/08/24 120/52   11/26/24 118/58     Wt Readings from Last 3 Encounters:   12/09/24 255 lb (115.7 kg)   12/08/24 266 lb (120.7 kg)   11/26/24 265 lb (120.2 kg)       Wt Readings from Last 6 Encounters:   12/09/24 255 lb (115.7 kg)   12/08/24 266 lb (120.7 kg)   11/26/24 265 lb (120.2 kg)   02/20/24 285 lb (129.3 kg)   01/24/24 285 lb (129.3 kg)   12/28/23 280 lb (127 kg)     Gen: No acute distress, alert and oriented x 3  Pulm: Lungs clear bilaterally, good inspiratory effort   CV:  nL S1/S2  Abd: soft, NT/ND, no hepatomegaly, +BS  MSK: moving all extremities, no edema left heel ulcer noted, and dressing  Neuro: no focal deficits  Skin: no rashes/lesions as above  Psych: normal mood/affect          Diagnostic Data:    CBC/Chem  Recent Labs   Lab 12/09/24  1427 12/09/24  1719   WBC 13.8*  --    HGB 14.4 14.1   MCV 86.3  --    .0  --        Recent Labs   Lab 12/09/24  1427      K 4.5      CO2 27.0   BUN 17   CREATSERUM 1.25   *   CA 10.7*       Recent Labs   Lab 12/09/24  1427   ALT 49   AST 38*   ALB 4.6       No results for input(s): \"TROP\" in the last 168 hours.        Radiology: XR CHEST AP PORTABLE  (CPT=71045)    Result Date: 12/9/2024  PROCEDURE:  XR CHEST AP PORTABLE  (CPT=71045)  TECHNIQUE:  AP chest radiograph was obtained.  COMPARISON:  [facility] , XR, XR CHEST AP PORTABLE  (CPT=71045), 12/27/2023, 5:23 PM.  INDICATIONS:  nausea/vomiting, bradycardia  PATIENT STATED HISTORY: (As transcribed by Technologist)  Pt. with  nausea/vomiting, bradycardia    FINDINGS:  Cardiac silhouette is upper normal in size but not significantly  changed.  Minimal left basilar atelectasis and/or scarring.  No significant pleural fluid or pneumothorax.            CONCLUSION:  See above.   LOCATION:  [facility]      Dictated by ([facility]): Stromberg, LeRoy, MD on 12/09/2024 at 4:38 PM     Finalized by ([facility]): Stromberg, LeRoy, MD on 12/09/2024 at 4:39 PM       XR FOOT, COMPLETE (MIN 3 VIEWS), LEFT (CPT=73630)    Result Date: 12/9/2024  PROCEDURE:  XR FOOT, COMPLETE (MIN 3 VIEWS), LEFT (CPT=73630)  TECHNIQUE:  AP, oblique, and lateral views were obtained.  COMPARISON:  [facility] , XR, XR FOOT, COMPLETE (MIN 3 VIEWS), LEFT (CPT=73630), 9/13/2019, 7:58 PM.  INDICATIONS:  nausea/vomiting, bradycardia  PATIENT STATED HISTORY: (As transcribed by Technologist)  Pt. with left foot pain and swelling, open sore at the base of the heel.    FINDINGS:   Severe bony changes from a Charcot joint are seen throughout the foot, most notable at the hindfoot/midfoot.  This appearance is markedly worse since 9/13/2019.  There is soft tissue swelling at the hindfoot/midfoot concerning for cellulitis in the appropriate clinical setting.  On the lateral view, there is a subtle lucency at the posterior margin of the deformed calcaneus which may represent a chronic finding, subtle fracture, or subtle acute osteomyelitis.  Consider MRI of the calcaneus for further assessment as clinically appropriate.             CONCLUSION:  See above.   LOCATION:  [facility]   Dictated by ([facility]): Stromberg, LeRoy, MD on 12/09/2024 at 4:33 PM     Finalized by ([facility]): Stromberg, LeRoy, MD on 12/09/2024 at 4:37 PM       XR ABDOMEN (1 VIEW) (CPT=74018)    Result Date: 12/9/2024  PROCEDURE:  XR ABDOMEN (1 VIEW) (CPT=74018)  INDICATIONS:  nausea/vomiting, bradycardia  COMPARISON:  None.  TECHNIQUE:  Supine AP view was obtained.  PATIENT STATED HISTORY: (As transcribed by Technologist)   Pt with nausea/vomiting, bradycardia    FINDINGS:  There are no dilated air-filled loops of small bowel to suggest a small bowel obstruction.   There is a moderate to large amount of stool projecting over the pelvis.  Bowel gas pattern is nonspecific.            CONCLUSION:  Nonspecific bowel gas pattern.   LOCATION:  [facility]   Dictated by ([facility]): Stromberg, LeRoy, MD on 12/09/2024 at 4:31 PM     Finalized by (CST): Stromberg, LeRoy, MD on 12/09/2024 at 4:33 PM              ASSESSMENT / PLAN:     Patient is a 69-year-old male significant past medical history of type II by diabetes, Charcot foot deformity, hypertension, hyperlipidemia restless leg syndrome, history of chronic osteomyelitis, chronic constipation, chronic superficial wound on the posterior lateral left heel, presents with dizziness after straining for a bowel movement.        # Lightheadedness, dizziness, sinus bradycardia  Resolved likely a vasovagal episode after having strain for a bowel movement-  -EKG reviewed  -Troponin negative cardiology consulted  -Avoid beta-blockers, further workup per cardiology I do think he needs an echo  Completely asymptomatic at this time-      # Acute lower GI bleed  -Witnessed large bowel movement mixed with blood in the bed  -Check CBC every 12  -GI consulted  History of hemorrhoids, recent colonoscopy in January 2024 that, status post polypectomy and Endo Clip placement    # Charcot foot deformity PT  # Chronic versus acute osteomyelitis  -Patient has an ulcer, follows up with podiatry at [place] consult podiatry over here, mild leukocytosis  -X-ray of the foot reviewed  -Cover empirically with Rocephin and Flagyl wound care ordered, defer MRI to podiatry at this time  -    # Type 2 diabetes resume home insulin regimen  -  # Hypertension   hyperlipidemia  #    Prophy:  DVT: Lovenox OT  Deconditioning prevention: PT OT    Dispo: admit to [facility] with telemetry 95    Outpatient records reviewed confirming patient's medical history and medications.     Further recommendations pending patient's clinical course.  [facility] hospitalist to continue to follow patient  while in house    Time spent: greater than 95 minutes spent in d/w pt/family, coordination of care, and d/w staff.     Mohit Finley MD   Internal Medicine  [facility] Hospitalist  Pager: [phone]             [1]   Allergies  Allergen Reactions    Coffee Bean Extract [Coffea Arabica] OTHER (SEE COMMENTS)     Vision changes  Gives blurry vision   [2]   Outpatient Medications Marked as Taking for the 12/9/24 encounter (Hospital Encounter)   Medication Sig Dispense Refill    TRULICITY 1.5 MG/0.5ML Subcutaneous Solution Auto-injector Inject 1.5 mg into the skin once a week.      aspirin 325 MG Oral Tab Take 1 tablet (325 mg total) by mouth daily.      gabapentin 800 MG Oral Tab Take 1 tablet (800 mg total) by mouth 3 (three) times daily. 90 tablet 0    meclizine 25 MG Oral Tab Take 1 tablet (25 mg total) by mouth 3 (three) times daily as needed for Dizziness. 20 tablet 0    docusate sodium 100 MG Oral Cap Take 1 capsule (100 mg total) by mouth every morning.      Insulin Lispro, 1 Unit Dial, (ADMELOG SOLOSTAR) 100 UNIT/ML Subcutaneous Solution Pen-injector INJECT 10 UNITS UNDER THE SKIN THREE TIMES DAILY AND ONCE DAILY PER SLIDING SCALE. UP TO 51 UNITS DAILY 48 mL 1    insulin glargine (BASAGLAR KWIKPEN) 100 UNIT/ML Subcutaneous Solution Pen-injector Inject 40 Units into the skin nightly. 45 mL 1    rosuvastatin 20 MG Oral Tab Take 1 tablet (20 mg total) by mouth nightly. 90 tablet 1    Ondansetron HCl (ZOFRAN) 4 mg tablet Take 1 tablet (4 mg total) by mouth every 6 (six) hours as needed for Nausea. 45 tablet 2    omega-3 fatty acids 1000 MG Oral Cap Take 1,000 mg by mouth daily.      ferrous sulfate 325 (65 FE) MG Oral Tab EC Take 1 tablet (325 mg total) by mouth every other day.      magnesium oxide 400 MG Oral Tab Take 1 tablet (400 mg total) by mouth every other day.      Irbesartan 300 MG Oral Tab Take 1 tablet (300 mg total) by mouth daily.      amLODIPine Besylate 5 MG Oral Tab Take 1 tablet (5 mg total) by mouth  every evening.

## 2024-12-10 NOTE — PLAN OF CARE
Patient arrived to unit from ED at 1735. Patient is a/ox4 lethargic. Per pt he did not sleep well last night. MD also aware. Denies any chest pain or shortness of breath. On RA. On tele-NSR. Incontinent of bowel- pt had a bloody stool on bed, MD was notified. Urinal given to patient. Activity wise up x1 with walker. Two person skin check was completed. Pt has a left foot ulcer on bottom of foot. Foot was wrapped. Wound care consult was placed. Patient did bring medication from home. Placed in patient's closet. Sister who patient lives with will bring them home per patient. Charge RN was made aware. Call light in reach, bed alarm set. Awaiting MD orders.    Subjective:      Patient ID: Pablo German is a 37 y.o. female. Visit Information    Have you changed or started any medications since your last visit including any over-the-counter medicines, vitamins, or herbal medicines? no   Are you having any side effects from any of your medications? -  no  Have you stopped taking any of your medications? Is so, why? -  no    Have you seen any other physician or provider since your last visit? Yes   Have you had any other diagnostic tests since your last visit? Yes - Records Obtained  Have you been seen in the emergency room and/or had an admission to a hospital since we last saw you? No  Have you had your routine dental cleaning in the past 6 months? no    Have you activated your [employer] account? If not, what are your barriers?  Yes     Patient Care Team:  Marco Antonio Joseph MD as PCP - General (Internal Medicine)  Marco Antonio Joseph MD as PCP - [facility]  Marco Antonio Joseph MD (Internal Medicine)    Medical History Review  Past Medical, Family, and Social History reviewed and does contribute to the patient presenting condition    Health Maintenance   Topic Date Due    DTaP/Tdap/Td vaccine (1 - Tdap) 09/10/1990    HIV screen  09/10/1994    Diabetic retinal exam  08/30/2018    Flu vaccine (1) 09/01/2019    Hepatitis B vaccine (1 of 3 - Risk 3-dose series) 07/31/2020 (Originally 9/10/1998)    Pneumococcal 0-64 years Vaccine (1 of 1 - PPSV23) 08/31/2020 (Originally 9/10/1985)    A1C test (Diabetic or Prediabetic)  04/03/2020    Diabetic microalbuminuria test  08/26/2020    Lipid screen  08/26/2020    Diabetic foot exam  01/03/2021    Cervical cancer screen  02/13/2022    Shingles Vaccine (1 of 2) 09/10/2029    Hepatitis A vaccine  Aged Out    Hib vaccine  Aged Out    Meningococcal (ACWY) vaccine  Aged Out       [facility] ([place], [place]) Scores 2/7/2020 7/31/2019 12/14/2018 11/17/2017   PHQ2 Score 0 0 0 0   PHQ9 Score 0 0 0 0     Interpretation of JVD.   Cardiovascular:      Rate and Rhythm: Normal rate and regular rhythm. Heart sounds: Normal heart sounds. No murmur. Comments: Varicose veins on the left lower leg. Some spider veins of the right lower leg. Pulmonary:      Effort: Pulmonary effort is normal.      Breath sounds: Normal breath sounds. No wheezing or rales. Abdominal:      General: Bowel sounds are normal.      Palpations: Abdomen is soft. There is no mass. Tenderness: There is no abdominal tenderness. Musculoskeletal:         General: No deformity. Lymphadenopathy:      Cervical: No cervical adenopathy. Skin:     General: Skin is warm. Findings: No rash. Neurological:      Mental Status: She is alert and oriented to person, place, and time. Psychiatric:         Mood and Affect: Mood is not anxious or depressed. Speech: Speech normal.         Behavior: Behavior normal.         Thought Content: Thought content normal.         Judgment: Judgment normal.         Assessment:      Diagnosis Orders   1. Generalized anxiety disorder     2. Spider veins of both lower extremities     3. Varicose veins of both lower extremities with pain     4. Venous insufficiency of both lower extremities            Plan:     Proceed with continue current medications  Daily exercise and healthy diet encouraged  Good sleep hygiene recommended  Stress reducing activities encouraged  Xanax as needed for severe anxiety  Follow-up with vascular surgery -I did advise her to bring in a list of vascular surgeons and I would give her a referral from this for someone who is covered under her insurance  Call with concerns        Julian Berry received counseling on the following healthy behaviors: nutrition, exercise and medication adherence  Reviewed prior labs and health maintenance  Continue current medications, diet and exercise. Discussed use, benefit, and side effects of prescribed medications. Barriers to medication compliance addressed.

## 2024-12-10 NOTE — OCCUPATIONAL THERAPY NOTE
OCCUPATIONAL THERAPY EVALUATION - INPATIENT    Room Number: 2607/2607-A  Evaluation Date: 12/10/2024     Type of Evaluation: Initial  Presenting Problem: Bradycardia, GI Bleed    Physician Order: IP Consult to Occupational Therapy  Reason for Therapy:  ADL/IADL Dysfunction and Discharge Planning    OCCUPATIONAL THERAPY ASSESSMENT   Patient is a 69 year old male admitted on 12/9/2024 with Presenting Problem: Bradycardia, GI Bleed. Co-Morbidities : Chronic foot deformities  Patient is currently functioning at baseline with toileting, lower body dressing, bed mobility, and transfers.  Prior to admission, patient's baseline is independent at [facility].  Patient met all OT goals at baseline level.  Patient reports no further questions/concerns at this time.     Discharge OT with no additional skilled services but increased support at home    Recommendations for nursing staff:   Transfers: up x1  Toileting location: Toilet    EVALUATION SESSION:  Patient at start of session: bed    FUNCTIONAL TRANSFER ASSESSMENT  Sit to Stand: Edge of Bed  Edge of Bed: Supervision  Toilet Transfer: Supervision    BED MOBILITY  Supine to Sit : Supervision    BALANCE ASSESSMENT     FUNCTIONAL ADL ASSESSMENT  LB Dressing Seated: Stand-by Assist  Toileting Standing: Supervision    ACTIVITY TOLERANCE: good                         O2 SATURATIONS       COGNITION  Overall Cognitive Status:  WFL - within functional limits    COGNITION ASSESSMENTS     Upper Extremity:   ROM: within functional limits   Strength: is within functional limits   Coordination:  Gross motor:   Fine motor:   Sensation:     EDUCATION PROVIDED  Patient Education : Role of Occupational Therapy; Discharge Recommendations (compensatory dressing strategies)  Patient's Response to Education: Verbalized Understanding    Equipment used: none  Demonstrates functional use    Therapist comments: Pt at baseline with functional mobility and ADLs. OT will sign off.     Patient End of Session:  In bed;RN aware of session/findings;Call light within reach    OCCUPATIONAL PROFILE    HOME SITUATION        Lives With: Other (Comment) (sister)    Toilet and Equipment: Comfort height toilet;Toilet riser;Grab bar  Shower/Tub and Equipment: Walk-in shower;Tub transfer bench       Occupation/Status:  for [employer]             Prior Level of Function: independent    SUBJECTIVE  \"I'm a  for the [employer]\"    PAIN ASSESSMENT             OBJECTIVE          WEIGHT BEARING RESTRICTION       AM-PAC ‘6-Clicks’ Inpatient Daily Activity Short Form  -   Putting on and taking off regular lower body clothing?: A Little  -   Bathing (including washing, rinsing, drying)?: A Little  -   Toileting, which includes using toilet, bedpan or urinal? : None  -   Putting on and taking off regular upper body clothing?: None  -   Taking care of personal grooming such as brushing teeth?: None  -   Eating meals?: None    AM-PAC Score:  Score: 22  Approx Degree of Impairment: 25.8%  Standardized Score (AM-PAC Scale): 47.1    ADDITIONAL TESTS     NEUROLOGICAL FINDINGS      PLAN   Patient has been evaluated and presents with no skilled Occupational Therapy needs at this time.  Patient discharged from Occupational Therapy services.  Please re-order if a new functional limitation presents during this admission.         Patient Evaluation Complexity Level:   Occupational Profile/Medical History LOW - Brief history including review of medical or therapy records    Specific performance deficits impacting engagement in ADL/IADL LOW  1 - 3 performance deficits    Client Assessment/Performance Deficits LOW - No comorbidities nor modifications of tasks    Clinical Decision Making LOW - Analysis of occupational profile, problem-focused assessments, limited treatment options    Overall Complexity LOW     OT Session Time: 25 minutes  Self-Care Home Management: 15 minutes  Therapeutic Activity:  minutes  Neuromuscular  Re-education:  minutes  Therapeutic Exercise:  minutes  Cognitive Skills:  minutes  Sensory Integrative:  minutes  Orthotic Management and Training:  minutes  Can add/delete any of these

## 2024-12-10 NOTE — CONSULTS
Infectious Disease Initial Consultation      Date of admission: 12/9/2024  1:33 PM     Date of service: 12/10/24 8:47 AM    Consult requested by: Mohit Lamb MD    Reason for consult: Left foot cellulitis, Charcot foot    Chief complaint: Left foot redness    History of present illness: Delmar Hernández is a 69 year old male with history of type 2 diabetes, Charcot foot deformity, hypertension, hyperlipidemia, chronic osteomyelitis with chronic constipation with chronic superficial wounds of the posterior lateral left heel, who presents to the hospital with a lower GI bleed, currently being managed by GI and the primary team.  While in the hospital, the patient was noted to have worsening redness and drainage from his left foot with concerns for a diabetic foot infection.  An x-ray of the foot showed soft tissue swelling consistent with cellulitis along with severe Charcot deformities.  ID were consulted for recommendations.  The patient was started on IV ceftriaxone and IV Flagyl.  He was given a dose of Zosyn in the emergency room.    In the past, the patient had grown MSSA from his cultures back in 2019 along with Acinetobacter and group G strep.  I do not have any cultures since then.    When I spoke to the patient this morning, he reports that his foot is the same as it always looks.  Drainage is about the same.  There is no increasing pain there is no increasing drainage, there is no increasing redness or swelling or any of that.    Review of systems:  All other components of the review of systems are negative, except those described in the history of present illness.     Past Medical History:    Anxiety state    Cellulitis    Cellulitis and abscess of foot, except toes    Charcot foot due to diabetes mellitus (HCC)    Charcot's joint disease due to secondary diabetes    Chronic osteomyelitis of left tibia with draining sinus (HCC)    Essential hypertension    Exposed orthopaedic hardware (HCC)    Lt  foot after Charcot joint surgery revision.  Hardware removed 2018    Heart murmur    High blood pressure    High cholesterol    Hypertriglyceridemia    Hypokalemia    Hypokalemia    Methicillin susceptible Staphylococcus aureus infection    Neuropathy    Obesity, unspecified    Osteoarthrosis, unspecified whether generalized or localized, unspecified site    Osteomyelitis of ankle or foot, acute (HCC)    post-surgical,  at [place]    Osteomyelitis of ankle or foot, acute, left (HCC)    OSTEOPENIA    Other and unspecified hyperlipidemia    RLS (restless legs syndrome)    Stroke ([facility])    Type II or unspecified type diabetes mellitus without mention of complication, not stated as uncontrolled    Visual impairment    reading glasses     Past Surgical History:   Procedure Laterality Date    Colonoscopy N/A 1/24/2024    Procedure: COLONOSCOPY with cold snare polypectomy x4 and endoclip x1;  Surgeon: Joey Adams MD;  Location:  ENDOSCOPY    Hemorrhoidectomy      Open rx patella fx      Open rx periartic fx/disloc elbow      Other surgical history  2011, 2012    bilateral foot surgery for Charcot joint    Tonsillectomy  12 y/o     Social History     Socioeconomic History    Marital status: Single   Tobacco Use    Smoking status: Never    Smokeless tobacco: Never   Vaping Use    Vaping status: Never Used   Substance and Sexual Activity    Alcohol use: No    Drug use: No     Social Drivers of Health     Food Insecurity: No Food Insecurity (12/9/2024)    Food Insecurity     Food Insecurity: Never true   Transportation Needs: No Transportation Needs (12/9/2024)    Transportation Needs     Lack of Transportation: No   Physical Activity: Medium Risk (7/9/2024)    Received from [facility]    Exercise Vital Sign     On average, how many days per week do you engage in moderate to strenuous exercise (like a brisk walk)?: 7 days     On average, how many minutes do you engage in exercise at this level?: 10 min     Re[facility], [facility]    Stress   Housing Stability: Low Risk  (12/9/2024)    Housing Stability     Housing Instability: No     Family History   Problem Relation Age of Onset    Heart Disease Father     Diabetes Father     Heart Disorder Mother         CHF    Pulmonary Disease Mother         COPD    Heart Surgery Mother     Breast Cancer Sister 44        stage 0, BrCA gene negative     Reviewed, see above    Medications:    amLODIPine    aspirin    docusate sodium    [START ON 12/11/2024] ferrous sulfate    gabapentin    insulin degludec    losartan    magnesium oxide    meclizine    ondansetron    rosuvastatin    glucose **OR** glucose **OR** glucose-vitamin C **OR** dextrose **OR** glucose **OR** glucose **OR** glucose-vitamin C    [Held by provider] enoxaparin    acetaminophen    acetaminophen **OR** HYDROcodone-acetaminophen **OR** HYDROcodone-acetaminophen    melatonin    ondansetron    metoclopramide    polyethylene glycol (PEG 3350)    sennosides    bisacodyl    insulin aspart    insulin aspart    cefTRIAXone    metroNIDAZOLE     Allergies:  Allergies[1]    Physical Exam:  Vitals:    12/10/24 0759   BP: 110/55   Pulse: 67   Resp: 18   Temp: 98.6 °F (37 °C)     Vitals signs and nursing note reviewed.   Constitutional:       Appearance: Normal appearance.   HENT:      Head: Normocephalic and atraumatic.      Mouth: Mucous membranes are moist.   Neck:      Musculoskeletal: Neck supple.   Cardiovascular:      Rate and Rhythm: Normal rate.   Pulmonary:      Effort: Pulmonary effort is normal. No respiratory distress.   Musculoskeletal:      Right lower leg: No edema.      Left lower leg: No edema.  Chronic Charcot deformity involving the left foot with an open wound, mild drainage noted.  No evidence of infection.  Skin:     General: Skin is warm and dry.   Neurological:      General: No focal deficit present.      Mental Status: Alert and oriented to person, place, and time.                Laboratory data:  I have independently reviewed all lab results; including old microbiological results.  Lab Results   Component Value Date    WBC 18.1 12/10/2024    HGB 13.6 12/10/2024    HCT 39.4 12/10/2024    .0 12/10/2024    CREATSERUM 1.40 12/10/2024    BUN 31 12/10/2024     12/10/2024    K 4.6 12/10/2024     12/10/2024    CO2 26.0 12/10/2024     12/10/2024    CA 9.4 12/10/2024    ALB 4.6 12/09/2024    ALKPHO 98 12/09/2024    BILT 0.8 12/09/2024    TP 8.3 12/09/2024    AST 38 12/09/2024    ALT 49 12/09/2024    TROPHS 29 12/09/2024        Recent Labs   Lab 12/10/24  0545   RBC 4.65   HGB 13.6   HCT 39.4   MCV 84.7   MCH 29.2   MCHC 34.5   RDW 15.2   NEPRELIM 15.36*   WBC 18.1*   .0       Microbiology data:  No results found for this visit on 12/09/24.      Radiology:  I have reviewed all imaging data available independently.   X-ray of the left foot:  Severe Charcot deformity with a skin and soft tissue infection concerning for cellulitis.    Impression:  Delmar Hernández is a 69 year old male with     Left sided Charcot foot deformity, with probably underlying chronic osteomyelitis, now presenting with fever, leukocytosis with concerns for evolving sepsis with threat to life    At this point, I do not see any overt skin and soft tissue infection involving his foot. The patient is reporting that his foot always looks this way without any change   However, the patient just had another fever this afternoon concerning for an underlying abscess or infection, along with worsening leukocytosis   Currently sees Nayana for his Charcot deformity  Status post one dose of Zosyn in the emergency room, and then ceftriaxone and Flagyl after admission.   In 2019, the patient had grown MSSA, Acinetobacter and along with strep species, no cultures in the system since then  Type 2 diabetes  As per the primary team  GI bleed  As per GI and primary team    Recommendations:     Repeat 2  sets of blood cultures   Please obtain aerobic and anaerobic cultures from his foot drainage   Continue to gent white blood cell count   Obtain drainage cultures from his foot   Obtain an MRI of the left foot with and without contrast   Start  IV cefepime 1 g q.12 along with IV vancomycin, pharmacy dose along with p.o. metronidazole 500 mg twice daily   Continue to monitor their daily labs for antibiotic toxicity   Further recommendations will depend on the above workup and clinical progress     The plan of care was discussed with the primary hospital team, Mohit Lamb MD     Recommendations were also discussed with the patient; all questions were answered.     Thank you for this consultation. Please don't hesitate to call the ID team for questions or any acute changes in patient's clinical condition.    Please note that this report has been produced using speech recognition software and may contain errors related to that system including, but not limited to, errors in grammar, punctuation, and spelling, as well as words and phrases that possibly may have been recognized inappropriately.  If there are any questions or concerns, contact the dictating provider for clarification.    The  [employer] Act makes medical notes like these available to patients in the interest of transparency. Please be advised this is a medical document. Medical documents are intended to carry relevant information, facts as evident, and the clinical opinion of the practitioner. The medical note is intended as peer to peer communication and may appear blunt or direct. It is written in medical language and may contain abbreviations or verbiage that are unfamiliar.     Gurmeet Cabrales MD  [facility]. Tel: [phone]. Fax: [phone].     Delmra Hernández : 1955 MRN: [id number] [facility]: [id number]          [1]   Allergies  Allergen Reactions    Coffee Bean Extract [Coffea Arabica] OTHER (SEE COMMENTS)     Vision  changes  Gives blurry vision

## 2024-12-11 NOTE — PLAN OF CARE
Patient decided to leave AMA today despite patient education. IV was removed and patient belongings sent with patient. Patient left facility at 1850.

## 2024-12-11 NOTE — PAYOR COMM NOTE
--------------  ADMISSION REVIEW     Payor: [facility]  Subscriber #:  [id number]  Authorization Number: [id number]    Admit date: 12/9/24  Admit time:  7:02 PM       REVIEW DOCUMENTATION:     ED Provider Notes        ED Provider Notes signed by Zoie Stahl MD at 12/10/2024  1:28 PM        Chief Complaint   Patient presents with    Nausea    Arrythmia/Palpitations     Stated Complaint: nausea/vomiting, bradycardia    Subjective:   HPI      The patient is a 69-year-old male history hypertension diabetes presented to the ER with bradycardia.  He states he was attempted to have a bowel movement when he started feeling lightheaded and dizzy and asked his sister to call 911.  He is on metoprolol which she took earlier today.  He denies any chest pain or shortness of breath.  He notes he has pain in his lower abdomen he notes chronic issues with constipation.     Objective:     Past Medical History:    Anxiety state    Cellulitis    Cellulitis and abscess of foot, except toes    Charcot foot due to diabetes mellitus (HCC)    Charcot's joint disease due to secondary diabetes    Chronic osteomyelitis of left tibia with draining sinus (HCC)    Essential hypertension    Exposed orthopaedic hardware (HCC)    Lt foot after Charcot joint surgery revision.  Hardware removed 2018    Heart murmur    High blood pressure    High cholesterol    Hypertriglyceridemia    Hypokalemia    Hypokalemia    Methicillin susceptible Staphylococcus aureus infection    Neuropathy    Obesity, unspecified    Osteoarthrosis, unspecified whether generalized or localized, unspecified site    Osteomyelitis of ankle or foot, acute (HCC)    post-surgical,  at [place]    Osteomyelitis of ankle or foot, acute, left (HCC)    OSTEOPENIA    Other and unspecified hyperlipidemia    RLS (restless legs syndrome)    Stroke ([facility])    Type II or unspecified type diabetes mellitus without mention of complication, not stated as  uncontrolled    Visual impairment    reading glasses       Past Surgical History:   Procedure Laterality Date    Colonoscopy N/A 1/24/2024    Procedure: COLONOSCOPY with cold snare polypectomy x4 and endoclip x1;  Surgeon: Joey Adams MD;  Location:  ENDOSCOPY    Hemorrhoidectomy      Open rx patella fx      Open rx periartic fx/disloc elbow      Other surgical history  2011, 2012    bilateral foot surgery for Charcot joint    Tonsillectomy  12 y/o         ED Triage Vitals   BP 12/09/24 1343 114/45   Pulse 12/09/24 1343 (!) 38   Resp 12/09/24 1343 20   Temp 12/09/24 1359 97.5 °F (36.4 °C)   Temp src 12/09/24 1359 Oral   SpO2 12/09/24 1343 100 %   O2 Device 12/09/24 1340 None (Room air)       Current Vitals:   Vital Signs  BP: 98/58  Pulse: 81  Resp: 19  Temp: 98.4 °F (36.9 °C)  Temp src: Oral  MAP (mmHg): 68    Oxygen Therapy  SpO2: 96 %  O2 Device: None (Room air)  Pulse Oximetry Type: Continuous  Oximetry Probe Site Changed: No  Pulse Ox Probe Location: Left hand        Physical Exam  Vitals and nursing note reviewed.   Constitutional:       General: He is not in acute distress.     Appearance: Normal appearance.   HENT:      Head: Normocephalic.      Nose: Nose normal.      Mouth/Throat:      Mouth: Mucous membranes are moist.   Eyes:      Extraocular Movements: Extraocular movements intact.      Pupils: Pupils are equal, round, and reactive to light.   Cardiovascular:      Rate and Rhythm: Regular rhythm. Bradycardia present.      Pulses: Normal pulses.   Pulmonary:      Effort: Pulmonary effort is normal.   Abdominal:      General: Abdomen is flat. Bowel sounds are normal. There is no distension.      Palpations: Abdomen is soft.      Tenderness: There is no abdominal tenderness. There is no right CVA tenderness, left CVA tenderness, guarding or rebound.      Hernia: No hernia is present.   Musculoskeletal:         General: No swelling or tenderness. Normal range of motion.      Cervical back: Normal  range of motion.      Comments: Left foot with diabetic wound    Skin:     General: Skin is warm and dry.   Neurological:      Mental Status: He is alert and oriented to person, place, and time. Mental status is at baseline.   Psychiatric:         Mood and Affect: Mood normal.         Labs Reviewed   CBC WITH DIFFERENTIAL WITH PLATELET - Abnormal; Notable for the following components:       Result Value    WBC 13.8 (*)     Neutrophil Absolute Prelim 11.71 (*)     Neutrophil Absolute 11.71 (*)     All other components within normal limits   COMP METABOLIC PANEL (14) - Abnormal; Notable for the following components:    Glucose 101 (*)     Calcium, Total 10.7 (*)     AST 38 (*)     Total Protein 8.3 (*)     Globulin  3.7 (*)     All other components within normal limits   HEMOGLOBIN A1C - Abnormal; Notable for the following components:    HgbA1C 5.7 (*)     All other components within normal limits   BASIC METABOLIC PANEL (8) - Abnormal; Notable for the following components:    Glucose 192 (*)     Sodium 135 (*)     BUN 31 (*)     Creatinine 1.40 (*)     eGFR-Cr 55 (*)     All other components within normal limits   CBC WITH DIFFERENTIAL WITH PLATELET - Abnormal; Notable for the following components:    WBC 18.1 (*)     Neutrophil Absolute Prelim 15.36 (*)     Neutrophil Absolute 15.36 (*)     Lymphocyte Absolute 0.76 (*)     Monocyte Absolute 1.60 (*)     All other components within normal limits   POCT GLUCOSE - Abnormal; Notable for the following components:    POC Glucose 165 (*)     All other components within normal limits   POCT GLUCOSE - Abnormal; Notable for the following components:    POC Glucose 171 (*)     All other components within normal limits   POCT GLUCOSE - Abnormal; Notable for the following components:    POC Glucose 192 (*)     All other components within normal limits   TROPONIN I HIGH SENSITIVITY - Normal   LACTIC ACID, PLASMA - Normal   HEMOGLOBIN + HEMATOCRIT - Normal   TSH W REFLEX TO FREE  T4 - Normal   RAINBOW DRAW LAVENDER   RAINBOW DRAW LIGHT GREEN   RAINBOW DRAW BLUE   RAINBOW DRAW GOLD   BLOOD CULTURE   BLOOD CULTURE     EKG    Rate, intervals and axes as noted on EKG Report.  Rate: 38  Rhythm: Sinus Rhythm  Reading: no grupo, non specific st changes                 XR CHEST AP PORTABLE  (CPT=71045)    Result Date: 12/9/2024  CONCLUSION:  See above.   LOCATION:  [facility]      Dictated by (CST): Stromberg, LeRoy, MD on 12/09/2024 at 4:38 PM     Finalized by (CST): Stromberg, LeRoy, MD on 12/09/2024 at 4:39 PM       XR FOOT, COMPLETE (MIN 3 VIEWS), LEFT (CPT=73630)    Result Date: 12/9/2024  CONCLUSION:  See above.   LOCATION:  [facility]   Dictated by (CST): Stromberg, LeRoy, MD on 12/09/2024 at 4:33 PM     Finalized by (CST): Stromberg, LeRoy, MD on 12/09/2024 at 4:37 PM       XR ABDOMEN (1 VIEW) (CPT=74018)    Result Date: 12/9/2024  CONCLUSION:  Nonspecific bowel gas pattern.   LOCATION:  [facility]   Dictated by (CST): Stromberg, LeRoy, MD on 12/09/2024 at 4:31 PM     Finalized by (CST): Stromberg, LeRoy, MD on 12/09/2024 at 4:33 PM           MDM          Admission disposition: 12/9/2024  4:13 PM           Medical Decision Making  The differential includes the following  Vasovagal episode, cardiac arrhythmia, metabolic dysfunction, sespsis 2/2 diabetic foot wound, acs    Pertinent comorbidities include  As listed above     Pertinent social history includes  As listed above     Labs  WBC 13.8, lactic acid 1.6   Trop 29    Imaging studies  I reviewed the images and my independent interpreation after review is no evidence of sbo on kub. Additionaly, I reviewd the radiology report that states the following non specific bowel gas pattern        ER course  Patient on arrival was bradycardic to the low 30s and diaphoretic and complaining of lightheadedness. SBP also 90s.  Patient started on fluids EKG with sinus bradycardia with no signs of ischemia or AV block.  Patient was given a dose of 0.5 mg of atropine.   Heart rate improved.  [facility] consulted and patient evaluated by them.  U he will be monitored overnight mentality but no urgent pacemaker will be placed at this time.  Nclear if patient had a vagal event or he could be suffering from sepsis given diabetic foot wound however vitals overall showed improvement.  Patient given empiric antibiotics after blood cultures and lactic acid were drawn.  Patient did have a bowel movement and felt abdominal relief.  Patient has bright red blood covered on stool. No active bleeding on consented digital rectal exam. Pt admitted and endorsed to [facility] hospitalist.     A total of 35 minutes of critical care time (exclusive of billable procedures) was administered to manage the patient's unstable vital signs due to his symptomatic bradycardia.  This involved direct patient intervention, complex decision making, and/or extensive discussions with the patient, family, and clinical staff.      Clinical Impression:  1. Symptomatic bradycardia         Disposition:  Admit         Present on Admission  Date Reviewed: 12/8/2024            ICD-10-CM Noted POA    * (Principal) Symptomatic bradycardia R00.1 12/9/2024 Unknown            12/9 consult IR Cardiology    Reason for Consultation:  Bradycardia     History of Present Illness:  Delmar Hernández is a a(n) 69 year old male with history of diabetes, Charcot foot deformity, hypertension, hyperlipidemia, presents in the hospital with complaints of dizziness and nausea.  Patient states that he ran out of his gabapentin and started taking hydrocodone for pain and states he felt constipated ever since.  He has not had a bowel movement in 2 days.  He states has been trying and since has become dizzy.  He presented to the hospital his heart rate was in the mid 30s.  EKG showed marked sinus bradycardia.        /83   Pulse 67   Temp 97.5 °F (36.4 °C) (Oral)   Resp 17   Ht 6' 2\" (1.88 m)   Wt 255 lb (115.7 kg)   SpO2 99%     Physical Exam:    General: Alert and oriented x 3. No apparent distress. No respiratory or constitutional distress.  HEENT: Normocephalic, anicteric sclera, neck supple.  Neck: No JVD, carotids 2+, no bruits.  Cardiac: Regular rate and rhythm. S1, S2 normal. No murmur, pericardial rub, S3.  Lungs: Clear without wheezes, rales, rhonchi or dullness.  Normal excursions and effort.  Abdomen: Soft, non-tender. BS-present.  Extremities: Without clubbing, cyanosis or edema.  Peripheral pulses are 2+.  Neurologic: Alert and oriented, normal affect.  Skin: Warm and dry.      Laboratory Data:     ImaginL EKG:  marked sinus bradycardia    Impression:    Sinus Bradycardia symptomatic likely multifactorial  Chronic Pain  Constipation    Recommendations:  Continue to hold AVN blocking agents  Stool softeners  Avoid narcotics if possible for pain  Cardiac monitoring overnight  Atropine as needed for symptomatic bradycardia        12/9 H&P  Patient presents with    Nausea    Arrythmia/Palpitations         PCP: Randall Almendarez MD     History of Present Illness:  Patient is a 69-year-old male significant past medical history of type II by diabetes, Charcot foot deformity, hypertension, hyperlipidemia restless leg syndrome, history of chronic osteomyelitis, chronic constipation, chronic superficial wound on the posterior lateral left heel, presents with dizziness after straining for a bowel movement.  Patient has been daily constipation and has hemorrhoids, last bowel movement was 2 days ago, today he was trying to have a bowel movement straining got up felt dizzy, alerted a family member who then called EMS.  Currently patient is asymptomatic no lightheadedness dizziness chest pain shortness of breath.  States that the ulcer on the left foot is draining a little bit no pain     In the ER when he first came in he had significant sinus bradycardia with heart rate of 38 blood pressure was stable at 114/45 rest of the vitals were also stable, EKG  showed sinus bradycardia, BMP was unremarkable, WC was just 13.8 hemoglobin was stable x-ray of the abdomen was negative, chest x-ray was negative     X-ray of the foot was done that showed chronic findings subtle fracture recommend MRI of the calcaneus to rule out any acute osteomyelitis soft tissue swelling at the hindfoot concerning for cellulitis        ASSESSMENT / PLAN:      Patient is a 69-year-old male significant past medical history of type II by diabetes, Charcot foot deformity, hypertension, hyperlipidemia restless leg syndrome, history of chronic osteomyelitis, chronic constipation, chronic superficial wound on the posterior lateral left heel, presents with dizziness after straining for a bowel movement.          # Lightheadedness, dizziness, sinus bradycardia  Resolved likely a vasovagal episode after having strain for a bowel movement-  -EKG reviewed  -Troponin negative cardiology consulted  -Avoid beta-blockers, further workup per cardiology I do think he needs an echo  Completely asymptomatic at this time-        # Acute lower GI bleed  -Witnessed large bowel movement mixed with blood in the bed  -Check CBC every 12  -GI consulted  History of hemorrhoids, recent colonoscopy in January 2024 that, status post polypectomy and Endo Clip placement     # Charcot foot deformity PT  # Chronic versus acute osteomyelitis  -Patient has an ulcer, follows up with podiatry at [place] consult podiatry over here, mild leukocytosis  -X-ray of the foot reviewed  -Cover empirically with Rocephin and Flagyl wound care ordered, defer MRI to podiatry at this time  -     # Type 2 diabetes resume home insulin regimen  -  # Hypertension   hyperlipidemia  #     Prophy:  DVT: Lovenox OT  Deconditioning prevention: PT OT     Dispo: admit to [facility] with telemetry 95    12/10 IM       Chief Complaint: Symptomatic bradycardia        Subjective:  Patient is doing well however despite a low-grade fever of 100.6 overnight, also had  couple bowel movements that were bloody today and yesterday had bloody bowel movement as well.  No abdominal pain no nausea no vomiting        Assessment & Plan:    # Lightheadedness, dizziness, sinus bradycardia  Resolved likely a vasovagal episode after having strain for a bowel movement-  -EKG reviewed  -Troponin negative cardiology consulted  -Avoid beta-blockers, further workup per cardiology I do think he needs an echo however patient has a TTE scheduled as an outpatient and will follow with cardiologist  -Cardiology has currently signed off  Completely asymptomatic at this time-        # Acute lower GI bleed  -Witnessed large bowel movement mixed with blood in the bed  -Check CBC every 12, hemoglobin has been stable even though he did have 2 more episodes of bloody bowel movements today  -GI consulted, pending evaluation  History of hemorrhoids, recent colonoscopy in January 2024 that, status post polypectomy and Endo Clip placement     # Charcot foot deformity PT  # Chronic versus acute osteomyelitis-acute osteomyelitis has been ruled out  # Low-grade fevers,-sepsis without a known cause at this time  -Patient has an ulcer, follows up with podiatry at [place] consult podiatry over here, mild leukocytosis  -X-ray of the foot reviewed  -Per infectious disease no further IV antibiotics needed for the wound as wound appears to be chronic and stable, podiatry also signed off and did a local excision, no further antibiotics  -Patient is spiked fever 100.6, with uptrending leukocytosis, will check a UA, await urine cultures and blood cultures at this time, defer antibiotics to ID  -     # Type 2 diabetes resume home insulin regimen  -Slightly elevated, resume home insulin regimen at this time, ACHS Accu-Cheks  # Hypertension   hyperlipidemia       12/10 consult ID    Consult Orders   Consult to Infectious Disease [[id number]] ordered by Mohit Lamb MD at 12/09/24 1913          Expand All Collapse  All      Infectious Disease Initial Consultation        Date of admission: 12/9/2024  1:33 PM      Date of service: 12/10/24 8:47 AM     Consult requested by: Mohit Lamb MD     Reason for consult: Left foot cellulitis, Charcot foot     Chief complaint: Left foot redness     History of present illness: Delmar Hernández is a 69 year old male with history of type 2 diabetes, Charcot foot deformity, hypertension, hyperlipidemia, chronic osteomyelitis with chronic constipation with chronic superficial wounds of the posterior lateral left heel, who presents to the hospital with a lower GI bleed, currently being managed by GI and the primary team.  While in the hospital, the patient was noted to have worsening redness and drainage from his left foot with concerns for a diabetic foot infection.  An x-ray of the foot showed soft tissue swelling consistent with cellulitis along with severe Charcot deformities.  ID were consulted for recommendations.  The patient was started on IV ceftriaxone and IV Flagyl.  He was given a dose of Zosyn in the emergency room.     In the past, the patient had grown MSSA from his cultures back in 2019 along with Acinetobacter and group G strep.  I do not have any cultures since then.         Musculoskeletal:      Right lower leg: No edema.      Left lower leg: No edema.  Chronic Charcot deformity involving the left foot with an open wound, mild drainage noted.     Recommendations:      Repeat 2 sets of blood cultures   Please obtain aerobic and anaerobic cultures from his foot drainage   Continue to gent white blood cell count   Obtain drainage cultures from his foot   Obtain an MRI of the left foot with and without contrast   Start  IV cefepime 1 g q.12 along with IV vancomycin, pharmacy dose along with p.o. metronidazole 500 mg twice daily   Continue to monitor their daily labs for antibiotic toxicity   Further recommendations will depend on the above workup and clinical progress       12/10 consult GI    Reason for Consultation:     Rectal bleeding     History of Present Illness:  Delmar Hernández is a a(n) 69 year old male.      C/o constipation since on norco  Strained and had 2 BM with some blood     Review of Systems:  Gastrointestinal: Denies positive test for blood stool, heartburn/indigestion/reflux, belching, difficulty swallowing, irregular bowel habits, painful swallowing, diarrhea, abdominal pain, constipation, nausea, incontinence of stool, vomiting, black stools, get full quickly at meals, blood in stools, abdominal distention, jaundice, flatulence, vomiting blood, bloating, hernia, laxative use, food/milk intolerance, pain with bowel movement, hemorrhoids.  General: Denies fatigue, chills/fever, night sweats, weight loss, loss of appetite, weight gain, sleep disturbance.  Neurological: Denies frequent headaches, history of stroke, recent passing out, recent dizziness, convulsions/seizures, dementia.  Cardiovascular: Denies history of heart murmur, leg swelling, history of rheumatic fever, pacemaker, chest pain or pressure after eating or when upset, automatic defibrillator, angina, other implanted devices, irregular heart rate/palpitations, high cholesterol or triglycerides, coronary stents.  Respiratory: Denies shortness of breath, chronic/frequent hoarseness, wheezing, exposure to tuberculosis, chronic cough, spitting up blood, cough up sputum, sleep apnea.  Genitourinary: Denies kidney stones, painful/difficult urination, frequent urinary infections, frequent urination, blood in urine, incontinence, kidney failure, prostate problems.  Endocrine: Denies thyroid disease, denies diabetes.  Female complaints: Denies endometriosis, painful menstrual periods, heavy menstrual periods.  Patient is not pregnant.  Psychosocial: Denies history of mental illness, denies usually feeling lonely or depressed, denies history of depression, anxiety, history of physical or sexual abuse, stress,  history of eating disorder.  Skin: Denies severe itching, unusual moles, rash, flushing, change in hair or nails.  Bone/joint: Denies arthritis, back pain, joint pain, osteoporosis.  Heme/Lymphatic: Denies easy bruising, anemia, excessive bleeding, enlarging or painful lymph nodes.  Allergy: Denies medication allergy, latex/rubber allergy, anaphylactic or other reaction to anesthesia, food allergy.   Eyes: Denies blurred/double vision, eye disease, glasses or contacts, glaucoma.  ENT: Denies nose or gums bleeding, mouth sores, bad breath or bad taste in mouth, hearing loss.      Assessment/Plan:     Hemorrhoid bleeding     Sign off at this point.  Please call us with questions.  Thank you very much for allowing us to participate in the care of your patient.         Patient Active Problem List   Diagnosis    Pure hyperglyceridemia    Charcot's joint disease due to secondary diabetes (HCC)    Mixed hyperlipidemia    Hypokalemia    Diabetic peripheral neuropathy (HCC)    Essential hypertension    Non-healing ulcer of foot (HCC)    Hyponatremia    Open wound of left ankle    Open wound of heel, left, initial encounter    Diabetic foot ulcer (HCC)    Venous insufficiency (chronic) (peripheral)    Diabetic foot infection (HCC)    Type 2 diabetes mellitus with foot ulcer, with long-term current use of insulin (HCC)    Chest pain of uncertain etiology    Hyperglycemia    Hemorrhagic cerebrovascular accident (CVA) (HCC)    Nonrheumatic aortic valve stenosis    Anemia in other chronic diseases classified elsewhere    Elevated troponin    Dizziness    Epigastric pain    Chest pain, unspecified type    Symptomatic bradycardia             MEDICATIONS ADMINISTERED IN LAST 1 DAY:  insulin aspart (NovoLOG) 100 Units/mL FlexPen 1-5 Units       Date Action Dose Route User    Discharged on 12/10/2024    12/10/2024 1220 Given 2 Units Subcutaneous (Left Upper Arm) Dara Guzman, RN          insulin aspart (NovoLOG) 100 Units/mL FlexPen  10 Units       Date Action Dose Route User    Discharged on 12/10/2024    12/10/2024 1303 Given 10 Units Subcutaneous (Left Upper Arm) Dara Guzman RN    12/10/2024 1015 Given 10 Units Subcutaneous (Left Upper Arm) Dara Guzman, CINTHYA            atropine 0.1 MG/ML injection 0.5 mg  Dose: 0.5 mg  Freq: Once Route: IV  Start: 12/09/24 1346 End: 12/09/24 1350            1350 BH-Given            cefTRIAXone (Rocephin) 2 g in sodium chloride 0.9% 100 mL IVPB-ADDV  Dose: 2 g  Freq: Every 24 hours Route: IV  Last Dose: 2 g (12/09/24 2047)  Start: 12/09/24 1930 End: 12/10/24 0904   Admin Instructions:   Ceftriaxone must NOT be administered simultaneously with calcium containing IV solutions. Includes Y-site as well.  In patients other than neonates ceftriaxone and calcium containing products may administered sequentially, provided the line is flushed in between administrations.   Order specific questions:               2047 SF-New Bag      0904-D/C'd          metroNIDAZOLE in sodium chloride 0.79% (Flagyl) 5 mg/mL IVPB premix 500 mg  Dose: 500 mg  Freq: Every 12 hours Route: IV  Last Dose: 500 mg (12/10/24 0917)  Start: 12/09/24 2000 End: 12/10/24 1046   Order specific questions:               2055 SF-New Bag      0917 NR-New Bag          piperacillin-tazobactam (Zosyn) 4.5 g in dextrose 5% 100 mL IVPB-ADDV  Dose: 4.5 g  Freq: Once Route: IV  Last Dose: 4.5 g (12/09/24 1708)  Start: 12/09/24 1607 End: 12/09/24 1738   Admin Instructions:   Incompatible with Lactated Ringers (LR)   Order specific questions:               1708 ET-New Bag              Vitals (last day) before discharge       Date/Time Temp Pulse Resp BP SpO2 Weight O2 Device O2 Flow Rate (L/min) [facility]    12/10/24 1205 98.4 °F (36.9 °C) 81 19 98/58 -- -- None (Room air) -- CM    12/10/24 1152 -- 60 -- -- -- -- -- -- CM    12/10/24 0759 98.6 °F (37 °C) 67 18 110/55 96 % -- None (Room air) -- CM    12/10/24 0525 99 °F (37.2 °C) 63 14 105/42 92 % -- None (Room air)  -- AB    12/09/24 2359 100.6 °F (38.1 °C) 81 21 113/58 94 % -- None (Room air) -- VW    12/09/24 2043 98.7 °F (37.1 °C) 90 13 121/64 94 % -- None (Room air) -- AB    12/09/24 2030 -- 89 13 -- 94 % -- -- -- AB    12/09/24 1750 -- 69 13 114/93 94 % -- -- -- CY    12/09/24 1745 98.4 °F (36.9 °C) 74 21 138/69 94 % -- -- -- CY    12/09/24 1700 -- 62 17 99/84 -- -- -- -- ET    12/09/24 1630 -- 52 19 89/49 -- -- -- -- EH    12/09/24 1530 -- 60 15 141/67 -- -- -- -- ET    12/09/24 1445 -- 67 17 -- -- -- None (Room air) --     12/09/24 1430 -- 67 18 128/83 99 % -- None (Room air) --     12/09/24 1401 -- 60 -- 97/74 99 % -- -- --     12/09/24 1359 97.5 °F (36.4 °C) 57 -- -- -- -- -- --     12/09/24 1350 -- 44 -- 107/54 -- -- -- --     12/09/24 1343 -- 38 20 114/45 100 % 255 lb (115.7 kg) None (Room air) --     12/09/24 1340 -- -- -- -- -- -- None (Room air) --            Latest Reference Range & Units 12/10/24 05:45   Sodium 136 - 145 mmol/L 135 (L)   Potassium 3.5 - 5.1 mmol/L 4.6   Chloride 98 - 112 mmol/L 102   Carbon Dioxide, Total 21.0 - 32.0 mmol/L 26.0   BUN 9 - 23 mg/dL 31 (H)   CREATININE 0.70 - 1.30 mg/dL 1.40 (H)   CALCIUM 8.7 - 10.4 mg/dL 9.4   EGFR >=60 mL/min/1.73m2 55 (L)   (L): Data is abnormally low  (H): Data is abnormally high   Latest Reference Range & Units 12/09/24 14:27 12/09/24 17:19 12/10/24 05:45   WBC 4.0 - 11.0 x10(3) uL 13.8 (H)  18.1 (H)   Hemoglobin 13.0 - 17.5 g/dL 14.4 14.1 13.6   Hematocrit 39.0 - 53.0 % 44.1 43.0 39.4   Platelet Count 150.0 - 450.0 10(3)uL 226.0  223.0   RBC 3.80 - 5.80 x10(6)uL 5.11  4.65   MCH 26.0 - 34.0 pg 28.2  29.2   MCHC 31.0 - 37.0 g/dL 32.7  34.5   MCV 80.0 - 100.0 fL 86.3  84.7   RDW % 14.9  15.2   Prelim Neutrophil Abs 1.50 - 7.70 x10 (3) uL 11.71 (H)  15.36 (H)   Neutrophils Absolute 1.50 - 7.70 x10(3) uL 11.71 (H)  15.36 (H)   Lymphocytes Absolute 1.00 - 4.00 x10(3) uL 1.04  0.76 (L)   Monocytes Absolute 0.10 - 1.00 x10(3) uL 0.79  1.60 (H)   (H):  Data is abnormally high  (L): Data is abnormally low

## 2024-12-11 NOTE — CONSULTS
[facility]   part of [facility]    Report of Gastroenterology Consultation    Delmar Hernández Patient Status:  Inpatient    1955 MRN [id number]   Location [facility] 2NE-A Attending No att. providers found   Hosp Day # 1 PCP Randall Almendarez MD     Date of Admission:  2024  Date of Consult:   12/10/2024    Reason for Consultation:    Rectal bleeding    History of Present Illness:  Delmar Hernández is a a(n) 69 year old male.     C/o constipation since on norco  Strained and had 2 BM with some blood    History:  Past Medical History:    Anxiety state    Cellulitis    Cellulitis and abscess of foot, except toes    Charcot foot due to diabetes mellitus (HCC)    Charcot's joint disease due to secondary diabetes    Chronic osteomyelitis of left tibia with draining sinus (HCC)    Essential hypertension    Exposed orthopaedic hardware (HCC)    Lt foot after Charcot joint surgery revision.  Hardware removed     Heart murmur    High blood pressure    High cholesterol    Hypertriglyceridemia    Hypokalemia    Hypokalemia    Methicillin susceptible Staphylococcus aureus infection    Neuropathy    Obesity, unspecified    Osteoarthrosis, unspecified whether generalized or localized, unspecified site    Osteomyelitis of ankle or foot, acute (HCC)    post-surgical,  at [facility]    Osteomyelitis of ankle or foot, acute, left (HCC)    OSTEOPENIA    Other and unspecified hyperlipidemia    RLS (restless legs syndrome)    Stroke ([facility])    Type II or unspecified type diabetes mellitus without mention of complication, not stated as uncontrolled    Visual impairment    reading glasses     Past Surgical History:   Procedure Laterality Date    Colonoscopy N/A 2024    Procedure: COLONOSCOPY with cold snare polypectomy x4 and endoclip x1;  Surgeon: Joey Adams MD;  Location:  ENDOSCOPY    Hemorrhoidectomy      Open rx patella fx      Open rx periartic fx/disloc elbow      Other surgical history  ,      bilateral foot surgery for Charcot joint    Tonsillectomy  14 y/o     Family History   Problem Relation Age of Onset    Heart Disease Father     Diabetes Father     Heart Disorder Mother         CHF    Pulmonary Disease Mother         COPD    Heart Surgery Mother     Breast Cancer Sister 44        stage 0, BrCA gene negative      reports that he has never smoked. He has never used smokeless tobacco. He reports that he does not drink alcohol and does not use drugs.    Allergies:  Allergies[1]    Medications:  No current facility-administered medications for this encounter.    Review of Systems:  Gastrointestinal: Denies positive test for blood stool, heartburn/indigestion/reflux, belching, difficulty swallowing, irregular bowel habits, painful swallowing, diarrhea, abdominal pain, constipation, nausea, incontinence of stool, vomiting, black stools, get full quickly at meals, blood in stools, abdominal distention, jaundice, flatulence, vomiting blood, bloating, hernia, laxative use, food/milk intolerance, pain with bowel movement, hemorrhoids.  General: Denies fatigue, chills/fever, night sweats, weight loss, loss of appetite, weight gain, sleep disturbance.  Neurological: Denies frequent headaches, history of stroke, recent passing out, recent dizziness, convulsions/seizures, dementia.  Cardiovascular: Denies history of heart murmur, leg swelling, history of rheumatic fever, pacemaker, chest pain or pressure after eating or when upset, automatic defibrillator, angina, other implanted devices, irregular heart rate/palpitations, high cholesterol or triglycerides, coronary stents.  Respiratory: Denies shortness of breath, chronic/frequent hoarseness, wheezing, exposure to tuberculosis, chronic cough, spitting up blood, cough up sputum, sleep apnea.  Genitourinary: Denies kidney stones, painful/difficult urination, frequent urinary infections, frequent urination, blood in urine, incontinence, kidney failure, prostate  problems.  Endocrine: Denies thyroid disease, denies diabetes.  Female complaints: Denies endometriosis, painful menstrual periods, heavy menstrual periods.  Patient is not pregnant.  Psychosocial: Denies history of mental illness, denies usually feeling lonely or depressed, denies history of depression, anxiety, history of physical or sexual abuse, stress, history of eating disorder.  Skin: Denies severe itching, unusual moles, rash, flushing, change in hair or nails.  Bone/joint: Denies arthritis, back pain, joint pain, osteoporosis.  Heme/Lymphatic: Denies easy bruising, anemia, excessive bleeding, enlarging or painful lymph nodes.  Allergy: Denies medication allergy, latex/rubber allergy, anaphylactic or other reaction to anesthesia, food allergy.   Eyes: Denies blurred/double vision, eye disease, glasses or contacts, glaucoma.  ENT: Denies nose or gums bleeding, mouth sores, bad breath or bad taste in mouth, hearing loss.  Physical Exam:    Blood pressure 98/58, pulse 81, temperature 98.4 °F (36.9 °C), temperature source Oral, resp. rate 19, height 6' 2\" (1.88 m), weight 255 lb (115.7 kg), SpO2 96%.    General: Appears alert, oriented x3 and in no acute distress.  HEENT: Normal. No neck vein distention. Thyroid not enlarged.  No lymphadenopathy.  CV: S1 and S2 normal.  No murmurs or gallops.  Lungs: Clear to auscultation.  Abdomen: Soft and nondistended.  Nontender.  No masses.  Bowel sounds are present.  Back: No CVA tenderness.  Extremities: No edema, cyanosis, or clubbing.  Skin: Warm    Laboratory Data:  Lab Results   Component Value Date    WBC 18.1 12/10/2024    HGB 13.6 12/10/2024    HCT 39.4 12/10/2024    .0 12/10/2024    CREATSERUM 1.40 12/10/2024    BUN 31 12/10/2024     12/10/2024    K 4.6 12/10/2024     12/10/2024    CO2 26.0 12/10/2024     12/10/2024    CA 9.4 12/10/2024    TSH 0.986 12/10/2024       Imaging:       Assessment/Plan:    Hemorrhoid bleeding    Sign off at  this point.  Please call us with questions.  Thank you very much for allowing us to participate in the care of your patient.    Patient Active Problem List   Diagnosis    Pure hyperglyceridemia    Charcot's joint disease due to secondary diabetes (HCC)    Mixed hyperlipidemia    Hypokalemia    Diabetic peripheral neuropathy (HCC)    Essential hypertension    Non-healing ulcer of foot (HCC)    Hyponatremia    Open wound of left ankle    Open wound of heel, left, initial encounter    Diabetic foot ulcer (HCC)    Venous insufficiency (chronic) (peripheral)    Diabetic foot infection (HCC)    Type 2 diabetes mellitus with foot ulcer, with long-term current use of insulin (HCC)    Chest pain of uncertain etiology    Hyperglycemia    Hemorrhagic cerebrovascular accident (CVA) (HCC)    Nonrheumatic aortic valve stenosis    Anemia in other chronic diseases classified elsewhere    Elevated troponin    Dizziness    Epigastric pain    Chest pain, unspecified type    Symptomatic bradycardia              Jorge Epps MD  12/10/2024  10:16 PM         [1]   Allergies  Allergen Reactions    Coffee Bean Extract [Coffea Arabica] OTHER (SEE COMMENTS)     Vision changes  Gives blurry vision

## 2024-12-11 NOTE — PAYOR COMM NOTE
PT discharge home AMA        Discharge Notification    Patient Name: GORGE SAUNDERS  Payor: [facility]  Subscriber #: [id number]  Authorization Number: [id number]  Admit Date/Time: 12/9/2024 1:33 PM  Discharge Date/Time: 12/10/2024 6:53 PM

## 2024-12-12 ENCOUNTER — HOSPITAL ENCOUNTER (OUTPATIENT)
Dept: CARDIOLOGY | Age: 69
End: 2024-12-12
Attending: INTERNAL MEDICINE

## 2024-12-12 LAB
ATRIAL RATE: 38 BPM
P AXIS: 77 DEGREES
P-R INTERVAL: 208 MS
Q-T INTERVAL: 470 MS
QRS DURATION: 144 MS
QTC CALCULATION (BEZET): 373 MS
R AXIS: -36 DEGREES
T AXIS: 33 DEGREES
VENTRICULAR RATE: 38 BPM

## 2024-12-13 NOTE — ED INITIAL ASSESSMENT (HPI)
Patient was attempting to have a bowel movement, felt dizzy and nauseated. Arrives diaphoretic, alert and oriented x 4, denies dizziness on arrival but reports ongoing nausea. Denies chest pain. Has script for metoprolol but was not taken this morning. Baseline heart rate low 50s, en route patient's heart rate 35-40 per EMS.    VOE

## 2024-12-24 ENCOUNTER — HOSPITAL ENCOUNTER (OUTPATIENT)
Dept: CARDIOLOGY | Age: 69
Discharge: HOME | End: 2024-12-24
Attending: INTERNAL MEDICINE

## 2024-12-24 DIAGNOSIS — E78.5 DYSLIPIDEMIA: ICD-10-CM

## 2024-12-24 LAB
AORTIC VALVE AREA (AVA): 0.91
AORTIC VALVE AREA: 1.86
ASCENDING AORTA (AAD): 3
AV MEAN GRADIENT (AVMG): 13
AV MEAN VELOCITY (AVMV): 1.79
AV PEAK GRADIENT (AVPG): 27
AV PEAK VELOCITY (AVPV): 2.62
AV STENOSIS SEVERITY TEXT: NORMAL
AVI LVOT PEAK GRADIENT (LVOTMG): 1.1
E WAVE DECELARATION TIME (MDT): 17
INTERVENTRICULAR SEPTUM IN END DIASTOLE (IVSD): 2.9
LEFT INTERNAL DIMENSION IN SYSTOLE (LVSD): 1.1
LEFT VENTRICULAR INTERNAL DIMENSION IN DIASTOLE (LVDD): 3.9
LEFT VENTRICULAR POSTERIOR WALL IN END DIASTOLE (LVPW): 5.6
LV EF: NORMAL %
LVOT 2D (LVOTD): 29.1
LVOT VTI (LVOTVTI): 1.11
MV E TISSUE VEL MED (MESV): 12.6
MV E WAVE VEL/E TISSUE VEL MED(MSR): 5.34
MV PEAK A VELOCITY (MVPAV): 294
MV PEAK E VELOCITY (MVPEV): 0.88
RV END SYSTOLIC LONGITUDINAL STRAIN FREE WALL (RVGS): 2.2
TRICUSPID VALVE PEAK REGURGITATION VELOCITY (TRPV): 2.9
TV ESTIMATED RIGHT ARTERIAL PRESSURE (RAP): 12.9

## 2024-12-24 PROCEDURE — 93306 TTE W/DOPPLER COMPLETE: CPT | Performed by: INTERNAL MEDICINE

## 2024-12-24 PROCEDURE — 76376 3D RENDER W/INTRP POSTPROCES: CPT

## 2024-12-24 PROCEDURE — 93356 MYOCRD STRAIN IMG SPCKL TRCK: CPT | Performed by: INTERNAL MEDICINE

## 2024-12-24 PROCEDURE — 76376 3D RENDER W/INTRP POSTPROCES: CPT | Performed by: INTERNAL MEDICINE

## 2025-01-07 ENCOUNTER — APPOINTMENT (OUTPATIENT)
Dept: CARDIOLOGY | Age: 70
End: 2025-01-07

## 2025-03-06 ENCOUNTER — E-ADVICE (OUTPATIENT)
Dept: CARDIOLOGY | Age: 70
End: 2025-03-06

## 2025-03-14 ENCOUNTER — OFFICE VISIT (OUTPATIENT)
Dept: FAMILY MEDICINE CLINIC | Facility: CLINIC | Age: 70
End: 2025-03-14
Payer: MEDICAID

## 2025-03-14 VITALS
HEART RATE: 97 BPM | SYSTOLIC BLOOD PRESSURE: 133 MMHG | HEIGHT: 74 IN | TEMPERATURE: 100 F | DIASTOLIC BLOOD PRESSURE: 63 MMHG | OXYGEN SATURATION: 98 % | WEIGHT: 261 LBS | BODY MASS INDEX: 33.5 KG/M2 | RESPIRATION RATE: 18 BRPM

## 2025-03-14 DIAGNOSIS — J06.9 VIRAL UPPER RESPIRATORY TRACT INFECTION: Primary | ICD-10-CM

## 2025-03-14 PROCEDURE — 87637 SARSCOV2&INF A&B&RSV AMP PRB: CPT | Performed by: PHYSICIAN ASSISTANT

## 2025-03-14 PROCEDURE — 99213 OFFICE O/P EST LOW 20 MIN: CPT | Performed by: PHYSICIAN ASSISTANT

## 2025-03-15 NOTE — PATIENT INSTRUCTIONS
Rest   Push fluids   Tylenol OTC as needed for pain/fever   Claritin OTC once daily for nasal drainage  Continue gabapentin for neuropathy as directed by PCP    Please follow up with PCP if no improvement or if symptoms worsen

## 2025-03-15 NOTE — PROGRESS NOTES
CHIEF COMPLAINT:     Chief Complaint   Patient presents with    Cold     Cold sets off neuropathy, cough,chills, started yesterday/ worse this afternoon has been taking tylenol        HPI:   Delmar Hernández is a 69 year old male who presents for cold symptoms for 1 day.  Worsening this afternoon. No fever. + body aches/chills. + fatigue.  No headache. Mild nasal congestion. No sinus pressure. No ear pain. No sore throat. Occasional cough. No chest pain or SOB. No GI symptoms. No covid at home testing. Taking tylenol OTC. Neuropathy has increased as it often does with illness- typically takes extra gabapentin when occurs as directed by PCP       Current Outpatient Medications   Medication Sig Dispense Refill    TRULICITY 1.5 MG/0.5ML Subcutaneous Solution Auto-injector Inject 1.5 mg into the skin once a week.      aspirin 325 MG Oral Tab Take 1 tablet (325 mg total) by mouth daily.      Multiple Vitamins-Minerals (ICAPS AREDS FORMULA) Oral Tab Take 1 each by mouth daily.      meclizine 25 MG Oral Tab Take 1 tablet (25 mg total) by mouth 3 (three) times daily as needed for Dizziness. 20 tablet 0    docusate sodium 100 MG Oral Cap Take 1 capsule (100 mg total) by mouth every morning.      Insulin Lispro, 1 Unit Dial, (ADMELOG SOLOSTAR) 100 UNIT/ML Subcutaneous Solution Pen-injector INJECT 10 UNITS UNDER THE SKIN THREE TIMES DAILY AND ONCE DAILY PER SLIDING SCALE. UP TO 51 UNITS DAILY 48 mL 1    insulin glargine (BASAGLAR KWIKPEN) 100 UNIT/ML Subcutaneous Solution Pen-injector Inject 40 Units into the skin nightly. 45 mL 1    rosuvastatin 20 MG Oral Tab Take 1 tablet (20 mg total) by mouth nightly. 90 tablet 1    TRUEPLUS 5-BEVEL PEN NEEDLES 31G X 5 MM Does not apply Misc USE TO INJECT FIVE TIMES DAILY 500 each 3    Ondansetron HCl (ZOFRAN) 4 mg tablet Take 1 tablet (4 mg total) by mouth every 6 (six) hours as needed for Nausea. 45 tablet 2    omega-3 fatty acids 1000 MG Oral Cap Take 1,000 mg by mouth daily.       ferrous sulfate 325 (65 FE) MG Oral Tab EC Take 1 tablet (325 mg total) by mouth every other day.      magnesium oxide 400 MG Oral Tab Take 1 tablet (400 mg total) by mouth every other day.      Irbesartan 300 MG Oral Tab Take 1 tablet (300 mg total) by mouth daily.      amLODIPine Besylate 5 MG Oral Tab Take 1 tablet (5 mg total) by mouth every evening.        Past Medical History:    Anxiety state    Cellulitis    Cellulitis and abscess of foot, except toes    Charcot foot due to diabetes mellitus (HCC)    Charcot's joint disease due to secondary diabetes    Chronic osteomyelitis of left tibia with draining sinus (HCC)    Essential hypertension    Exposed orthopaedic hardware    Lt foot after Charcot joint surgery revision.  Hardware removed 2018    Heart murmur    High blood pressure    High cholesterol    Hypertriglyceridemia    Hypokalemia    Hypokalemia    Methicillin susceptible Staphylococcus aureus infection    Neuropathy    Obesity, unspecified    Osteoarthrosis, unspecified whether generalized or localized, unspecified site    Osteomyelitis of ankle or foot, acute (HCC)    post-surgical,  at [facility]    Osteomyelitis of ankle or foot, acute, left ([facility])    OSTEOPENIA    Other and unspecified hyperlipidemia    RLS (restless legs syndrome)    Stroke ([facility])    Type II or unspecified type diabetes mellitus without mention of complication, not stated as uncontrolled    Visual impairment    reading glasses      Past Surgical History:   Procedure Laterality Date    Colonoscopy N/A 1/24/2024    Procedure: COLONOSCOPY with cold snare polypectomy x4 and endoclip x1;  Surgeon: Joey Adams MD;  Location:  ENDOSCOPY    Hemorrhoidectomy      Open rx patella fx      Open rx periartic fx/disloc elbow      Other surgical history  2011, 2012    bilateral foot surgery for Charcot joint    Tonsillectomy  14 y/o      Family History   Problem Relation Age of Onset    Heart Disease Father     Diabetes Father     Heart  Disorder Mother         CHF    Pulmonary Disease Mother         COPD    Heart Surgery Mother     Breast Cancer Sister 44        stage 0, BrCA gene negative      Social History     Socioeconomic History    Marital status: Single   Tobacco Use    Smoking status: Never    Smokeless tobacco: Never   Vaping Use    Vaping status: Never Used   Substance and Sexual Activity    Alcohol use: No    Drug use: No     Social Drivers of Health     Food Insecurity: No Food Insecurity (12/9/2024)    Food Insecurity     Food Insecurity: Never true   Transportation Needs: No Transportation Needs (12/9/2024)    Transportation Needs     Lack of Transportation: No    [facility]    Stress   Housing Stability: Low Risk  (12/9/2024)    Housing Stability     Housing Instability: No         REVIEW OF SYSTEMS:   GENERAL:  denies  diminished appetite  SKIN: no rashes or abnormal skin lesions  HEENT: See HPI.    LUNGS: denies shortness of breath or wheezing, See HPI  CARDIOVASCULAR: denies chest pain or palpitations   GI: denies N/V/C or abdominal pain  NEURO: No headache No numbness or tingling in face.    EXAM:   /63 (BP Location: Right arm, Patient Position: Sitting)   Pulse 97   Temp 100 °F (37.8 °C) (Oral)   Resp 18   Ht 6' 2\" (1.88 m)   Wt 261 lb (118.4 kg)   SpO2 98%   BMI 33.51 kg/m²   Physical Exam  Constitutional:       General: He is not in acute distress.     Appearance: Normal appearance.   HENT:      Head: Normocephalic.      Right Ear: Tympanic membrane, ear canal and external ear normal.      Left Ear: Tympanic membrane, ear canal and external ear normal.      Nose: Rhinorrhea present.      Mouth/Throat:      Mouth: Mucous membranes are moist.      Pharynx: Oropharynx is clear. Uvula midline. Posterior oropharyngeal erythema (post nasal drip) present.      Tonsils: No tonsillar exudate.   Eyes:      Conjunctiva/sclera: Conjunctivae normal.      Pupils: Pupils are  equal, round, and reactive to light.   Cardiovascular:      Rate and Rhythm: Normal rate and regular rhythm.      Heart sounds: Normal heart sounds. No murmur heard.  Pulmonary:      Effort: Pulmonary effort is normal. No respiratory distress.      Breath sounds: Normal breath sounds. No wheezing or rhonchi.   Chest:      Chest wall: No tenderness.   Musculoskeletal:      Cervical back: Normal range of motion and neck supple.   Lymphadenopathy:      Cervical: No cervical adenopathy.   Skin:     General: Skin is warm.      Findings: No rash.   Neurological:      Mental Status: He is alert and oriented to person, place, and time.          No results found for this or any previous visit (from the past 24 hours).    ASSESSMENT AND PLAN:   Delmar Hernández is a 69 year old male who presents with URI symptoms that are consistent with:      ASSESSMENT:  Encounter Diagnosis   Name Primary?    Viral upper respiratory tract infection Yes     Quad test sent out     PLAN: Meds as below.  Comfort care instructions as listed in Patient Instructions      Patient Instructions   Rest   Push fluids   Tylenol OTC as needed for pain/fever   Claritin OTC once daily for nasal drainage  Continue gabapentin for neuropathy as directed by PCP    Please follow up with PCP if no improvement or if symptoms worsen      The patient indicates understanding of these issues and agrees to the plan.  The patient is asked to f/u with PCP if sx's persist or worsen.

## 2025-03-18 ENCOUNTER — APPOINTMENT (OUTPATIENT)
Dept: GENERAL RADIOLOGY | Facility: HOSPITAL | Age: 70
End: 2025-03-18
Attending: EMERGENCY MEDICINE
Payer: MEDICAID

## 2025-03-18 ENCOUNTER — HOSPITAL ENCOUNTER (INPATIENT)
Facility: HOSPITAL | Age: 70
LOS: 3 days | Discharge: ANOTHER HOSPITAL | End: 2025-03-21
Attending: EMERGENCY MEDICINE | Admitting: HOSPITALIST
Payer: MEDICAID

## 2025-03-18 ENCOUNTER — HOSPITAL ENCOUNTER (INPATIENT)
Facility: HOSPITAL | Age: 70
LOS: 3 days | Discharge: HOME | End: 2025-03-21
Attending: EMERGENCY MEDICINE | Admitting: HOSPITALIST
Payer: MEDICAID

## 2025-03-18 DIAGNOSIS — L03.115 CELLULITIS OF RIGHT LOWER EXTREMITY: Primary | ICD-10-CM

## 2025-03-18 DIAGNOSIS — R50.9 FEVER, UNSPECIFIED FEVER CAUSE: ICD-10-CM

## 2025-03-18 LAB
ALBUMIN SERPL-MCNC: 4.4 G/DL (ref 3.2–4.8)
ALBUMIN/GLOB SERPL: 1.3 {RATIO} (ref 1–2)
ALP LIVER SERPL-CCNC: 82 U/L
ALT SERPL-CCNC: 53 U/L
ANION GAP SERPL CALC-SCNC: 10 MMOL/L (ref 0–18)
APTT PPP: 37.4 SECONDS (ref 23–36)
AST SERPL-CCNC: 43 U/L (ref ?–34)
BASOPHILS # BLD AUTO: 0.03 X10(3) UL (ref 0–0.2)
BASOPHILS NFR BLD AUTO: 0.2 %
BILIRUB SERPL-MCNC: 0.9 MG/DL (ref 0.2–1.1)
BILIRUB UR QL STRIP.AUTO: NEGATIVE
BUN BLD-MCNC: 18 MG/DL (ref 9–23)
CALCIUM BLD-MCNC: 9.2 MG/DL (ref 8.7–10.6)
CHLORIDE SERPL-SCNC: 99 MMOL/L (ref 98–112)
CLARITY UR REFRACT.AUTO: CLEAR
CO2 SERPL-SCNC: 29 MMOL/L (ref 21–32)
COLOR UR AUTO: YELLOW
CREAT BLD-MCNC: 1.11 MG/DL
EGFRCR SERPLBLD CKD-EPI 2021: 72 ML/MIN/1.73M2 (ref 60–?)
EOSINOPHIL # BLD AUTO: 0.06 X10(3) UL (ref 0–0.7)
EOSINOPHIL NFR BLD AUTO: 0.5 %
ERYTHROCYTE [DISTWIDTH] IN BLOOD BY AUTOMATED COUNT: 14.8 %
FLUAV + FLUBV RNA SPEC NAA+PROBE: NEGATIVE
FLUAV + FLUBV RNA SPEC NAA+PROBE: NEGATIVE
GLOBULIN PLAS-MCNC: 3.4 G/DL (ref 2–3.5)
GLUCOSE BLD-MCNC: 77 MG/DL (ref 70–99)
GLUCOSE BLD-MCNC: 84 MG/DL (ref 70–99)
GLUCOSE UR STRIP.AUTO-MCNC: NORMAL MG/DL
HCT VFR BLD AUTO: 35.8 %
HGB BLD-MCNC: 11.9 G/DL
IMM GRANULOCYTES # BLD AUTO: 0.07 X10(3) UL (ref 0–1)
IMM GRANULOCYTES NFR BLD: 0.6 %
INR BLD: 1.26 (ref 0.8–1.2)
KETONES UR STRIP.AUTO-MCNC: NEGATIVE MG/DL
LACTATE SERPL-SCNC: 1.5 MMOL/L (ref 0.5–2)
LEUKOCYTE ESTERASE UR QL STRIP.AUTO: NEGATIVE
LYMPHOCYTES # BLD AUTO: 1.52 X10(3) UL (ref 1–4)
LYMPHOCYTES NFR BLD AUTO: 12 %
MCH RBC QN AUTO: 27.9 PG (ref 26–34)
MCHC RBC AUTO-ENTMCNC: 33.2 G/DL (ref 31–37)
MCV RBC AUTO: 84 FL
MONOCYTES # BLD AUTO: 1.32 X10(3) UL (ref 0.1–1)
MONOCYTES NFR BLD AUTO: 10.4 %
NEUTROPHILS # BLD AUTO: 9.67 X10 (3) UL (ref 1.5–7.7)
NEUTROPHILS # BLD AUTO: 9.67 X10(3) UL (ref 1.5–7.7)
NEUTROPHILS NFR BLD AUTO: 76.3 %
NITRITE UR QL STRIP.AUTO: NEGATIVE
OSMOLALITY SERPL CALC.SUM OF ELEC: 287 MOSM/KG (ref 275–295)
PH UR STRIP.AUTO: 5.5 [PH] (ref 5–8)
PLATELET # BLD AUTO: 264 10(3)UL (ref 150–450)
POTASSIUM SERPL-SCNC: 4.5 MMOL/L (ref 3.5–5.1)
PROT SERPL-MCNC: 7.8 G/DL (ref 5.7–8.2)
PROT UR STRIP.AUTO-MCNC: 50 MG/DL
PROTHROMBIN TIME: 15.9 SECONDS (ref 11.6–14.8)
RBC # BLD AUTO: 4.26 X10(6)UL
RBC UR QL AUTO: NEGATIVE
RSV RNA SPEC NAA+PROBE: NEGATIVE
SARS-COV-2 RNA RESP QL NAA+PROBE: NOT DETECTED
SODIUM SERPL-SCNC: 138 MMOL/L (ref 136–145)
SP GR UR STRIP.AUTO: 1.03 (ref 1–1.03)
UROBILINOGEN UR STRIP.AUTO-MCNC: NORMAL MG/DL
WBC # BLD AUTO: 12.7 X10(3) UL (ref 4–11)

## 2025-03-18 PROCEDURE — 80053 COMPREHEN METABOLIC PANEL: CPT | Performed by: EMERGENCY MEDICINE

## 2025-03-18 PROCEDURE — 87040 BLOOD CULTURE FOR BACTERIA: CPT | Performed by: EMERGENCY MEDICINE

## 2025-03-18 PROCEDURE — 73630 X-RAY EXAM OF FOOT: CPT | Performed by: EMERGENCY MEDICINE

## 2025-03-18 PROCEDURE — 87070 CULTURE OTHR SPECIMN AEROBIC: CPT | Performed by: EMERGENCY MEDICINE

## 2025-03-18 PROCEDURE — 87205 SMEAR GRAM STAIN: CPT | Performed by: EMERGENCY MEDICINE

## 2025-03-18 PROCEDURE — 87077 CULTURE AEROBIC IDENTIFY: CPT | Performed by: EMERGENCY MEDICINE

## 2025-03-18 PROCEDURE — 83605 ASSAY OF LACTIC ACID: CPT | Performed by: EMERGENCY MEDICINE

## 2025-03-18 PROCEDURE — 85610 PROTHROMBIN TIME: CPT | Performed by: EMERGENCY MEDICINE

## 2025-03-18 PROCEDURE — 99285 EMERGENCY DEPT VISIT HI MDM: CPT

## 2025-03-18 PROCEDURE — 82962 GLUCOSE BLOOD TEST: CPT

## 2025-03-18 PROCEDURE — 81001 URINALYSIS AUTO W/SCOPE: CPT | Performed by: EMERGENCY MEDICINE

## 2025-03-18 PROCEDURE — 0241U SARS-COV-2/FLU A AND B/RSV BY PCR (GENEXPERT): CPT | Performed by: EMERGENCY MEDICINE

## 2025-03-18 PROCEDURE — 85025 COMPLETE CBC W/AUTO DIFF WBC: CPT | Performed by: EMERGENCY MEDICINE

## 2025-03-18 PROCEDURE — 87147 CULTURE TYPE IMMUNOLOGIC: CPT | Performed by: EMERGENCY MEDICINE

## 2025-03-18 PROCEDURE — 36415 COLL VENOUS BLD VENIPUNCTURE: CPT

## 2025-03-18 PROCEDURE — 85730 THROMBOPLASTIN TIME PARTIAL: CPT | Performed by: EMERGENCY MEDICINE

## 2025-03-18 PROCEDURE — 71046 X-RAY EXAM CHEST 2 VIEWS: CPT | Performed by: EMERGENCY MEDICINE

## 2025-03-18 PROCEDURE — 87186 SC STD MICRODIL/AGAR DIL: CPT | Performed by: EMERGENCY MEDICINE

## 2025-03-18 PROCEDURE — 96374 THER/PROPH/DIAG INJ IV PUSH: CPT

## 2025-03-18 PROCEDURE — 96361 HYDRATE IV INFUSION ADD-ON: CPT

## 2025-03-18 RX ORDER — FERROUS SULFATE 325(65) MG
325 TABLET, DELAYED RELEASE (ENTERIC COATED) ORAL EVERY OTHER DAY
Status: DISCONTINUED | OUTPATIENT
Start: 2025-03-19 | End: 2025-03-21

## 2025-03-18 RX ORDER — SODIUM CHLORIDE 9 MG/ML
1000 INJECTION, SOLUTION INTRAVENOUS ONCE
Status: COMPLETED | OUTPATIENT
Start: 2025-03-18 | End: 2025-03-18

## 2025-03-18 RX ORDER — AMLODIPINE BESYLATE 5 MG/1
5 TABLET ORAL EVERY EVENING
Status: DISCONTINUED | OUTPATIENT
Start: 2025-03-18 | End: 2025-03-21

## 2025-03-18 RX ORDER — MECLIZINE HYDROCHLORIDE 25 MG/1
25 TABLET ORAL 3 TIMES DAILY PRN
Status: DISCONTINUED | OUTPATIENT
Start: 2025-03-18 | End: 2025-03-21

## 2025-03-18 RX ORDER — SODIUM CHLORIDE 9 MG/ML
INJECTION, SOLUTION INTRAVENOUS CONTINUOUS
Status: ACTIVE | OUTPATIENT
Start: 2025-03-19 | End: 2025-03-19

## 2025-03-18 RX ORDER — DOCUSATE SODIUM 100 MG/1
100 CAPSULE, LIQUID FILLED ORAL EVERY MORNING
Status: DISCONTINUED | OUTPATIENT
Start: 2025-03-19 | End: 2025-03-21

## 2025-03-18 RX ORDER — INSULIN DEGLUDEC 100 U/ML
20 INJECTION, SOLUTION SUBCUTANEOUS NIGHTLY
Status: DISCONTINUED | OUTPATIENT
Start: 2025-03-18 | End: 2025-03-19

## 2025-03-18 RX ORDER — GABAPENTIN 400 MG/1
800 CAPSULE ORAL ONCE
Status: COMPLETED | OUTPATIENT
Start: 2025-03-18 | End: 2025-03-18

## 2025-03-18 RX ORDER — ACETAMINOPHEN 500 MG
1000 TABLET ORAL ONCE
Status: COMPLETED | OUTPATIENT
Start: 2025-03-18 | End: 2025-03-18

## 2025-03-18 RX ORDER — LOSARTAN POTASSIUM 50 MG/1
50 TABLET ORAL DAILY
Status: DISCONTINUED | OUTPATIENT
Start: 2025-03-19 | End: 2025-03-21

## 2025-03-18 RX ORDER — HEPARIN SODIUM 5000 [USP'U]/ML
5000 INJECTION, SOLUTION INTRAVENOUS; SUBCUTANEOUS EVERY 12 HOURS SCHEDULED
Status: DISCONTINUED | OUTPATIENT
Start: 2025-03-18 | End: 2025-03-21

## 2025-03-18 NOTE — ED INITIAL ASSESSMENT (HPI)
Patient arrives with complaint of fevers, cough, runny nose and decreased urinary output. Patient denies nausea, vomiting or diarrhea. Patient states fever started Thursday, went to  Friday and had a [employer] run with negative results. Patient was instructed to be seen if symptoms continued. Patient reports diabetes and states that his right foot has a wound from 2 weeks.

## 2025-03-19 ENCOUNTER — APPOINTMENT (OUTPATIENT)
Dept: CV DIAGNOSTICS | Facility: HOSPITAL | Age: 70
End: 2025-03-19
Attending: HOSPITALIST
Payer: MEDICAID

## 2025-03-19 LAB
ADENOVIRUS PCR:: NOT DETECTED
ANION GAP SERPL CALC-SCNC: 12 MMOL/L (ref 0–18)
B PARAPERT DNA SPEC QL NAA+PROBE: NOT DETECTED
B PERT DNA SPEC QL NAA+PROBE: NOT DETECTED
BUN BLD-MCNC: 20 MG/DL (ref 9–23)
C PNEUM DNA SPEC QL NAA+PROBE: NOT DETECTED
CALCIUM BLD-MCNC: 8.8 MG/DL (ref 8.7–10.6)
CHLORIDE SERPL-SCNC: 102 MMOL/L (ref 98–112)
CO2 SERPL-SCNC: 24 MMOL/L (ref 21–32)
CORONAVIRUS 229E PCR:: NOT DETECTED
CORONAVIRUS HKU1 PCR:: NOT DETECTED
CORONAVIRUS NL63 PCR:: NOT DETECTED
CORONAVIRUS OC43 PCR:: NOT DETECTED
CREAT BLD-MCNC: 0.94 MG/DL
CRP SERPL-MCNC: 22.6 MG/DL (ref ?–0.5)
EGFRCR SERPLBLD CKD-EPI 2021: 88 ML/MIN/1.73M2 (ref 60–?)
ERYTHROCYTE [DISTWIDTH] IN BLOOD BY AUTOMATED COUNT: 14.6 %
ERYTHROCYTE [SEDIMENTATION RATE] IN BLOOD: 75 MM/HR
FLUAV RNA SPEC QL NAA+PROBE: NOT DETECTED
FLUBV RNA SPEC QL NAA+PROBE: NOT DETECTED
GLUCOSE BLD-MCNC: 104 MG/DL (ref 70–99)
GLUCOSE BLD-MCNC: 138 MG/DL (ref 70–99)
GLUCOSE BLD-MCNC: 146 MG/DL (ref 70–99)
GLUCOSE BLD-MCNC: 152 MG/DL (ref 70–99)
GLUCOSE BLD-MCNC: 161 MG/DL (ref 70–99)
GLUCOSE BLD-MCNC: 162 MG/DL (ref 70–99)
HCT VFR BLD AUTO: 30.9 %
HGB BLD-MCNC: 10.1 G/DL
MCH RBC QN AUTO: 27.8 PG (ref 26–34)
MCHC RBC AUTO-ENTMCNC: 32.7 G/DL (ref 31–37)
MCV RBC AUTO: 85.1 FL
METAPNEUMOVIRUS PCR:: NOT DETECTED
MYCOPLASMA PNEUMONIA PCR:: NOT DETECTED
NT-PROBNP SERPL-MCNC: 619 PG/ML (ref ?–125)
OSMOLALITY SERPL CALC.SUM OF ELEC: 292 MOSM/KG (ref 275–295)
PARAINFLUENZA 1 PCR:: NOT DETECTED
PARAINFLUENZA 2 PCR:: NOT DETECTED
PARAINFLUENZA 3 PCR:: NOT DETECTED
PARAINFLUENZA 4 PCR:: NOT DETECTED
PLATELET # BLD AUTO: 216 10(3)UL (ref 150–450)
POTASSIUM SERPL-SCNC: 3.6 MMOL/L (ref 3.5–5.1)
RBC # BLD AUTO: 3.63 X10(6)UL
RHINOVIRUS/ENTERO PCR:: NOT DETECTED
RSV RNA SPEC QL NAA+PROBE: NOT DETECTED
SARS-COV-2 RNA NPH QL NAA+NON-PROBE: NOT DETECTED
SODIUM SERPL-SCNC: 138 MMOL/L (ref 136–145)
WBC # BLD AUTO: 10.1 X10(3) UL (ref 4–11)

## 2025-03-19 PROCEDURE — 86140 C-REACTIVE PROTEIN: CPT | Performed by: HOSPITALIST

## 2025-03-19 PROCEDURE — 85027 COMPLETE CBC AUTOMATED: CPT | Performed by: STUDENT IN AN ORGANIZED HEALTH CARE EDUCATION/TRAINING PROGRAM

## 2025-03-19 PROCEDURE — 85652 RBC SED RATE AUTOMATED: CPT | Performed by: HOSPITALIST

## 2025-03-19 PROCEDURE — 0202U NFCT DS 22 TRGT SARS-COV-2: CPT | Performed by: HOSPITALIST

## 2025-03-19 PROCEDURE — 82962 GLUCOSE BLOOD TEST: CPT

## 2025-03-19 PROCEDURE — 97162 PT EVAL MOD COMPLEX 30 MIN: CPT

## 2025-03-19 PROCEDURE — 80048 BASIC METABOLIC PNL TOTAL CA: CPT | Performed by: STUDENT IN AN ORGANIZED HEALTH CARE EDUCATION/TRAINING PROGRAM

## 2025-03-19 PROCEDURE — 83880 ASSAY OF NATRIURETIC PEPTIDE: CPT | Performed by: HOSPITALIST

## 2025-03-19 PROCEDURE — 97530 THERAPEUTIC ACTIVITIES: CPT

## 2025-03-19 PROCEDURE — 97165 OT EVAL LOW COMPLEX 30 MIN: CPT

## 2025-03-19 RX ORDER — ACETAMINOPHEN 325 MG/1
650 TABLET ORAL EVERY 6 HOURS PRN
Status: DISCONTINUED | OUTPATIENT
Start: 2025-03-19 | End: 2025-03-21

## 2025-03-19 RX ORDER — INSULIN DEGLUDEC 100 U/ML
20 INJECTION, SOLUTION SUBCUTANEOUS DAILY
Status: DISCONTINUED | OUTPATIENT
Start: 2025-03-19 | End: 2025-03-21

## 2025-03-19 RX ORDER — FUROSEMIDE 10 MG/ML
40 INJECTION INTRAMUSCULAR; INTRAVENOUS ONCE
Status: COMPLETED | OUTPATIENT
Start: 2025-03-19 | End: 2025-03-19

## 2025-03-19 RX ORDER — GABAPENTIN 400 MG/1
800 CAPSULE ORAL 3 TIMES DAILY
Status: DISCONTINUED | OUTPATIENT
Start: 2025-03-19 | End: 2025-03-21

## 2025-03-19 RX ORDER — METRONIDAZOLE 500 MG/1
500 TABLET ORAL 2 TIMES DAILY
Status: DISCONTINUED | OUTPATIENT
Start: 2025-03-19 | End: 2025-03-21

## 2025-03-19 RX ORDER — INSULIN DEGLUDEC 100 U/ML
20 INJECTION, SOLUTION SUBCUTANEOUS NIGHTLY
Status: DISCONTINUED | OUTPATIENT
Start: 2025-03-19 | End: 2025-03-21

## 2025-03-19 RX ORDER — ACETAMINOPHEN 325 MG/1
650 TABLET ORAL ONCE
Status: COMPLETED | OUTPATIENT
Start: 2025-03-19 | End: 2025-03-19

## 2025-03-19 RX ORDER — VANCOMYCIN 1.75 GRAM/500 ML IN 0.9 % SODIUM CHLORIDE INTRAVENOUS
15 EVERY 12 HOURS
Status: DISCONTINUED | OUTPATIENT
Start: 2025-03-19 | End: 2025-03-21

## 2025-03-19 RX ORDER — POTASSIUM CHLORIDE 1500 MG/1
40 TABLET, EXTENDED RELEASE ORAL EVERY 4 HOURS
Status: COMPLETED | OUTPATIENT
Start: 2025-03-19 | End: 2025-03-20

## 2025-03-19 RX ORDER — ASPIRIN 325 MG
325 TABLET ORAL DAILY
Status: DISCONTINUED | OUTPATIENT
Start: 2025-03-19 | End: 2025-03-21

## 2025-03-19 RX ORDER — ROSUVASTATIN CALCIUM 20 MG/1
20 TABLET, COATED ORAL NIGHTLY
Status: DISCONTINUED | OUTPATIENT
Start: 2025-03-19 | End: 2025-03-21

## 2025-03-19 NOTE — PAYOR COMM NOTE
--------------  ADMISSION REVIEW     Payor: [facility]  Subscriber #:  [id number]  Authorization Number: [id number]    Admit date: 3/18/25  Admit time: 11:46 PM       REVIEW DOCUMENTATION:     ED Provider Notes        ED Provider Notes signed by Dayday Montero MD at 3/18/2025 11:54 PM        Patient Seen in: [facility] Emergency Department      History     Chief Complaint   Patient presents with    Fever    Body ache and/or chills     Stated Complaint: fever,chills    Subjective:   HPI      Patient is 69-year-old male presents emergency room with history of complaints of fever, chills, that have been ongoing since last week.  The patient was seen at the immediate care and had a workup done including a Genex with negative results at that time.  The patient has had cough and cold symptoms with runny nose over the last several days he has decreased urinary output.  The patient denies any vomiting or diarrhea.  The patient denies chest pain or abdominal pain.  The patient was instructed to come to the ER if symptoms persist.  The patient also has a wound to the right foot which has been there for couple of weeks.  The patient denies history of any back pain.  The patient denies history of any ill contacts at home.  The patient denies history of any recent antibiotic use.    Objective:     Past Medical History:    Anxiety state    Cellulitis    Cellulitis and abscess of foot, except toes    Charcot foot due to diabetes mellitus (HCC)    Charcot's joint disease due to secondary diabetes    Chronic osteomyelitis of left tibia with draining sinus (HCC)    Essential hypertension    Exposed orthopaedic hardware    Lt foot after Charcot joint surgery revision.  Hardware removed 2018    Heart murmur    High blood pressure    High cholesterol    Hypertriglyceridemia    Hypokalemia    Hypokalemia    Methicillin susceptible Staphylococcus aureus infection    Neuropathy    Obesity, unspecified     Osteoarthrosis, unspecified whether generalized or localized, unspecified site    Osteomyelitis of ankle or foot, acute (HCC)    post-surgical,  at [place]    Osteomyelitis of ankle or foot, acute, left (HCC)    OSTEOPENIA    Other and unspecified hyperlipidemia    RLS (restless legs syndrome)    Stroke (HCC)    Type II or unspecified type diabetes mellitus without mention of complication, not stated as uncontrolled    Visual impairment    reading glasses           Physical Exam     ED Triage Vitals [03/18/25 1903]   /60   Pulse 74   Resp 18   Temp 100 °F (37.8 °C)   Temp src Oral   SpO2 99 %   O2 Device None (Room air)       Current Vitals:   Vital Signs  BP: 132/57  Pulse: 68  Resp: 18  Temp: 100 °F (37.8 °C)  Temp src: Oral  MAP (mmHg): 77    Oxygen Therapy  SpO2: 97 %  O2 Device: None (Room air)        Physical Exam  GENERAL: Well-developed, well-nourished male sitting up breathing easily in no apparent distress.  Patient is nontoxic appearance.  HEENT: Head is normocephalic, atraumatic. Pupils are 4 mm equally round and reactive to light. Oropharynx is clear. Mucous membranes are somewhat dry.  NECK: There is no JVD. No meningeal signs or nuchal rigidity appreciated. No stridor.  LUNGS: Clear to auscultation bilaterally with no wheeze. There is good equal air entry bilaterally.  HEART: Regular rate and rhythm. Normal S1, S2 no S3, or S4. No murmur.  ABDOMEN: There is no focal tenderness to palpation appreciated anywhere throughout the abdomen. There is no guarding, no rebound, no mass, and no organomegaly appreciated. There is normoactive bowel sounds.   EXTREMITIES: There is no cyanosis, clubbing,  appreciated. Pulses are 2+ and equal in all 4 extremities.  There is chronic edema noted to both lower extremities.  There is a wound noted to the medial aspect of the right foot with surrounding erythema consistent with cellulitis and infected wound appreciated.  NEURO: Patient is awake, alert and oriented  to time place and person. Motor strength is 5 over 5 in all 4 extremities. There are no gross motor or sensory deficits appreciated.  Patient answering all questions appropriately        ED Course     Labs Reviewed   CBC WITH DIFFERENTIAL WITH PLATELET - Abnormal; Notable for the following components:       Result Value    WBC 12.7 (*)     HGB 11.9 (*)     HCT 35.8 (*)     Neutrophil Absolute Prelim 9.67 (*)     Neutrophil Absolute 9.67 (*)     Monocyte Absolute 1.32 (*)     All other components within normal limits   COMP METABOLIC PANEL (14) - Abnormal; Notable for the following components:    AST 43 (*)     ALT 53 (*)     All other components within normal limits   URINALYSIS WITH CULTURE REFLEX - Abnormal; Notable for the following components:    Protein Urine 50 (*)     Bacteria Urine Rare (*)     All other components within normal limits   PROTHROMBIN TIME (PT) - Abnormal; Notable for the following components:    PT 15.9 (*)     INR 1.26 (*)     All other components within normal limits   PTT, ACTIVATED - Abnormal; Notable for the following components:    PTT 37.4 (*)     All other components within normal limits   LACTIC ACID, PLASMA - Normal   POCT GLUCOSE - Normal   SARS-COV-2/FLU A AND B/RSV BY PCR (GENEXPERT) - Normal    Narrative:     This test is intended for the qualitative detection and differentiation of SARS-CoV-2, influenza A, influenza B, and respiratory syncytial virus (RSV) viral RNA in nasopharyngeal or nares swabs from individuals suspected of respiratory viral infection consistent with COVID-19 by their healthcare provider. Signs and symptoms of respiratory viral infection due to SARS-CoV-2, influenza, and RSV can be similar.    Test performed using the Xpert Xpress SARS-CoV-2/FLU/RSV (real time RT-PCR)  assay on the [employer] instrument, [employer], [employer] [address].   This test is being used under the Food and Drug Administration's Emergency Use Authorization.    The authorized Fact Sheet  for Healthcare Providers for this assay is available upon request from the laboratory.   BASIC METABOLIC PANEL (8)   CBC, PLATELET; NO DIFFERENTIAL   RAINBOW DRAW LAVENDER   RAINBOW DRAW LIGHT GREEN   RAINBOW DRAW BLUE   BLOOD CULTURE   BLOOD CULTURE   AEROBIC BACTERIAL CULTURE            XR FOOT, COMPLETE (MIN 3 VIEWS), RIGHT (CPT=73630)    Result Date: 3/18/2025  CONCLUSION:  There is severe osteoarthritis with flattening of the foot involving the tarsal articulations of the mid and hindfoot.   LOCATION:  [place]   Dictated by (CST): Ivan Paulino MD on 3/18/2025 at 8:46 PM     Finalized by (CST): Ivan Paulino MD on 3/18/2025 at 8:48 PM       XR CHEST PA + LAT CHEST (CPT=71046)    Result Date: 3/18/2025  CONCLUSION:  Cardiomegaly with mild pulmonary vascular congestion and increased interstitial markings lungs is concerning for mild congestive failure with mild pulmonary edema.  Clinical correlation recommended. Hyperinflation of the lungs suggesting the  possibility of underlying COPD and/or asthma. Clinical correlation recommended.    LOCATION:  [facility]   Dictated by (CST): Romario Hammonds MD on 3/18/2025 at 8:47 PM     Finalized by (CST): Romario Hammonds MD on 3/18/2025 at 8:48 PM            MDM      Patient had an IV line established blood work drawn including a CBC, chemistries, BUN/creatinine, and blood sugar showed evidence of some mild leukocytosis no other acute abnormalities noted.  He has mild elevation of liver enzymes here in the emergency room but no complaints of any abdominal pain.  Coags are unremarkable.  Lactic acid found to be normal.  Glucose found to be normal.  Urinalysis is unremarkable.  COVID, flu, and RSV are all negative.  The patient had blood and urine cultures obtained here in the emergency room with the results of which are pending at this time.  Patient also had a wound culture done here in the ER the results of which are also pending at this time.  Patient was given IV Ancef after  discussion with duly hospitalist and the patient will be admitted to the hospital for further workup at this time.  Patient had no further new complaints throughout the rest emergency room stay.  Patient was admitted for further care at this time.    Admission disposition: 3/18/2025 11:04 PM           Medical Decision Making      Disposition and Plan     Clinical Impression:  1. Cellulitis of right lower extremity    2. Fever, unspecified fever cause         Disposition:  Admit  3/18/2025 11:04 pm     Hospital Problems       Present on Admission  Date Reviewed: 3/14/2025            ICD-10-CM Noted POA    * (Principal) Cellulitis of right lower extremity L03.115 3/18/2025 Unknown    Fever, unspecified fever cause R50.9 3/18/2025 Unknown            Signed by Dayday Montero MD on 3/18/2025 11:54 PM             CONSULT     Consults signed by Gurmeet Cabrales MD at 3/19/2025 10:41 AM    1. Consult to Infectious Disease [[id number]] ordered by Katelynn Perry MD at 03/19/25 0810         Infectious Disease Initial Consultation        Date of admission: 3/18/2025  7:07 PM      Date of service: 03/19/25 9:07 AM     Consult requested by: Katelynn Perry*     Reason for consult: Infected right foot wound, fever     Chief complaint: Fever and chills, right foot wound     History of present illness: Delmar Hernández is a 69 year old male with history of hypertension, type 2 diabetes, right foot wound for the last couple weeks, who presents here with fevers and chills over the last week.  The patient was seen in immediate care and had a negative workup.  He did complain of some upper respiratory symptoms.  No vomiting or diarrhea.  No abdominal pain.  No chest pain.  Upon evaluation in the ED, he was noted to have cellulitis of the right lower extremity hence was admitted to the hospital.     Upon presentation here, the patient was afebrile, hemodynamically stable.  Labs revealed leukocytosis with count of  12.7 with a left shift.  BMP was unremarkable.  LFTs showed mild transaminitis with an AST of 43 and ALT 53.  UA without pyuria.  Cultures from his wound were obtained and currently pending.  X-ray of the right foot showed severe osteoarthritis, otherwise no osteomyelitis.  Chest x-ray did not show any pneumonia.  The patient was started on IV cefazolin and admitted to the medical floor.  ID were consulted for antibiotic recommendations.     Medications:    influenza virus vaccine PF    insulin degludec    acetaminophen    aspirin    gabapentin    rosuvastatin    insulin degludec    cefepime    vancomycin    metroNIDAZOLE    heparin    losartan    meclizine    amLODIPine    docusate sodium    ferrous sulfate    insulin aspart      Allergies:  [Allergies]    [Allergies]        Allergen Reactions    Coffee Bean Extract [Coffea Arabica] OTHER (SEE COMMENTS)       Vision changes  Gives blurry vision        Physical Exam:      Vitals:     03/19/25 0344   BP: 110/44   Pulse: 61   Resp: 14   Temp: 99.3 °F (37.4 °C)      Vitals signs and nursing note reviewed.   Constitutional:       Appearance: Normal appearance.   HENT:      Head: Normocephalic and atraumatic.      Mouth: Mucous membranes are moist.   Neck:      Musculoskeletal: Neck supple.   Cardiovascular:      Rate and Rhythm: Normal rate.      Heart sounds: Normal heart sounds. No murmur. No friction rub. No gallop.    Pulmonary:      Effort: Pulmonary effort is normal. No respiratory distress.      Breath sounds: Normal breath sounds. No stridor. No wheezing, rhonchi or rales.   Chest:      Chest wall: No tenderness.   Abdominal:      General: Abdomen is flat. There is no distension.      Palpations: Abdomen is soft. There is no mass.      Tenderness: There is no tenderness. There is no guarding or rebound.      Hernia: No hernia is present.   Musculoskeletal:      Right lower leg: No edema.      Left lower leg: Right-sided diabetic foot infection with foul-smelling  drainage, swelling and cellulitis  Skin:     General: Skin is warm and dry.   Neurological:      General: No focal deficit present.      Mental Status: Alert and oriented to person, place, and time.     Laboratory data:  I have independently reviewed all lab results; including old microbiological results.        Lab Results   Component Value Date     WBC 10.1 03/19/2025     HGB 10.1 03/19/2025     HCT 30.9 03/19/2025     .0 03/19/2025     CREATSERUM 0.94 03/19/2025     BUN 20 03/19/2025      03/19/2025     K 3.6 03/19/2025      03/19/2025     CO2 24.0 03/19/2025      03/19/2025     CA 8.8 03/19/2025     ALB 4.4 03/18/2025     ALKPHO 82 03/18/2025     BILT 0.9 03/18/2025     TP 7.8 03/18/2025     AST 43 03/18/2025     ALT 53 03/18/2025     PTT 37.4 03/18/2025     INR 1.26 03/18/2025              Recent Labs   Lab 03/18/25  1941 03/19/25  0446   RBC 4.26 3.63*   HGB 11.9* 10.1*   HCT 35.8* 30.9*   MCV 84.0 85.1   MCH 27.9 27.8   MCHC 33.2 32.7   RDW 14.8 14.6   NEPRELIM 9.67*  --    WBC 12.7* 10.1   .0 216.0         Microbiology data:  No results found for this visit on 03/18/25.        Radiology:  I have reviewed all imaging data available independently.   X-ray of the right foot:  Osteoarthritis.  No osteomyelitis     Impression:  Delmar Hernández is a 69 year old male with      Right-sided diabetic foot infection with threat to limb, presenting here with fevers and leukocytosis concerning for evolving sepsis with threat to life  Wound cultures are pending  In the past, the patient had grown Acinetobacter and MSSA  Currently on IV cefazolin  Would benefit from an MRI  Type 2 diabetes  Will need optimal control     Recommendations:      Follow-up on wound cultures  Discontinue cefazolin and start IV cefepime, metronidazole anna  The patient would like to be transferred over to [place] as his care is over there.  Recommend obtaining an MRI of the right foot and podiatry consultation if  the patient wishes to stay here  Continue to monitor daily labs for antibiotic toxicity  Further recommendations will depend on the above work-up and clinical progress      The plan of care was discussed with the primary hospital team, Katelynn Perry*     Recommendations were also discussed with the patient; all questions were answered.     Thank you for this consultation. Please don't hesitate to call the ID team for questions or any acute changes in patient's clinical condition.     Please note that this report has been produced using speech recognition software and may contain errors related to that system including, but not limited to, errors in grammar, punctuation, and spelling, as well as words and phrases that possibly may have been recognized inappropriately.  If there are any questions or concerns, contact the dictating provider for clarification.     MD Adam Dohertyamarjit Hernández : 1955 MRN: [id number] CSN: [id number]                MEDICATIONS ADMINISTERED IN LAST 1 DAY:  acetaminophen (Tylenol) tab 650 mg       Date Action Dose Route User    3/19/2025 1031 Given 650 mg Oral Nelson Flanagan RN          acetaminophen (Tylenol Extra Strength) tab 1,000 mg       Date Action Dose Route User    3/18/2025 2109 Given 1,000 mg Oral Allegra Barnes RN          aspirin tab 325 mg       Date Action Dose Route User    3/19/2025 1031 Given 325 mg Oral Nelson Flanagan RN          ceFAZolin (Ancef) 2g in 10mL IV syringe premix       Date Action Dose Route User    3/18/2025 2330 New Bag 2 g Intravenous Allegra Barnes RN          ceFAZolin (Ancef) 2g in 10mL IV syringe premix       Date Action Dose Route User    3/19/2025 0642 New Bag 2 g Intravenous Meagan Lewis RN          ceFEPIme (Maxipime) 1 g in sodium chloride 0.9% 100 mL IVPB-MBP       Date Action Dose Route User    3/19/2025 1243 New Bag 1 g Intravenous Nelson Flanagan RN          docusate sodium (Colace) cap 100 mg       Date Action  Dose Route User    3/19/2025 1031 Given 100 mg Oral Nelson Flanagan RN          ferrous sulfate DR tab 325 mg       Date Action Dose Route User    3/19/2025 1030 Given 325 mg Oral Nelson Flanagan RN          gabapentin (Neurontin) cap 800 mg       Date Action Dose Route User    3/18/2025 2239 Given 800 mg Oral Allegra Barnes RN          gabapentin (Neurontin) cap 800 mg       Date Action Dose Route User    3/19/2025 1030 Given 800 mg Oral Nelson Flanagan RN          insulin aspart (NovoLOG) 100 Units/mL FlexPen 2-10 Units       Date Action Dose Route User    3/19/2025 1241 Given 2 Units Subcutaneous (Left Upper Abdomen) Nelson Flanagan RN          insulin degludec (Tresiba) 100 units/mL flextouch 20 Units       Date Action Dose Route User    3/19/2025 1031 Given 20 Units Subcutaneous (Left Lower Abdomen) Nelson Flanagan RN          losartan (Cozaar) tab 50 mg       Date Action Dose Route User    3/19/2025 1030 Given 50 mg Oral Nelson Flanagan RN          sodium chloride 0.9% infusion 1,000 mL       Date Action Dose Route User    3/18/2025 1948 New Bag 1,000 mL Intravenous Allegra Barnes RN          vancomycin (Vancocin) 1.75 g in sodium chloride 0.9% 500mL IVPB premix       Date Action Dose Route User    3/19/2025 1032 New Bag 1,750 mg Intravenous Nelson Flanagan RN            Vitals (last day)       Date/Time Temp Pulse Resp BP SpO2 Weight O2 Device O2 Flow Rate (L/min) [facility]    03/19/25 0816 99.7 °F (37.6 °C) 61 16 131/77 96 % -- None (Room air) -- MB    03/19/25 0344 99.3 °F (37.4 °C) 61 14 110/44 98 % -- None (Room air) 0 L/min KE    03/18/25 2354 -- -- -- -- -- 261 lb (118.4 kg) -- --     03/18/25 2354 99.6 °F (37.6 °C) 66 16 127/42 94 % -- None (Room air) 0 L/min KE    03/18/25 2330 -- 68 18 132/57 97 % -- None (Room air) -- DV    03/18/25 2215 -- 72 20 118/43 99 % -- -- -- DV    03/18/25 2115 -- 69 22 137/58 98 % -- None (Room air) -- DV    03/18/25 1903 100 °F (37.8 °C) 74 18 128/60 99 % -- None (Room air)  -- SR

## 2025-03-19 NOTE — PROGRESS NOTES
[facility] Pharmacy Dosing Service      Initial Pharmacokinetic Consult for Vancomycin Dosing     Delmar Hernández is a 69 year old male who is being initiated on vancomycin therapy for cellulitis.  Pharmacy has been asked to dose vancomycin by .  The initial treatment and monitoring approach will be steady state AUC strategy.        Weight and Temperature:    Wt Readings from Last 1 Encounters:   25 118.4 kg (261 lb)        Temp Readings from Last 1 Encounters:   25 99.7 °F (37.6 °C) (Oral)      Labs:   Recent Labs   Lab 25  0446   CREATSERUM 1.11 0.94      Estimated Creatinine Clearance: 87.4 mL/min (based on SCr of 0.94 mg/dL).     Recent Labs   Lab 25  0446   WBC 12.7* 10.1          The Pharmacokinetic Target is:     to 600 mg-h/L and trough <=15 mg/L    Renal Dosing Considerations:    None     Assessment/Plan:   Initial/Loading dose: Will receive 1750 mg IV (15 mg/kg, capped at 2250 mg) x 1 initial dose.      Maintenance dose: Pharmacy will dose vancomycin at 1750 mg IV every 12 hours    Monitorin) Plan for vancomycin peak and trough to be obtained at steady state    2) Pharmacy will order SCr as clinically indicated to assess renal function.    3) Pharmacy will monitor for toxicity and efficacy, adjust vancomycin dose and/or frequency, and order vancomycin levels as appropriate per the Pharmacy and Therapeutics Committee approved protocol until discontinuation of the medication.       We appreciate the opportunity to assist in the care of this patient.     Aga WHITE RPH  3/19/2025  9:44 AM  [facility] Extension: [phone]

## 2025-03-19 NOTE — PHYSICAL THERAPY NOTE
PHYSICAL THERAPY EVALUATION - INPATIENT     Room Number: 381/381-A  Evaluation Date: 3/19/2025  Type of Evaluation: Initial  Physician Order: PT Eval and Treat    Presenting Problem: R le cellulitis  Co-Morbidities : DMII, HTN, HL, chronic foot deformities  Reason for Therapy: Mobility Dysfunction and Discharge Planning    PHYSICAL THERAPY ASSESSMENT   Patient is a 69 year old male admitted 3/18/2025 for R le cellulitis.  Prior to admission, patient's baseline is mod I for gait w/o device, independent w/ adl's, works full time and drives.  Patient is currently functioning below baseline with transfers and gait.  Patient is requiring contact guard assist as a result of the following impairments: decreased functional strength, pain, impaired standing balance, decreased muscular endurance, and medical status.  Physical Therapy will continue to follow for duration of hospitalization.    Patient will benefit from continued skilled PT Services with no additional skilled services but increased support at home.  May require rw prior to d/c to assist w/ pain management R le.    PLAN DURING HOSPITALIZATION  Nursing Mobility Recommendation : 1 Assist     PT Treatment Plan: Bed mobility, Patient education, Family education, Gait training, Strengthening, Transfer training, Balance training  Rehab Potential : Good  Frequency (Obs): 3-5x/week     CURRENT GOALS    Goal #1 Patient is able to demonstrate supine - sit EOB @ level: modified independent     Goal #2 Patient is able to demonstrate transfers EOB to/from C at assistance level: modified independent     Goal #3 Patient is able to ambulate 150 feet with assist device:  LRAD  at assistance level: modified independent     Goal #4    Goal #5    Goal #6    Goal Comments: Goals established on 3/19/2025      PHYSICAL THERAPY MEDICAL/SOCIAL HISTORY  History related to current admission: Patient is a 69 year old male admitted on 3/18/2025 from home for fever, R le wound and  decreased appetite.  Pt diagnosed with R le cellulits.    HOME SITUATION  Type of Home: Condo  Home Layout: One level  Stairs to Enter : 0        Stairs to Bedroom: 0         Lives With:  (sister)    Drives: Yes   Patient Regularly Uses: Reading glasses      Prior Level of [place]: Pt is typically independent w/ adl's, gait w/o device, drives and works full time as a professional .  Pt has new brace for L Charcot joint to be worn w/ any weight bearing activities.      SUBJECTIVE  \"I don't want to walk on my R foot until I have some protection for it - either my shoe or a cast shoe.\"  Informed rn during session to order pt a surgical shoe.    OBJECTIVE  Precautions: Other (Comment) (Boot to be worn on L le)  Fall Risk: Standard fall risk    WEIGHT BEARING RESTRICTION     PAIN ASSESSMENT  Ratin  Location: R le  Management Techniques: Activity promotion, Repositioning, Other (Comment) (Surgical shoe for walking)    COGNITION  Overall Cognitive Status:  WFL - within functional limits    RANGE OF MOTION AND STRENGTH ASSESSMENT  Upper extremity ROM and strength -see OT evaluation    Lower extremity ROM is within functional limits except for the following: limited range of ~ 10 degrees total at L ankle.  R ankle, able to achieve neutral dorsiflexion    Lower extremity strength is within functional limits - 4-/5 at hips, 4/5 at quads, hams    BALANCE  Static Sitting: Good  Dynamic Sitting: Good  Static Standing: Fair -  Dynamic Standing: Poor +    AM-PAC '6-Clicks' INPATIENT SHORT FORM - BASIC MOBILITY  How much difficulty does the patient currently have...  Patient Difficulty: Turning over in bed (including adjusting bedclothes, sheets and blankets)?: None   Patient Difficulty: Sitting down on and standing up from a chair with arms (e.g., wheelchair, bedside commode, etc.): A Little   Patient Difficulty: Moving from lying on back to sitting on the side of the bed?: None   How much help from another  person does the patient currently need...   Help from Another: Moving to and from a bed to a chair (including a wheelchair)?: A Little   Help from Another: Need to walk in hospital room?: A Little   Help from Another: Climbing 3-5 steps with a railing?: A Little     AM-PAC Score:  Raw Score: 20   Approx Degree of Impairment: 35.83%   Standardized Score (AM-PAC Scale): 47.67   CMS Modifier (G-Code): CJ    FUNCTIONAL ABILITY STATUS  Gait Assessment   Functional Mobility/Gait Assessment  Gait Assistance: Contact guard assist  Distance (ft): 1  Assistive Device: None  Pattern: Shuffle    Skilled Therapy Provided     Bed Mobility:  Rolling: Left w/ hob flat- mod I  Supine to sit: Mod I w/ hob flat.  Pt able to use ue's and scoot himself up the eob w/ supervision assist  Sit to supine: NT     Transfer Mobility:  Sit to stand: Pt performed w/ CGA, did not fully achieve upright posture as he completed transfer to chair.  Pt reaching for support from bed and armrest of chair.  Stand to sit: CGA, pt using proper safety technique.  Gait = Pt completed shuffle steps as a squat pivot transfers to bedside chair.  Discussed w/ pt using rw as a pain management technique once he gets the surgical shoe.  Pt open to idea of using walker as pain management technique.    Therapist's Comments: Pt reports feeling comfortable in bedside recliner.  Encouraged to remain up during most of day, meals in particular.    Exercise/Education Provided:  Bed mobility  Functional activity tolerated  Transfer training    Patient End of Session: Up in chair, Needs met, Call light within reach, RN aware of session/findings, All patient questions and concerns addressed (Mo Damon aware of eval session)      Patient Evaluation Complexity Level:  History Moderate - 1 or 2 personal factors and/or co-morbidities   Examination of body systems Moderate - addressing a total of 3 or more elements   Clinical Presentation  Moderate - Evolving   Clinical Decision  Making Moderate Complexity       PT Session Time: 24 minutes  Gait Trainin minutes  Therapeutic Activity: 9 minutes  Neuromuscular Re-education: 0 minutes  Therapeutic Exercise: 0 minutes

## 2025-03-19 NOTE — ED QUICK NOTES
Orders for admission, patient is aware of plan and ready to go upstairs. Any questions, please call ED RN Allegra at extension [phone].     Patient Covid vaccination status: Fully vaccinated     COVID Test Ordered in ED: SARS-CoV-2/Flu A and B/RSV by PCR (GeneXpert)    COVID Suspicion at Admission: N/A    Running Infusions:      Mental Status/LOC at time of transport: a/ox4    Other pertinent information: Hx type 2 diabetes  CIWA score: N/A   NIH score:  N/A

## 2025-03-19 NOTE — H&P
[facility] Hospitalist History and Physical      Chief Complaint   Patient presents with    Fever    Body ache and/or chills        PCP: Randall Almendarez MD      History of Present Illness: Patient is a 69 year old male with PMH sig for DM2, charcot foot deformity, HTN, HL, RLS, hx chronic osteomyelitis/superficial L heel wounds, hx CVA p/w fevers, cough and R foot wound. Symptoms started on Thursday and he was evaluated at [facility] on Friday who told him he may have viral URI and advised supportive cares. He also reports decreased UO. Denies diarrhea/vomiting. Also reports R foot wound that has been there for several weeks. He reports he recently had echo done through his cardiologist Dr. Chapincito Smith, no   In the ED Tmax 100 F. Labs with minimal transaminitis, leukocytosis. CXR with mild PVC/pulm edema suggestive of mild congestive heart failure. Started on cefazolin and admitted for further evaluation and treatment.     Past Medical History:    Anxiety state    Cellulitis    Cellulitis and abscess of foot, except toes    Charcot foot due to diabetes mellitus (HCC)    Charcot's joint disease due to secondary diabetes    Chronic osteomyelitis of left tibia with draining sinus ([facility])    Essential hypertension    Exposed orthopaedic hardware    Lt foot after Charcot joint surgery revision.  Hardware removed 2018    Heart murmur    High blood pressure    High cholesterol    Hypertriglyceridemia    Hypokalemia    Hypokalemia    Methicillin susceptible Staphylococcus aureus infection    Neuropathy    Obesity, unspecified    Osteoarthrosis, unspecified whether generalized or localized, unspecified site    Osteomyelitis of ankle or foot, acute (HCC)    post-surgical,  at [facility]    Osteomyelitis of ankle or foot, acute, left ([facility])    OSTEOPENIA    Other and unspecified hyperlipidemia    RLS (restless legs syndrome)    Stroke ([facility])    Type II or unspecified type diabetes mellitus without mention of complication, not stated as uncontrolled     Visual impairment    reading glasses      Past Surgical History:   Procedure Laterality Date    Colonoscopy N/A 1/24/2024    Procedure: COLONOSCOPY with cold snare polypectomy x4 and endoclip x1;  Surgeon: Joey Adams MD;  Location:  ENDOSCOPY    Hemorrhoidectomy      Open rx patella fx      Open rx periartic fx/disloc elbow      Other surgical history  2011, 2012    bilateral foot surgery for Charcot joint    Tonsillectomy  14 y/o        ALL:  Allergies[1]     No current outpatient medications on file.       Social History     Tobacco Use    Smoking status: Never     Passive exposure: Never    Smokeless tobacco: Never   Substance Use Topics    Alcohol use: No        Fam Hx  Family History   Problem Relation Age of Onset    Heart Disease Father     Diabetes Father     Heart Disorder Mother         CHF    Pulmonary Disease Mother         COPD    Heart Surgery Mother     Breast Cancer Sister 44        stage 0, BrCA gene negative       Review of Systems  Comprehensive ROS reviewed and negative except for what is stated in HPI.      OBJECTIVE:  /44 (BP Location: Left arm)   Pulse 61   Temp 99.3 °F (37.4 °C) (Oral)   Resp 14   Wt 261 lb (118.4 kg)   SpO2 98%   BMI 33.51 kg/m²   General:  Alert, no distress, appears stated age.    Head:  Normocephalic, without obvious abnormality, atraumatic.   Eyes:  Sclera anicteric, No conjunctival pallor, EOMs intact.    Nose: Nares normal. Septum midline. Mucosa normal. No drainage.   Throat: Lips, mucosa, and tongue normal. Teeth and gums normal.   Neck: Supple, symmetrical, trachea midline, no cervical or supraclavicular lymph adenopathy, thyroid: no enlargment/tenderness/nodules appreciated   Lungs:   Clear to auscultation bilaterally. Normal effort   Chest wall:  No tenderness or deformity.   Heart:  Regular rate and rhythm, S1, S2 normal, no murmur, rub or gallop appreciated   Abdomen:   Soft, non-tender. Bowel sounds normal. No masses,  No organomegaly. Non  distended   Extremities: Extremities normal, atraumatic, no cyanosis, trace BLE edema. R medial foot DFU   Skin: Skin color, texture, turgor normal. No rashes or lesions.    Neurologic: Normal strength, no focal deficit appreciated     Data Review:    LABS:   Lab Results   Component Value Date    WBC 10.1 03/19/2025    HGB 10.1 03/19/2025    HCT 30.9 03/19/2025    .0 03/19/2025    CREATSERUM 0.94 03/19/2025    BUN 20 03/19/2025     03/19/2025    K 3.6 03/19/2025     03/19/2025    CO2 24.0 03/19/2025     03/19/2025    CA 8.8 03/19/2025    ALB 4.4 03/18/2025    ALKPHO 82 03/18/2025    BILT 0.9 03/18/2025    TP 7.8 03/18/2025    AST 43 03/18/2025    ALT 53 03/18/2025    PTT 37.4 03/18/2025    INR 1.26 03/18/2025    PTP 15.9 03/18/2025    PGLU 162 03/19/2025       CXR: All imaging personally reviewed.      Radiology: XR FOOT, COMPLETE (MIN 3 VIEWS), RIGHT (CPT=73630)    Result Date: 3/18/2025  PROCEDURE:  XR FOOT, COMPLETE (MIN 3 VIEWS), RIGHT (CPT=73630)  TECHNIQUE:  AP, oblique, and lateral views were obtained.  COMPARISON:  None.  INDICATIONS:  fever,chills  PATIENT STATED HISTORY: (As transcribed by Technologist)  Patient offered no additional history at this time.    FINDINGS:  BONES:  There is pes planus.  There is severe degenerative changes throughout the tarsal bones with loss of joint space height and distortion of the architecture and loss of the arch of the foot.  There is severe degenerative changes with osteophytes involving the talar navicular joint and the calcaneal cuboid joint as well as the articulation with the cuneiforms.  There is only mild degenerative changes at the Lisfranc articulations.  Moderate degenerative change of the 1st MTP joint.  There is osteopenia. SOFT TISSUES:  Calcific tendinopathy at the Achilles insertion with a moderate bony spur on the plantar fascia..  No visible soft tissue swelling. EFFUSION:  None visible. OTHER:  Negative.            CONCLUSION:   There is severe osteoarthritis with flattening of the foot involving the tarsal articulations of the mid and hindfoot.   LOCATION:  [facility]   Dictated by (CST): Ivan Paulino MD on 3/18/2025 at 8:46 PM     Finalized by (CST): Ivan Paulino MD on 3/18/2025 at 8:48 PM       XR CHEST PA + LAT CHEST (CPT=71046)    Result Date: 3/18/2025  PROCEDURE:  XR CHEST PA + LAT CHEST (CPT=71046)  INDICATIONS:  fever,chills  COMPARISON:  [facility] , XR, XR CHEST AP PORTABLE  (CPT=71045), 12/09/2024, 4:05 PM.  TECHNIQUE:  PA and lateral chest radiographs were obtained.  PATIENT STATED HISTORY: (As transcribed by Technologist)  Patient offered no additional history at this time.    FINDINGS:  Cardiomegaly with mild pulmonary vascular congestion and increased interstitial markings lungs is concerning for mild congestive failure with mild pulmonary edema.  Clinical correlation recommended. Hyperinflation of the lungs suggesting the possibility of underlying COPD and/or asthma. Clinical correlation recommended.  Degenerative changes in the spine.  No pleural effusion or pneumothorax.            CONCLUSION:  Cardiomegaly with mild pulmonary vascular congestion and increased interstitial markings lungs is concerning for mild congestive failure with mild pulmonary edema.  Clinical correlation recommended. Hyperinflation of the lungs suggesting the  possibility of underlying COPD and/or asthma. Clinical correlation recommended.    LOCATION:  [facility]   Dictated by (CST): Romario Hammonds MD on 3/18/2025 at 8:47 PM     Finalized by (CST): Romario Hammonds MD on 3/18/2025 at 8:48 PM          Assessment/Plan:     69 year old male with PMH sig for DM2, charcot foot deformity, HTN, HL, RLS, hx chronic osteomyelitis/superficial L heel wounds, hx CVA p/w fevers, cough and R foot wound.     Sepsis POA R foot wound  - cultures pending from ED  - check ESR, CRP - elevated  - consult ID - cefepime, vanc, flagyl  - check expanded viral panel  - will get MRI  and podiatry evaluation  - wound care evaluation  - patient says that if he needs surgical debridement he would only want to have it done by his ortho doc in [place] (Dr. Jose F Teresa) - notified transfer center, process initiated    Mild Acute HFpEF exacerbation   CXR with pulmonary vascular congestion and edema  - check proBNP - 619  - Echo from 12/'24 with G1DD  - will give a dose of IV lasix 40mg and reasses  - daily weights, I/Os    DM2  - ISS/accucheks    Primary HTN  - amlodipine, losartan    Hyperlipidemia  - statin    Hx CVA  - asa, statin      FEN: regular diet, PT/OT  Proph: SCDs, heparin  Code status: Full code    Outpatient records or previous hospital records reviewed.   [facility] hospitalist to continue to follow patient while in house      Glenn Perry MD  [facility]  Hospitalist  Message over [employer]  Pager: [phone]                 [1]   Allergies  Allergen Reactions    Coffee Bean Extract [Coffea Arabica] OTHER (SEE COMMENTS)     Vision changes  Gives blurry vision

## 2025-03-19 NOTE — PLAN OF CARE
Patient is Aox4, denies pain at rest. VSS on RA, slightly febrile this morning, dry dressing to foot, MRI screening completed- MRI to be completed of R foot. IV antibiotics as ordered.     Plan: Patient requesting transfer to [place] as his doctors are there, await word from transfer hub.

## 2025-03-19 NOTE — ED PROVIDER NOTES
Patient Seen in: [facility] Emergency Department      History     Chief Complaint   Patient presents with    Fever    Body ache and/or chills     Stated Complaint: fever,chills    Subjective:   HPI      Patient is 69-year-old male presents emergency room with history of complaints of fever, chills, that have been ongoing since last week.  The patient was seen at the immediate care and had a workup done including a Genex with negative results at that time.  The patient has had cough and cold symptoms with runny nose over the last several days he has decreased urinary output.  The patient denies any vomiting or diarrhea.  The patient denies chest pain or abdominal pain.  The patient was instructed to come to the ER if symptoms persist.  The patient also has a wound to the right foot which has been there for couple of weeks.  The patient denies history of any back pain.  The patient denies history of any ill contacts at home.  The patient denies history of any recent antibiotic use.    Objective:     Past Medical History:    Anxiety state    Cellulitis    Cellulitis and abscess of foot, except toes    Charcot foot due to diabetes mellitus ([facility])    Charcot's joint disease due to secondary diabetes    Chronic osteomyelitis of left tibia with draining sinus ([facility])    Essential hypertension    Exposed orthopaedic hardware    Lt foot after Charcot joint surgery revision.  Hardware removed 2018    Heart murmur    High blood pressure    High cholesterol    Hypertriglyceridemia    Hypokalemia    Hypokalemia    Methicillin susceptible Staphylococcus aureus infection    Neuropathy    Obesity, unspecified    Osteoarthrosis, unspecified whether generalized or localized, unspecified site    Osteomyelitis of ankle or foot, acute (HCC)    post-surgical,  at [place]    Osteomyelitis of ankle or foot, acute, left ([facility])    OSTEOPENIA    Other and unspecified hyperlipidemia    RLS (restless legs syndrome)    Stroke ([facility])    Type II  or unspecified type diabetes mellitus without mention of complication, not stated as uncontrolled    Visual impairment    reading glasses              Past Surgical History:   Procedure Laterality Date    Colonoscopy N/A 1/24/2024    Procedure: COLONOSCOPY with cold snare polypectomy x4 and endoclip x1;  Surgeon: Joey Adams MD;  Location:  ENDOSCOPY    Hemorrhoidectomy      Open rx patella fx      Open rx periartic fx/disloc elbow      Other surgical history  2011, 2012    bilateral foot surgery for Charcot joint    Tonsillectomy  14 y/o                Social History     Socioeconomic History    Marital status: Single   Tobacco Use    Smoking status: Never     Passive exposure: Never    Smokeless tobacco: Never   Vaping Use    Vaping status: Never Used   Substance and Sexual Activity    Alcohol use: No    Drug use: No     Social Drivers of Health     Food Insecurity: No Food Insecurity (12/9/2024)    Food Insecurity     Food Insecurity: Never true   Transportation Needs: No Transportation Needs (12/9/2024)    Transportation Needs     Lack of Transportation: No   Housing Stability: Low Risk  (12/9/2024)    Housing Stability     Housing Instability: No                  Physical Exam     ED Triage Vitals [03/18/25 1903]   /60   Pulse 74   Resp 18   Temp 100 °F (37.8 °C)   Temp src Oral   SpO2 99 %   O2 Device None (Room air)       Current Vitals:   Vital Signs  BP: 132/57  Pulse: 68  Resp: 18  Temp: 100 °F (37.8 °C)  Temp src: Oral  MAP (mmHg): 77    Oxygen Therapy  SpO2: 97 %  O2 Device: None (Room air)        Physical Exam  GENERAL: Well-developed, well-nourished male sitting up breathing easily in no apparent distress.  Patient is nontoxic appearance.  HEENT: Head is normocephalic, atraumatic. Pupils are 4 mm equally round and reactive to light. Oropharynx is clear. Mucous membranes are somewhat dry.  NECK: There is no JVD. No meningeal signs or nuchal rigidity appreciated. No stridor.  LUNGS: Clear to  auscultation bilaterally with no wheeze. There is good equal air entry bilaterally.  HEART: Regular rate and rhythm. Normal S1, S2 no S3, or S4. No murmur.  ABDOMEN: There is no focal tenderness to palpation appreciated anywhere throughout the abdomen. There is no guarding, no rebound, no mass, and no organomegaly appreciated. There is normoactive bowel sounds.   EXTREMITIES: There is no cyanosis, clubbing,  appreciated. Pulses are 2+ and equal in all 4 extremities.  There is chronic edema noted to both lower extremities.  There is a wound noted to the medial aspect of the right foot with surrounding erythema consistent with cellulitis and infected wound appreciated.  NEURO: Patient is awake, alert and oriented to time place and person. Motor strength is 5 over 5 in all 4 extremities. There are no gross motor or sensory deficits appreciated.  Patient answering all questions appropriately        ED Course     Labs Reviewed   CBC WITH DIFFERENTIAL WITH PLATELET - Abnormal; Notable for the following components:       Result Value    WBC 12.7 (*)     HGB 11.9 (*)     HCT 35.8 (*)     Neutrophil Absolute Prelim 9.67 (*)     Neutrophil Absolute 9.67 (*)     Monocyte Absolute 1.32 (*)     All other components within normal limits   COMP METABOLIC PANEL (14) - Abnormal; Notable for the following components:    AST 43 (*)     ALT 53 (*)     All other components within normal limits   URINALYSIS WITH CULTURE REFLEX - Abnormal; Notable for the following components:    Protein Urine 50 (*)     Bacteria Urine Rare (*)     All other components within normal limits   PROTHROMBIN TIME (PT) - Abnormal; Notable for the following components:    PT 15.9 (*)     INR 1.26 (*)     All other components within normal limits   PTT, ACTIVATED - Abnormal; Notable for the following components:    PTT 37.4 (*)     All other components within normal limits   LACTIC ACID, PLASMA - Normal   POCT GLUCOSE - Normal   SARS-COV-2/FLU A AND B/RSV BY PCR  (GENEXPERT) - Normal    Narrative:     This test is intended for the qualitative detection and differentiation of SARS-CoV-2, influenza A, influenza B, and respiratory syncytial virus (RSV) viral RNA in nasopharyngeal or nares swabs from individuals suspected of respiratory viral infection consistent with COVID-19 by their healthcare provider. Signs and symptoms of respiratory viral infection due to SARS-CoV-2, influenza, and RSV can be similar.    Test performed using the Xpert Xpress SARS-CoV-2/FLU/RSV (real time RT-PCR)  assay on the GeneXpert instrument, [employer], [place] [address].   This test is being used under the Food and Drug Administration's Emergency Use Authorization.    The authorized Fact Sheet for Healthcare Providers for this assay is available upon request from the laboratory.   BASIC METABOLIC PANEL (8)   CBC, PLATELET; NO DIFFERENTIAL   RAINBOW DRAW LAVENDER   RAINBOW DRAW LIGHT GREEN   RAINBOW DRAW BLUE   BLOOD CULTURE   BLOOD CULTURE   AEROBIC BACTERIAL CULTURE            XR FOOT, COMPLETE (MIN 3 VIEWS), RIGHT (CPT=73630)    Result Date: 3/18/2025  CONCLUSION:  There is severe osteoarthritis with flattening of the foot involving the tarsal articulations of the mid and hindfoot.   LOCATION:  [facility]   Dictated by (CST): Ivan Paulino MD on 3/18/2025 at 8:46 PM     Finalized by (CST): Ivan Paulino MD on 3/18/2025 at 8:48 PM       XR CHEST PA + LAT CHEST (CPT=71046)    Result Date: 3/18/2025  CONCLUSION:  Cardiomegaly with mild pulmonary vascular congestion and increased interstitial markings lungs is concerning for mild congestive failure with mild pulmonary edema.  Clinical correlation recommended. Hyperinflation of the lungs suggesting the  possibility of underlying COPD and/or asthma. Clinical correlation recommended.    LOCATION:  [id number]   Dictated by (CST): Romario Hammonds MD on 3/18/2025 at 8:47 PM     Finalized by (CST): Romario Hammonds MD on 3/18/2025 at 8:48 PM             MDM       Patient had an IV line established blood work drawn including a CBC, chemistries, BUN/creatinine, and blood sugar showed evidence of some mild leukocytosis no other acute abnormalities noted.  He has mild elevation of liver enzymes here in the emergency room but no complaints of any abdominal pain.  Coags are unremarkable.  Lactic acid found to be normal.  Glucose found to be normal.  Urinalysis is unremarkable.  COVID, flu, and RSV are all negative.  The patient had blood and urine cultures obtained here in the emergency room with the results of which are pending at this time.  Patient also had a wound culture done here in the ER the results of which are also pending at this time.  Patient was given IV Ancef after discussion with duly hospitalist and the patient will be admitted to the hospital for further workup at this time.  Patient had no further new complaints throughout the rest emergency room stay.  Patient was admitted for further care at this time.    Admission disposition: 3/18/2025 11:04 PM           Medical Decision Making      Disposition and Plan     Clinical Impression:  1. Cellulitis of right lower extremity    2. Fever, unspecified fever cause         Disposition:  Admit  3/18/2025 11:04 pm    Follow-up:  No follow-up provider specified.        Medications Prescribed:  Current Discharge Medication List              Supplementary Documentation:         Hospital Problems       Present on Admission  Date Reviewed: 3/14/2025            ICD-10-CM Noted POA    * (Principal) Cellulitis of right lower extremity L03.115 3/18/2025 Unknown    Fever, unspecified fever cause R50.9 3/18/2025 Unknown

## 2025-03-19 NOTE — CONSULTS
Infectious Disease Initial Consultation      Date of admission: 3/18/2025  7:07 PM     Date of service: 03/19/25 9:07 AM    Consult requested by: Katelynn Perry*    Reason for consult: Infected right foot wound, fever    Chief complaint: Fever and chills, right foot wound    History of present illness: Delmar Hernández is a 69 year old male with history of hypertension, type 2 diabetes, right foot wound for the last couple weeks, who presents here with fevers and chills over the last week.  The patient was seen in immediate care and had a negative workup.  He did complain of some upper respiratory symptoms.  No vomiting or diarrhea.  No abdominal pain.  No chest pain.  Upon evaluation in the ED, he was noted to have cellulitis of the right lower extremity hence was admitted to the hospital.    Upon presentation here, the patient was afebrile, hemodynamically stable.  Labs revealed leukocytosis with count of 12.7 with a left shift.  BMP was unremarkable.  LFTs showed mild transaminitis with an AST of 43 and ALT 53.  UA without pyuria.  Cultures from his wound were obtained and currently pending.  X-ray of the right foot showed severe osteoarthritis, otherwise no osteomyelitis.  Chest x-ray did not show any pneumonia.  The patient was started on IV cefazolin and admitted to the medical floor.  ID were consulted for antibiotic recommendations.    Review of systems:  All other components of the review of systems are negative, except those described in the history of present illness.     Past Medical History:    Anxiety state    Cellulitis    Cellulitis and abscess of foot, except toes    Charcot foot due to diabetes mellitus (HCC)    Charcot's joint disease due to secondary diabetes    Chronic osteomyelitis of left tibia with draining sinus (HCC)    Essential hypertension    Exposed orthopaedic hardware    Lt foot after Charcot joint surgery revision.  Hardware removed 2018    Heart murmur    High blood  pressure    High cholesterol    Hypertriglyceridemia    Hypokalemia    Hypokalemia    Methicillin susceptible Staphylococcus aureus infection    Neuropathy    Obesity, unspecified    Osteoarthrosis, unspecified whether generalized or localized, unspecified site    Osteomyelitis of ankle or foot, acute (HCC)    post-surgical,  at [place]    Osteomyelitis of ankle or foot, acute, left (HCC)    OSTEOPENIA    Other and unspecified hyperlipidemia    RLS (restless legs syndrome)    Stroke (HCC)    Type II or unspecified type diabetes mellitus without mention of complication, not stated as uncontrolled    Visual impairment    reading glasses     Past Surgical History:   Procedure Laterality Date    Colonoscopy N/A 1/24/2024    Procedure: COLONOSCOPY with cold snare polypectomy x4 and endoclip x1;  Surgeon: Joey Adams MD;  Location:  ENDOSCOPY    Hemorrhoidectomy      Open rx patella fx      Open rx periartic fx/disloc elbow      Other surgical history  2011, 2012    bilateral foot surgery for Charcot joint    Tonsillectomy  14 y/o     Social History     Socioeconomic History    Marital status: Single   Tobacco Use    Smoking status: Never     Passive exposure: Never    Smokeless tobacco: Never   Vaping Use    Vaping status: Never Used   Substance and Sexual Activity    Alcohol use: No    Drug use: No     Social Drivers of Health     Food Insecurity: No Food Insecurity (3/19/2025)    NCSS - Food Insecurity     Worried About Running Out of Food in the Last Year: No     Ran Out of Food in the Last Year: No   Transportation Needs: No Transportation Needs (3/19/2025)    NCSS - Transportation     Lack of Transportation: No   Housing Stability: Not At Risk (3/19/2025)    NCSS - Housing/Utilities     Has Housing: Yes     Worried About Losing Housing: No     Unable to Get Utilities: No     Family History   Problem Relation Age of Onset    Heart Disease Father     Diabetes Father     Heart Disorder Mother         CHF     Pulmonary Disease Mother         COPD    Heart Surgery Mother     Breast Cancer Sister 44        stage 0, BrCA gene negative     Reviewed, see above    Medications:    influenza virus vaccine PF    insulin degludec    acetaminophen    aspirin    gabapentin    rosuvastatin    insulin degludec    cefepime    vancomycin    metroNIDAZOLE    heparin    losartan    meclizine    amLODIPine    docusate sodium    ferrous sulfate    insulin aspart     Allergies:  Allergies[1]    Physical Exam:  Vitals:    03/19/25 0344   BP: 110/44   Pulse: 61   Resp: 14   Temp: 99.3 °F (37.4 °C)     Vitals signs and nursing note reviewed.   Constitutional:       Appearance: Normal appearance.   HENT:      Head: Normocephalic and atraumatic.      Mouth: Mucous membranes are moist.   Neck:      Musculoskeletal: Neck supple.   Cardiovascular:      Rate and Rhythm: Normal rate.      Heart sounds: Normal heart sounds. No murmur. No friction rub. No gallop.    Pulmonary:      Effort: Pulmonary effort is normal. No respiratory distress.      Breath sounds: Normal breath sounds. No stridor. No wheezing, rhonchi or rales.   Chest:      Chest wall: No tenderness.   Abdominal:      General: Abdomen is flat. There is no distension.      Palpations: Abdomen is soft. There is no mass.      Tenderness: There is no tenderness. There is no guarding or rebound.      Hernia: No hernia is present.   Musculoskeletal:      Right lower leg: No edema.      Left lower leg: Right-sided diabetic foot infection with foul-smelling drainage, swelling and cellulitis  Skin:     General: Skin is warm and dry.   Neurological:      General: No focal deficit present.      Mental Status: Alert and oriented to person, place, and time.     Laboratory data:  I have independently reviewed all lab results; including old microbiological results.  Lab Results   Component Value Date    WBC 10.1 03/19/2025    HGB 10.1 03/19/2025    HCT 30.9 03/19/2025    .0 03/19/2025     CREATSERUM 0.94 03/19/2025    BUN 20 03/19/2025     03/19/2025    K 3.6 03/19/2025     03/19/2025    CO2 24.0 03/19/2025     03/19/2025    CA 8.8 03/19/2025    ALB 4.4 03/18/2025    ALKPHO 82 03/18/2025    BILT 0.9 03/18/2025    TP 7.8 03/18/2025    AST 43 03/18/2025    ALT 53 03/18/2025    PTT 37.4 03/18/2025    INR 1.26 03/18/2025        Recent Labs   Lab 03/18/25  1941 03/19/25  0446   RBC 4.26 3.63*   HGB 11.9* 10.1*   HCT 35.8* 30.9*   MCV 84.0 85.1   MCH 27.9 27.8   MCHC 33.2 32.7   RDW 14.8 14.6   NEPRELIM 9.67*  --    WBC 12.7* 10.1   .0 216.0       Microbiology data:  No results found for this visit on 03/18/25.      Radiology:  I have reviewed all imaging data available independently.   X-ray of the right foot:  Osteoarthritis.  No osteomyelitis    Impression:  Delmar Hernández is a 69 year old male with     Right-sided diabetic foot infection with threat to limb, presenting here with fevers and leukocytosis concerning for evolving sepsis with threat to life  Wound cultures are pending  In the past, the patient had grown Acinetobacter and MSSA  Currently on IV cefazolin  Would benefit from an MRI  Type 2 diabetes  Will need optimal control    Recommendations:     Follow-up on wound cultures  Discontinue cefazolin and start IV cefepime, metronidazole along with IV vancomycin  The patient would like to be transferred over to [place] as his care is over there.  Recommend obtaining an MRI of the right foot and podiatry consultation if the patient wishes to stay here  Continue to monitor daily labs for antibiotic toxicity  Further recommendations will depend on the above work-up and clinical progress     The plan of care was discussed with the primary hospital team, Katelynn Perry*     Recommendations were also discussed with the patient; all questions were answered.     Thank you for this consultation. Please don't hesitate to call the ID team for questions or any acute changes  in patient's clinical condition.    Please note that this report has been produced using speech recognition software and may contain errors related to that system including, but not limited to, errors in grammar, punctuation, and spelling, as well as words and phrases that possibly may have been recognized inappropriately.  If there are any questions or concerns, contact the dictating provider for clarification.    The  [employer] C[employer] Act makes medical notes like these available to patients in the interest of transparency. Please be advised this is a medical document. Medical documents are intended to carry relevant information, facts as evident, and the clinical opinion of the practitioner. The medical note is intended as peer to peer communication and may appear blunt or direct. It is written in medical language and may contain abbreviations or verbiage that are unfamiliar.     Gurmeet Cabrales MD  [facility]. Tel: [phone]. Fax: [phone].     Delmar Hernández : 1955 MRN: [id number] CSN: [id number]          [1]   Allergies  Allergen Reactions    Coffee Bean Extract [Coffea Arabica] OTHER (SEE COMMENTS)     Vision changes  Gives blurry vision

## 2025-03-19 NOTE — TRANSFER CENTER NOTE
[facility] Note:  Reason for transfer:   Diabetic foot infection    Request initiated by:  Dr. Perry    Active Acute Medical issue:    Sepsis POA  - 2/2 viral URI vs. R foot wound  - cultures pending from ED  - check ESR, CRP  - consult ID  - cont cefazolin  - check expanded viral panel  - may need MRI and podiatry evaluation  - wound care evaluation     CXR with pulmonary vascular congestion and edema  - check proBNP  - update echo  - may need diuresis     DM2  - ISS/accucheks     Primary HTN  - amlodipine, losartan     Hyperlipidemia  - statin     Hx CVA  - asa, statin      Anticipated Transfer Plan:   [facility], plan of care discussed with transfer center RN Darlyn.  Face sheet faxed. bedside RN was updated. Patient/family was notified  by the provider and are agreeable to the plan.  [facility] is facilitating coordination of care and will follow.     1050: currently [facility] is on transfer hold. Patient normally sees Dr. Jose F Johnson     1600: verified with Darlyn at [facility] insurance approved, patient accepted, no beds available.

## 2025-03-19 NOTE — PLAN OF CARE
PT ADMITTED A/O, 94% ON RA, TEMP- 99.6, DRESSING TO RISSA FEET D/I, RATES PAIN TO RT FOOT AT 1, REFUSED SCDS, ADMISSION NAVIGATOR COMPLETED, WANTS TO TAKE TRESIBA IN AM, LABS IN AM, INSTRUCTED PT ON POC, FALL PRECAUTIONS.    Problem: SKIN/TISSUE INTEGRITY - ADULT  Goal: Incision(s), wounds(s) or drain site(s) healing without S/S of infection  Description: INTERVENTIONS:- Assess and document risk factors for pressure ulcer development- Assess and document skin integrity- Assess and document dressing/incision, wound bed, drain sites and surrounding tissue- Implement wound care per orders- Initiate isolation precautions as appropriate- Initiate Pressure Ulcer prevention bundle as indicated  Outcome: Progressing

## 2025-03-19 NOTE — OCCUPATIONAL THERAPY NOTE
OCCUPATIONAL THERAPY EVALUATION - INPATIENT     Room Number: 381/381-A  Evaluation Date: 3/19/2025  Type of Evaluation: Initial  Presenting Problem: Fevers, chills  RLE cellulitis    Physician Order: IP Consult to Occupational Therapy  Reason for Therapy: ADL/IADL Dysfunction and Discharge Planning    OCCUPATIONAL THERAPY ASSESSMENT   Patient is currently functioning near baseline with  ADLs and transfers . Prior to admission, patient's baseline is MOD I.  Patient is requiring SBA to MIN A as a result of the following impairments: decreased endurance, pain, impaired standing balance, decreased muscular endurance, and limited BLE ROM. Occupational Therapy will continue to follow for duration of hospitalization.    Patient will benefit from continued skilled OT Services with no additional skilled services but increased support at home    History Related to Current Admission: Patient is a 69 year old male admitted on 3/18/2025 from home for fever, R le wound and decreased appetite.  Pt diagnosed with R le cellulits.  Co-Morbidities : DMII, HTN, HL, chronic foot deformities    Recommendations for nursing staff:   Transfers: up x1  Toileting location: commode    EVALUATION SESSION:  Patient Start of Session: semi-supine     FUNCTIONAL TRANSFER ASSESSMENT  Sit to Stand: Edge of Bed  Edge of Bed: Contact Guard Assist  Commode Transfer: Contact Guard Assist    BED MOBILITY  Supine to Sit : Independent  Scooting: IND    BALANCE ASSESSMENT  Static Sitting: Independent  Static Standing: Contact Guard Assist    FUNCTIONAL ADL ASSESSMENT  LB Dressing Seated: Stand-by Assist  LB Dressing Standing: Minimal Assist    ACTIVITY TOLERANCE: Pt on room air and denies SOB, dizziness or lightheadedness throughout session. No significant change in vitals noted.     COGNITION  Overall Cognitive Status:  WFL - within functional limits    Upper Extremity   ROM: within functional limits   Strength: within functional limits    Coordination  Gross motor: wfl  Fine motor: wfl    EDUCATION PROVIDED  Patient Education : Role of Occupational Therapy; Plan of Care; Functional Transfer Techniques; Posture/Positioning; Proper Body Mechanics  Patient's Response to Education: Verbalized Understanding; Returned Demonstration    Equipment used: none used this session, may benefit from trial with RW to off load weight onto RLE during mobility d/t pain     Therapist comments: Pt is pleasant and agreeable to therapy. Pt prefers not to walk on R foot without additional support since RLE can't fit into shoe with bulky dressing. Messaged RN who will order post-op shoe. Encouraged Pt to be up in chair for all meals.     Patient End of Session: Up in chair, Needs met, RN aware of session/findings, Call light within reach, All patient questions and concerns addressed    OCCUPATIONAL PROFILE    HOME SITUATION  Type of Home: Condo  Home Layout: One level  Lives With:  (sister)  Toilet and Equipment: Comfort height toilet  Shower/Tub and Equipment:  (sponge bath)  Other Equipment:  (cane - does not typically use)  Occupation/Status:  for [employer]  Drives: Yes  Patient Regularly Uses: Reading glasses    Prior Level of Function: Pt is typically independent with ADLs and mobility without device. Has brace for L Charcot joint, reports still being adjusted.    SUBJECTIVE   \"I'm working on a project for the [employer] [employer] of [address].\"    PAIN ASSESSMENT  Ratin  Location: R foot  Management Techniques: Repositioning    OBJECTIVE  Precautions: Other (Comment) (Boot to be worn on L le)  Fall Risk: Standard fall risk    ASSESSMENTS    AM-PAC ‘6-Clicks’ Inpatient Daily Activity Short Form  -   Putting on and taking off regular lower body clothing?: A Little  -   Bathing (including washing, rinsing, drying)?: A Little  -   Toileting, which includes using toilet, bedpan or urinal? : A Little  -   Putting on and taking off regular upper body  clothing?: None  -   Taking care of personal grooming such as brushing teeth?: None  -   Eating meals?: None    AM-PAC Score:  Score: 21  Approx Degree of Impairment: 32.79%  Standardized Score (AM-PAC Scale): 44.27    PLAN  OT Device Recommendations: None  OT Treatment Plan: ADL training, Functional transfer training, Endurance training, Patient/Family education, Patient/Family training, Compensatory technique education  Rehab Potential : Good  Frequency: 3-5x/week  Number of Visits to Meet Established Goals: 1    ADL Goals   Patient will perform lower body dressing:  with modified independent  Patient will perform toileting: with modified independent    Functional Transfer Goals  Patient will transfer from sit to stand:  with modified independent  Patient will transfer to toilet:  with modified independent    Patient Evaluation Complexity Level:   Occupational Profile/Medical History LOW - Brief history including review of medical or therapy records    Specific performance deficits impacting engagement in ADL/IADL MODERATE  3 - 5 performance deficits   Client Assessment/Performance Deficits MODERATE - Comorbidities and min to mod modifications of tasks    Clinical Decision Making LOW - Analysis of occupational profile, problem-focused assessments, limited treatment options    Overall Complexity LOW     OT Session Time: 20 minutes

## 2025-03-20 ENCOUNTER — APPOINTMENT (OUTPATIENT)
Dept: MRI IMAGING | Facility: HOSPITAL | Age: 70
End: 2025-03-20
Attending: INTERNAL MEDICINE
Payer: MEDICAID

## 2025-03-20 LAB
GLUCOSE BLD-MCNC: 101 MG/DL (ref 70–99)
GLUCOSE BLD-MCNC: 112 MG/DL (ref 70–99)
GLUCOSE BLD-MCNC: 140 MG/DL (ref 70–99)
GLUCOSE BLD-MCNC: 204 MG/DL (ref 70–99)
POTASSIUM SERPL-SCNC: 4.2 MMOL/L (ref 3.5–5.1)

## 2025-03-20 PROCEDURE — 73720 MRI LWR EXTREMITY W/O&W/DYE: CPT | Performed by: INTERNAL MEDICINE

## 2025-03-20 PROCEDURE — 84132 ASSAY OF SERUM POTASSIUM: CPT | Performed by: HOSPITALIST

## 2025-03-20 PROCEDURE — 82962 GLUCOSE BLOOD TEST: CPT

## 2025-03-20 PROCEDURE — A9575 INJ GADOTERATE MEGLUMI 0.1ML: HCPCS | Performed by: HOSPITALIST

## 2025-03-20 PROCEDURE — 94760 N-INVAS EAR/PLS OXIMETRY 1: CPT

## 2025-03-20 RX ORDER — GADOTERATE MEGLUMINE 376.9 MG/ML
20 INJECTION INTRAVENOUS
Status: COMPLETED | OUTPATIENT
Start: 2025-03-20 | End: 2025-03-20

## 2025-03-20 NOTE — PROGRESS NOTES
Count includes the [facility] and [facility]  Hospitalist Progress Note                                                                     [facility]   part of [facility]        Delmar Hernández  4/9/1955    SUBJECTIVE:  Patient seen and examined.  Feeling well.  Returned from MRI.   Denies CP/SOB.  NAD.       OBJECTIVE:  Temp:  [97.8 °F (36.6 °C)-99.4 °F (37.4 °C)] 97.8 °F (36.6 °C)  Pulse:  [50-57] 53  Resp:  [16-18] 17  BP: (110-129)/(40-53) 129/53  SpO2:  [90 %-100 %] 98 %  Exam  Gen: No acute distress, alert and oriented x3, no focal neurologic deficits  Pulm: Lungs clear bilaterally, normal respiratory effort  CV: Heart with regular rate and rhythm, no murmur.  Normal PMI.    Abd: Abdomen soft, nontender, nondistended, no organomegaly, bowel sounds present  MSK: Full range of motion in extremities, no clubbing, no cyanosis. R foot wrapped  Skin: no rashes or lesions    Labs:   Recent Labs   Lab 03/18/25 1941 03/18/25 1942 03/19/25 0446   WBC 12.7*  --  10.1   HGB 11.9*  --  10.1*   MCV 84.0  --  85.1   .0  --  216.0   INR  --  1.26*  --        Recent Labs   Lab 03/18/25 1941 03/19/25 0446 03/20/25 0446    138  --    K 4.5 3.6 4.2   CL 99 102  --    CO2 29.0 24.0  --    BUN 18 20  --    CREATSERUM 1.11 0.94  --    CA 9.2 8.8  --    GLU 77 161*  --        Recent Labs   Lab 03/18/25 1941   ALT 53*   AST 43*   ALB 4.4       Recent Labs   Lab 03/19/25  1213 03/19/25  1738 03/19/25  2200 03/20/25  0505 03/20/25  1345   PGLU 146* 138* 152* 112* 101*       Meds:   Scheduled:    insulin degludec  20 Units Subcutaneous Daily    aspirin  325 mg Oral Daily    gabapentin  800 mg Oral TID    rosuvastatin  20 mg Oral Nightly    insulin degludec  20 Units Subcutaneous Nightly    cefepime  1 g Intravenous Q8H    vancomycin  15 mg/kg Intravenous Q12H    metroNIDAZOLE  500 mg Oral BID    heparin  5,000 Units Subcutaneous 2 times per day    losartan  50 mg Oral Daily     amLODIPine  5 mg Oral QPM    docusate sodium  100 mg Oral QAM    ferrous sulfate  325 mg Oral QOD    insulin aspart  2-10 Units Subcutaneous TID AC and HS     Continuous Infusions:   PRN:   influenza virus vaccine PF    acetaminophen    meclizine    Assessment/Plan:  Principal Problem:    Cellulitis of right lower extremity  Active Problems:    Fever, unspecified fever cause    69 year old male with PMH sig for DM2, charcot foot deformity, HTN, HL, RLS, hx chronic osteomyelitis/superficial L heel wounds, hx CVA p/w fevers, cough and R foot wound.      Sepsis POA R foot wound  - cultures pending from ED -  polymicrobial, strep/enterococcus   - check ESR, CRP - elevated  - consult ID - cefepime, vanc, flagyl  - check expanded viral panel - negative  - MRI results pending  - he is refusing any surgical debridement/intervention at [facility]  - wound care o/c   - was able to discuss with his orthopedic MD from [place] who wrote the chapter on DFU management; he advised to discharge home with antibiotics when stable and they will arrange for follow up in clinic next week (appointment has already been set)  - d/w ID, await final culture results before deescalating abx prior to dc     Mild Acute HFpEF exacerbation   CXR with pulmonary vascular congestion and edema  - check proBNP - 619  - Echo from 12/'24 with G1DD  - s/p a dose of IV lasix 40mg  - daily weights, I/Os     DM2  - ISS/accucheks     Primary HTN  - amlodipine, losartan     Hyperlipidemia  - statin     Hx CVA  - asa, statin        FEN: regular diet, PT/OT  Proph: SCDs, heparin  Code status: Full code    Dispo - plan for dc tomorrow if stable      Glenn Perry MD  [facility]  Hospitalist  Message over [employer]  Pager: [phone]    Supplementary Documentation:   DVT Mechanical Prophylaxis:   SCDs,    DVT Pharmacologic Prophylaxis   Medication    heparin (Porcine) 5000 UNIT/ML injection 5,000 Units    DVT Pharmacologic prophylaxis: Aspirin  325 mg           Code Status: Full Code  Gann: No urinary catheter in place  Gann Duration (in days):   Central line:    ISIS: 3/20/2025

## 2025-03-20 NOTE — PLAN OF CARE
AxO4. VSS on RA. Denies nausea during shift, tolerating PO intake. Voiding w/o issue. K+ replaced per protocol. C/o mild pain - given PRN tylenol. Dressing to R foot intact - MRI pending. On IV cefepime, IV vanco. Boot/brace to L foot. Up ad kenny. Poss transfer to [place]. Updated on POC. Safety measures placed. Care ongoing.

## 2025-03-20 NOTE — CONSULTS
[facility]  Report of Inpatient Wound Care Consultation    Delmar Hernández Patient Status:  Inpatient    1955 MRN [id number]   Location [facility] 3SW-A Attending Katelynn Perry*   Hosp Day # 2 PCP Randall Almendarez MD     Reason for Consultation:  R foot and L heel    History of Present Illness:  Delmar Hernández is a a(n) 69 year old male.  Patient with multiple comorbidities.    SUBJECTIVE:  I am followed by Dr. Wylie at [facility] as an OP, he manages my R foot.  I use a CROW boot.        History:  Past Medical History:    Anxiety state    Cellulitis    Cellulitis and abscess of foot, except toes    Charcot foot due to diabetes mellitus (HCC)    Charcot's joint disease due to secondary diabetes    Chronic osteomyelitis of left tibia with draining sinus ([facility])    Essential hypertension    Exposed orthopaedic hardware    Lt foot after Charcot joint surgery revision.  Hardware removed     Heart murmur    High blood pressure    High cholesterol    Hypertriglyceridemia    Hypokalemia    Hypokalemia    Methicillin susceptible Staphylococcus aureus infection    Neuropathy    Obesity, unspecified    Osteoarthrosis, unspecified whether generalized or localized, unspecified site    Osteomyelitis of ankle or foot, acute ([facility])    post-surgical,  at [facility]    Osteomyelitis of ankle or foot, acute, left ([facility])    OSTEOPENIA    Other and unspecified hyperlipidemia    RLS (restless legs syndrome)    Stroke ([facility])    Type II or unspecified type diabetes mellitus without mention of complication, not stated as uncontrolled    Visual impairment    reading glasses     Past Surgical History:   Procedure Laterality Date    Colonoscopy N/A 2024    Procedure: COLONOSCOPY with cold snare polypectomy x4 and endoclip x1;  Surgeon: Joey Adams MD;  Location:  ENDOSCOPY    Hemorrhoidectomy      Open rx patella fx      Open rx periartic fx/disloc elbow      Other surgical history  ,     bilateral foot  surgery for Charcot joint    Tonsillectomy  14 y/o      reports that he has never smoked. He has never been exposed to tobacco smoke. He has never used smokeless tobacco. He reports that he does not drink alcohol and does not use drugs.      Allergies:  @ALLERGY    Laboratory Data:    Recent Labs   Lab 03/18/25 1941 03/18/25 1942 03/18/25  2105 03/19/25  0446 03/19/25  0556 03/19/25  2200 03/20/25  0505 03/20/25  1345   WBC 12.7*  --   --  10.1  --   --   --   --    HGB 11.9*  --   --  10.1*  --   --   --   --    HCT 35.8*  --   --  30.9*  --   --   --   --    .0  --   --  216.0  --   --   --   --    CREATSERUM 1.11  --   --  0.94  --   --   --   --    BUN 18  --   --  20  --   --   --   --    GLU 77  --   --  161*  --   --   --   --    CA 9.2  --   --  8.8  --   --   --   --    ALB 4.4  --   --   --   --   --   --   --    TP 7.8  --   --   --   --   --   --   --    PTT  --  37.4*  --   --   --   --   --   --    INR  --  1.26*  --   --   --   --   --   --    ESRML  --   --   --  75*  --   --   --   --    CRP  --   --   --  22.60*  --   --   --   --    PGLU  --   --    < >  --    < > 152* 112* 101*    < > = values in this interval not displayed.         ASSESSMENT:  Wound 12/09/24 Heel Plantar;Left (Active)   Date First Assessed: 12/09/24   Present on Original Admission: Yes  Primary Wound Type: Diabetic Ulcer  Location: Heel  Wound Location Orientation: Plantar;Left      Assessments 3/20/2025  1:12 PM   Wound Image     Drainage Amount Scant   Drainage Description Serosanguineous   Dressing Changed Changed   Wound Length (cm) 1 cm   Wound Width (cm) 1.5 cm   Wound Surface Area (cm^2) 1.5 cm^2   Wound Depth (cm) 1.4 cm   Wound Volume (cm^3) 2.1 cm^3   Wound Healing % -37   Margins Well-defined edges   Peggy-wound Assessment Clean;Callous   Wound Bed Granulation (%) 100 %   Wound Bed Epithelium (%) 0 %   Wound Bed Slough (%) 0 %   Wound Bed Eschar (%) 0 %   Wound Bed Fibrin (%) 0 %   Wound Odor None    Tunneling? No   Undermining? No   James Scale Grade 2       Wound 03/20/25 Foot Right;Plantar;Lateral (Active)   Date First Assessed: 03/20/25   Present on Original Admission: Yes  Primary Wound Type: Diabetic Ulcer  Location: Foot  Wound Location Orientation: Right;Plantar;Lateral      Assessments 3/20/2025  8:54 AM   Wound Image      Drainage Amount Moderate   Dressing Changed Changed   Wound Length (cm) 2.3 cm   Wound Width (cm) 3 cm   Wound Surface Area (cm^2) 6.9 cm^2   Wound Depth (cm) 1.1 cm   Wound Volume (cm^3) 7.59 cm^3   Margins Epibole (Rolled edges)   Peggy-wound Assessment Edema   Wound Bed Slough (%) 100 %   Wound Bed Eschar (%) 0 %   Wound Bed Fibrin (%) 100 %   Wound Odor Mild   Tunneling? No   Undermining? No   Sinus Tracts? No                R + L foot warm, +dorsal pedal pulse, capillary refill at 4 second.      L foot appears to be healing well.     Discussed recommendation for the following regarding the R foot:  Podiatry consult  Imaging to r/o osteo and infection  Patient would like to follow up at [facility] and follow up with Dr. Wylie.  Explained that this is fine but we need to make sure that the foot is stable enough for discharge and follow up at [facility] and follow up with Dr. Wylie.       Wound Cleaning and Dressings:  Showering directions: May shower and/or cleanse wound with mild soap and water  Wound cleansing:  Cleanse with normal saline or wound cleanser  Wound cleaning frequency: Daily  Wound product: Silver alginate, Silver foam, ABD pad, and Kerlix  Dressing change frequency:  Change dressing daily and/or PRN  Enzymatic agent:  Not applicable    Compression Therapy:   Tubigrip      Care Summary:  Care Summary: Discussed Plan of Care at beside with patient. Patient verbally acknowledges understanding of all instructions and all questions were answered.        Thank you for this consultation and for allowing me to participate in  the care of your patient.      Time Spent 45 Minutes.    Thank you,  Roxy Reyes, PT, MPT  Wound Care Clinician  [facility]  3/20/2025

## 2025-03-20 NOTE — PROGRESS NOTES
[facility]   part of [facility] Infectious Disease  Progress Note    Delmar Hernández Patient Status:  Inpatient    1955 MRN [id number]   Location [facility] 3SW-A Attending Katelynn Perry*   Hosp Day # 2 PCP Randall Almendarez MD     Subjective:  Patient seen and examined in bed. No acute overnight events. Feeling better today. Tolerating IV antibiotics. Pain controlled. Denies F/C. Denies n/v/d. Otherwise no new complaints.     Objective:  Blood pressure 120/45, pulse 57, temperature 99 °F (37.2 °C), temperature source Oral, resp. rate 18, weight 261 lb (118.4 kg), SpO2 96%.    Intake/Output:    Intake/Output Summary (Last 24 hours) at 3/20/2025 0712  Last data filed at 3/20/2025 0230  Gross per 24 hour   Intake 1080 ml   Output 2750 ml   Net -1670 ml       Physical Exam:  General: Awake, alert, non-tox, NAD.  HEENT:  Oropharynx clear, trachea ML.  Heart: RRR S1S2 no murmurs.  Lungs: Essentially CTA b/l, no rhonchi, rales, wheezes.  Abdomen: Soft, NT/ND.  BS present.  No guarding or rebound.  Extremity: R foot wrapped with dressing c/d/i.  Neurological: No focal deficits.  Derm:  Warm and dry.        Lab Data Review:  Lab Results   Component Value Date    K 4.2 2025        Cultures:  Hospital Encounter on 25   1. Aerobic Bacterial Culture     Status: None (Preliminary result)    Collection Time: 25  9:07 PM    Specimen: Foot; Other   Result Value Ref Range    Aerobic Smear No WBCs seen N/A    Aerobic Smear 2+ Gram Positive Cocci N/A   2. Blood Culture     Status: None (Preliminary result)    Collection Time: 25  7:51 PM    Specimen: Blood,peripheral   Result Value Ref Range    Blood Culture Result No Growth 1 Day N/A       Radiology:  XR FOOT, COMPLETE (MIN 3 VIEWS), RIGHT (CPT=73630)    Result Date: 3/18/2025  CONCLUSION:  There is severe osteoarthritis with flattening of the foot involving the tarsal articulations of the mid and hindfoot.    LOCATION:  [place]   Dictated by (CST): Ivan Paulino MD on 3/18/2025 at 8:46 PM     Finalized by (CST): Ivan Paulino MD on 3/18/2025 at 8:48 PM       XR CHEST PA + LAT CHEST (CPT=71046)    Result Date: 3/18/2025  CONCLUSION:  Cardiomegaly with mild pulmonary vascular congestion and increased interstitial markings lungs is concerning for mild congestive failure with mild pulmonary edema.  Clinical correlation recommended. Hyperinflation of the lungs suggesting the  possibility of underlying COPD and/or asthma. Clinical correlation recommended.    LOCATION:  [facility]   Dictated by (CST): Romario Hammonds MD on 3/18/2025 at 8:47 PM     Finalized by (CST): Romario Hammonds MD on 3/18/2025 at 8:48 PM          Assessment and Plan:  1. R DFI with threat to limb in this patient presenting with fevers and leukocytosis concerning for evolving sepsis with threat to life  - Wound cultures with GPC's on smear.  - Cultures historically with Acinetobacter and MSSA.  - Blood cultures negative thus far.  - MRI R foot pending.  - IV cefepime + Flagyl + vancomycin, ongoing.     2. DM II  - Optimal glycemic control.    3. Recs  - Continue IV cefepime + Flagyl + vancomycin.  - F/u WBC and fever curve.  - F/u cultures.  - F/u MRI.  - Patient would like to be transferred to [place] as his care is over there.   - Supportive care as per the primary team.   - Discussed plan of care with nursing.  - Discussed plan of care with patient. All questions addressed and understanding verbalized.  - Further recommendations pending clinical course.    Discussed case with ID attending/collaborating physician, Dr. Gurmeet Cabrales, who is in agreement with the above plan of care    Please note that this report has been produced using speech recognition software and may contain errors related to that system including, but not limited to, errors in grammar, punctuation, and spelling, as well as words and phrases that possibly may have been recognized  inappropriately.  If there are any questions or concerns, contact the dictating provider for clarification.     The 21st Century Cures Act makes medical notes like these available to patients in the interest of transparency. Please be advised this is a medical document. Medical documents are intended to carry relevant information, facts as evident, and the clinical opinion of the practitioner. The medical note is intended as peer to peer communication and may appear blunt or direct. It is written in medical language and may contain abbreviations or verbiage that are unfamiliar.     If you have any questions or concerns please call [facility] Infectious Disease at [phone].     Tramaine Serrano, APRN    3/20/2025  7:12 AM

## 2025-03-20 NOTE — PLAN OF CARE
Patient alert and oriented x4. VSS, on RA. Mild pain reported at times, no need for pain medication. Chronic neuropathy reported to BLE. CROW boot for LLE. Dressing to RLE present, evaluated by wound care RN. Tolerating PO intake, denies N/V. Up x1 person SBA.       Plan: IV abx per orders, await final cx results. Awaiting bed placement at [place]

## 2025-03-20 NOTE — TRANSFER CENTER NOTE
Call from Dr. Perry. He spoke with DrOttoniel From [place] and ok to dc home on abx and follow as out patient. Transfer request cancelled.

## 2025-03-21 VITALS
RESPIRATION RATE: 16 BRPM | HEART RATE: 61 BPM | WEIGHT: 261 LBS | SYSTOLIC BLOOD PRESSURE: 128 MMHG | DIASTOLIC BLOOD PRESSURE: 43 MMHG | TEMPERATURE: 100 F | BODY MASS INDEX: 34 KG/M2 | OXYGEN SATURATION: 96 %

## 2025-03-21 LAB
GLUCOSE BLD-MCNC: 111 MG/DL (ref 70–99)
GLUCOSE BLD-MCNC: 128 MG/DL (ref 70–99)
GLUCOSE BLD-MCNC: 144 MG/DL (ref 70–99)
VANCOMYCIN PEAK SERPL-MCNC: 27.3 UG/ML (ref 30–50)

## 2025-03-21 PROCEDURE — 97535 SELF CARE MNGMENT TRAINING: CPT

## 2025-03-21 PROCEDURE — 80202 ASSAY OF VANCOMYCIN: CPT | Performed by: INTERNAL MEDICINE

## 2025-03-21 PROCEDURE — 97530 THERAPEUTIC ACTIVITIES: CPT

## 2025-03-21 PROCEDURE — 82962 GLUCOSE BLOOD TEST: CPT

## 2025-03-21 PROCEDURE — 97116 GAIT TRAINING THERAPY: CPT

## 2025-03-21 NOTE — DISCHARGE INSTRUCTIONS
Start Augmentin 875/125 mg 1 tablet twice a day, discharged on the same for the next 14 days with follow-up with his podiatrist on [place] to decide on final course of antibiotics

## 2025-03-21 NOTE — OCCUPATIONAL THERAPY NOTE
OCCUPATIONAL THERAPY TREATMENT NOTE - INPATIENT     Room Number: 381/381-A  Session: 1   Number of Visits to Meet Established Goals: 1    Presenting Problem: Fevers, chills  RLE cellulitis    HOME SITUATION  Type of Home: Condo  Home Layout: One level  Lives With:  (sister)  Toilet and Equipment: Comfort height toilet  Shower/Tub and Equipment:  (sponge bath)  Other Equipment:  (cane - does not typically use)  Occupation/Status:  for [employer]  Drives: Yes  Patient Regularly Uses: Reading glasses     Prior Level of Function: Pt is typically independent with ADLs and mobility without device. Has brace for L Charcot joint, reports still being adjusted.    ASSESSMENT   Patient demonstrates good  progress this session, goals updated to reflect patient performance.    Patient functions at baseline with  ADLs and functional transfers .   Occupational Therapy will discharge patient at this time as all goals have been met at mod I.    Patient would benefit from home at discharge.    History: Patient is a 69 year old male admitted on 3/18/2025 from home for fever, R le wound and decreased appetite.  Pt diagnosed with R le cellulits.  Co-Morbidities : DMII, HTN, HL, chronic foot deformities    WEIGHT BEARING RESTRICTION       Recommendations for nursing staff:   Transfers: 1 person  Toileting location: toilet    TREATMENT SESSION:  Patient Start of Session: seated EOB    FUNCTIONAL TRANSFER ASSESSMENT  Sit to Stand: Edge of Bed; Chair  Edge of Bed: Supervision  Chair: Supervision  Toilet Transfer: Supervision  Commode Transfer: Not Tested    BED MOBILITY  Supine to Sit : Independent  Scooting: Independent    BALANCE ASSESSMENT  Static Sitting: Independent  Static Standing: Independent    FUNCTIONAL ADL ASSESSMENT  Grooming Seated: Independent  Grooming Standing: Supervision  LB Dressing Seated: Stand-by Assist  LB Dressing Standing: Minimal Assist  Toileting Seated: Modified Independent  Toileting  Standing: Supervision    ACTIVITY TOLERANCE: WFL                         O2 SATURATIONS       EDUCATION PROVIDED  Patient Education : Role of Occupational Therapy; Plan of Care; Discharge Recommendations; Functional Transfer Techniques; Fall Prevention; Posture/Positioning; Energy Conservation; Proper Body Mechanics  Patient's Response to Education: Verbalized Understanding    Equipment used: RW  Demonstrates functional use    Therapist comments: Pt received seated EOB, pleasant and cooperative for OT session. Pt verbalizing concerns for R shoe due to ulcer/wound site. Per Vidhya MENDES, pt has a surgical shoe in his room, however per pt, it is too small. This writer fitted pt to shoe with pt reporting mild discomfort. Pt still agreeable for OOB activity in preparation for ADLs. Pt ambulates to toilet with RW at mod I. Pt completes all aspects of toileting in sitting/standing requiring mod I/supervision. Pt then completes hand washing at sink at supervision and transfers to bedside chair for oral care. Pt demos good functional reaching for LB dressing in sitting/standing.     Patient End of Session: Up in chair, Needs met, Call light within reach, RN aware of session/findings, All patient questions and concerns addressed, Hospital anti-slip socks    SUBJECTIVE  Pt pleasant and cooperative.    PAIN ASSESSMENT  Ratin  Location: R foot  Management Techniques: Repositioning     OBJECTIVE  Precautions: Other (Comment) (Boot to be worn on L le)    AM-PAC ‘6-Clicks’ Inpatient Daily Activity Short Form  -   Putting on and taking off regular lower body clothing?: A Little  -   Bathing (including washing, rinsing, drying)?: A Little  -   Toileting, which includes using toilet, bedpan or urinal? : A Little  -   Putting on and taking off regular upper body clothing?: None  -   Taking care of personal grooming such as brushing teeth?: None  -   Eating meals?: None    AM-PAC Score:  Score: 21  Approx Degree of Impairment:  32.79%  Standardized Score (AM-PAC Scale): 44.27    PLAN  OT Device Recommendations: None  OT Treatment Plan: ADL training, Functional transfer training, Endurance training, Patient/Family education, Patient/Family training, Compensatory technique education  Rehab Potential : Good  Frequency: 3-5x/week    OT Goals:     All goals ongoing 03/21    ADL Goals   Patient will perform lower body dressing:  with modified independent- goal met 03/21  Patient will perform toileting: with modified independent- goal met 03/21     Functional Transfer Goals  Patient will transfer from sit to stand:  with modified independent- goal met 03/21  Patient will transfer to toilet:  with modified independent- goal met 03/21    OT Session Time: 25 minutes  Self-Care Home Management: 25 minutes

## 2025-03-21 NOTE — PROGRESS NOTES
Infectious Disease Progress Note      Date of admission: 3/18/2025  7:07 PM     Reason for consult: Right-sided infected foot ulcer    Referring physician: Katelynn Perry*    Subjective: The patient is feeling better.  No nausea vomiting.  No diarrhea.  No shortness of breath.  No cough or sputum production.  Foot is slowly improving.    Medications:    amoxicillin potassium and clavulanate    influenza virus vaccine PF    insulin degludec    aspirin    gabapentin    rosuvastatin    insulin degludec    acetaminophen    heparin    losartan    meclizine    amLODIPine    docusate sodium    ferrous sulfate    insulin aspart     Allergies:  Allergies[1]    Physical Exam:  Vitals:    03/21/25 0800   BP: 130/50   Pulse: 58   Resp: 16   Temp: 99 °F (37.2 °C)     Vitals signs and nursing note reviewed.   Constitutional:       Appearance: Normal appearance.   HENT:      Head: Normocephalic and atraumatic.      Mouth: Mucous membranes are moist.   Neck:      Musculoskeletal: Neck supple.   Cardiovascular:      Rate and Rhythm: Normal rate.   Pulmonary:      Effort: Pulmonary effort is normal. No respiratory distress.   Skin:     General: Skin is warm and dry.   Neurological:      General: No focal deficit present.      Mental Status: Alert and oriented to person, place, and time.       Laboratory data:  I have reviewed all the lab results independently.      Recent Labs   Lab 03/18/25  1941 03/19/25  0446   RBC 4.26 3.63*   HGB 11.9* 10.1*   HCT 35.8* 30.9*   MCV 84.0 85.1   MCH 27.9 27.8   MCHC 33.2 32.7   RDW 14.8 14.6   NEPRELIM 9.67*  --    WBC 12.7* 10.1   .0 216.0      Microbiology data:  Hospital Encounter on 03/18/25   1. Aerobic Bacterial Culture     Status: Abnormal    Collection Time: 03/18/25  9:07 PM    Specimen: Foot; Other   Result Value Ref Range    Aerobic Culture Result 4+ growth Streptococcus Group C (A) N/A    Aerobic Culture Result 4+ growth Enterococcus faecalis (A) N/A    Aerobic Smear  No WBCs seen N/A    Aerobic Smear 2+ Gram Positive Cocci N/A       Susceptibility    Enterococcus faecalis -  (no method available)     Ampicillin <=2 Sensitive      Vancomycin 1 Sensitive    2. Blood Culture     Status: None (Preliminary result)    Collection Time: 03/18/25  7:51 PM    Specimen: Blood,peripheral   Result Value Ref Range    Blood Culture Result No Growth 2 Days N/A        Radiology:  I have reviewed all imagining data available independently.   MRI of the right foot:  1. Findings compatible with Charcot joint with severe osteomyelitis of the talus as described above.   2. Large ulcer with possible early abscess formation along the medial inferior aspect of the foot abutting the talus.   3. Diffuse edema throughout the musculature in the ankle and foot may represent severe myositis versus cellulitis.     Impression:  Delmar Hernández is a 69 year old male with    Right-sided diabetic foot infection with osteomyelitis with threat to limb, presenting here with fevers and leukocytosis concerning for evolving sepsis with threat to life  Wound cultures are growing Enterococcus faecalis and group C strep  MRI of the foot noted  Currently on IV cefepime, vancomycin and Flagyl  Type 2 diabetes  Will need optimal control    Recommendations:    Discontinue cefepime, vancomycin and Flagyl  Start Augmentin 875/125 mg 1 tablet twice a day, discharged on the same for the next 14 days with follow-up with his podiatrist on [place] to decide on final course of antibiotics  ID will sign off, please call us with any questions or changes of status.  Thank you for this consultation.    The plan of care was discussed with the primary hospital team, Katelynn Perry*     Recommendations were also discussed with the patient; all questions were answered.     Thank you for this consultation. Please don't hesitate to call the ID team for questions or any acute changes in patient's clinical condition.    Please note that  this report has been produced using speech recognition software and may contain errors related to that system including, but not limited to, errors in grammar, punctuation, and spelling, as well as words and phrases that possibly may have been recognized inappropriately.  If there are any questions or concerns, contact the dictating provider for clarification.    The  [employer] C[employer] Act makes medical notes like these available to patients in the interest of transparency. Please be advised this is a medical document. Medical documents are intended to carry relevant information, facts as evident, and the clinical opinion of the practitioner. The medical note is intended as peer to peer communication and may appear blunt or direct. It is written in medical language and may contain abbreviations or verbiage that are unfamiliar.     Gurmeet Cabrales MD  [facility]. Tel: [phone]. Fax: [phone].     Delmar Hernández : 1955 MRN: [id number] CSN: [id number]          [1]   Allergies  Allergen Reactions    Coffee Bean Extract [Coffea Arabica] OTHER (SEE COMMENTS)     Vision changes  Gives blurry vision

## 2025-03-21 NOTE — PLAN OF CARE
A&O x 4. VSS. On RA. No c/o pain. 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 Dressings to bilateral feet C/D/I. Up SB with walker. Voiding without issue per urinal. DC home this afternoon. Will continue to monitor.

## 2025-03-21 NOTE — PLAN OF CARE
AxO4. VSS on RA. Denies nausea during shift, tolerating PO intake. Voiding w/o issue. C/o mild pain and discomfort to R foot d/t inc compression - given PRN tylenol and temporarily removed comp sleeve. Dressing to R foot intact - MRI done today and CD @ bedside. On IV cefepime, IV vanco, PO flagyl. Boot/brace to L foot. Up ad kenny. Poss dc in AM. Updated on POC. Safety measures placed. Care ongoing.

## 2025-03-21 NOTE — PHYSICAL THERAPY NOTE
PHYSICAL THERAPY TREATMENT NOTE - INPATIENT    Room Number: 381/381-A     Session: 1     Number of Visits to Meet Established Goals: 5    Presenting Problem: R LE cellulitis  Co-Morbidities : DMII, HTN, HL, chronic foot deformities    PHYSICAL THERAPY ASSESSMENT   Patient demonstrates good  progress this session, goals   met at supervision level.      Patient is requiring supervision as a result of the following impairments: pain and decreased muscular endurance.     Patient continues to function near baseline with bed mobility, transfers, and gait.   Patient continues to benefit from continued skilled PT services: with no additional skilled services but increased support at home.    PLAN DURING HOSPITALIZATION  Nursing Mobility Recommendation : 1 Assist     PT Treatment Plan: Bed mobility, Patient education, Family education, Gait training, Strengthening, Transfer training, Balance training  Frequency (Obs): 3-5x/week     CURRENT GOALS     Goal #1 Patient is able to demonstrate supine - sit EOB @ level: modified independent      Goal #2 Patient is able to demonstrate transfers EOB to/from BSC at assistance level: modified independent      Goal #3 Patient is able to ambulate 150 feet with assist device:  LRAD  at assistance level: modified independent      Goal #4     Goal #5     Goal #6     Goal Comments: Goals established on 3/19/2025   goals met at supervision level     SUBJECTIVE  \"I'm going to ask Nayana if they can get me a new boot.\"     OBJECTIVE  Precautions: Other (Comment) (Boot to be worn on L le)    WEIGHT BEARING RESTRICTION     PAIN ASSESSMENT   Ratin  Location: R LE  Management Techniques: Activity promotion, Repositioning    BALANCE                                                                                                                       Static Sitting: Good  Dynamic Sitting: Good           Static Standing: Fair  Dynamic Standing: Fair -    ACTIVITY TOLERANCE                          O2 WALK       AM-PAC '6-Clicks' INPATIENT SHORT FORM - BASIC MOBILITY  How much difficulty does the patient currently have...  Patient Difficulty: Turning over in bed (including adjusting bedclothes, sheets and blankets)?: None   Patient Difficulty: Sitting down on and standing up from a chair with arms (e.g., wheelchair, bedside commode, etc.): None   Patient Difficulty: Moving from lying on back to sitting on the side of the bed?: None   How much help from another person does the patient currently need...   Help from Another: Moving to and from a bed to a chair (including a wheelchair)?: A Little   Help from Another: Need to walk in hospital room?: A Little   Help from Another: Climbing 3-5 steps with a railing?: A Little     AM-PAC Score:  Raw Score: 21   Approx Degree of Impairment: 28.97%   Standardized Score (AM-PAC Scale): 50.25   CMS Modifier (G-Code): CJ    FUNCTIONAL ABILITY STATUS  Gait Assessment   Functional Mobility/Gait Assessment  Gait Assistance: Supervision  Distance (ft): 200  Assistive Device: Rolling walker, Cane  Pattern: Shuffle    Skilled Therapy Provided  Pt assisted with donning R surgical shoe. L LE brace donned.   Sit-stand to RW with supervision.   Pt ambulated 100ft with RW and supervision.   Pt ambulates with step through gait pattern and good rubia.   Pt requests to trial ambulation with cane.   Pt ambulated 100ft with cane and supervision.   Slower rubia and more antalgia noted with cane. Pt demos good safety and pacing.   VC for sequencing with cane.   Returned to room sitting up in bedside chair.     Bed Mobility:  Rolling: NT   Supine<>Sit: NT   Sit<>Supine: NT     Transfer Mobility:  Sit<>Stand: supervision   Stand<>Sit: supervision   Gait: supervision    Therapist's Comments:  Reviewed activity recommendations. Pt prefers to wear surgical shoe on R LE.   Patient End of Session: Up in chair, Needs met, Call light within reach, RN aware of session/findings, All patient  questions and concerns addressed, Hospital anti-slip socks    PT Session Time: 25 minutes  Gait Training: 10 minutes  Therapeutic Activity: 8 minutes  Therapeutic Exercise:  minutes   Neuromuscular Re-education:  minutes

## 2025-03-21 NOTE — DISCHARGE SUMMARY
[facility] Hospitalist Discharge Summary     Patient ID:  Delmar Hernández  69 year old  4/9/1955    Admit date: 3/18/2025    Discharge date and time: 03/21/25     Attending Physician: Katelynn Perry*     Primary Care Physician: Randall Almendarez MD     Discharge Diagnoses: Cellulitis of right lower extremity [L03.115]  Fever, unspecified fever cause [R50.9]    Please note that only [facility] DMG and EMG patients enrolled in the Medicare ACO, [facility] ACO and [facility] HMOs will be handled by the [facility] [place] Care Management team.  For all other patients, please follow usual protocol for discharge care transition.    Discharge Condition: stable    Disposition:  home    Important Follow up:  - PCP within 2 weeks              Hospital Course:        69 year old male with PMH sig for DM2, charcot foot deformity, HTN, HL, RLS, hx chronic osteomyelitis/superficial L heel wounds, hx CVA p/w fevers, cough and R foot wound. Symptoms started on Thursday and he was [facility] Friday who told him he may have viral URI and advised supportive cares. He also reports decreased UO. Denies diarrhea/vomiting. Also reports R foot wound that has been there for several weeks. He reports he recently had echo done through his cardiologist Dr. Chapincito Smith, jim   In the ED Tmax 100 F. Labs with minimal transaminitis, leukocytosis. CXR with mild PVC/pulm edema suggestive of mild congestive heart failure. Started on cefazolin and admitted for further evaluation and treatment.     Sepsis POA R foot wound  - cultures pending from ED -  polymicrobial, strep/enterococcus   - check ESR, CRP - elevated  - consult ID - cefepime, vanc, flagyl  - check expanded viral panel - negative  - MRI results reviewed with patient; he politely declines surgical intervention at EDW and wishes to f/u with his ortho doctor at [place] - has appointment next week   - wound care o/c   - was able to discuss with his  orthopedic MD from [place] who wrote the chapter on DFU management; he advised to discharge home with antibiotics when stable and they will arrange for follow up in clinic next week (appointment has already been set)  - d/w ID, await final culture results before deescalating abx prior to dc - adjust to augmentin BID for 2 weeks, has follow up with ortho on Tuesday      Mild Acute HFpEF exacerbation - resolved  CXR with pulmonary vascular congestion and edema  - check proBNP - 619  - Echo from 12/'24 with G1DD  - s/p a dose of IV lasix 40mg  - daily weights, I/Os  - f/u with his cardiologist as OP      DM2  - ISS/accucheks     Primary HTN  - amlodipine, losartan     Hyperlipidemia  - statin     Hx CVA  - asa, statin      Consults: IP CONSULT TO HOSPITALIST  CONSULT TO WOUND OSTOMY  IP CONSULT TO INFECTIOUS DISEASE  IP CONSULT TO PHARMACY  CONSULT TO WOUND OSTOMY    Operative Procedures:        Patient instructions:      I as the attending physician reconciled the current and discharge medications on day of discharge.     Current Discharge Medication List        START taking these medications    Details   amoxicillin clavulanate 875-125 MG Oral Tab Take 1 tablet by mouth 2 (two) times daily for 14 days.           CONTINUE these medications which have NOT CHANGED    Details   TRULICITY 1.5 MG/0.5ML Subcutaneous Solution Auto-injector Inject 1.5 mg into the skin once a week.      aspirin 325 MG Oral Tab Take 1 tablet (325 mg total) by mouth daily.      Multiple Vitamins-Minerals (ICAPS AREDS FORMULA) Oral Tab Take 1 each by mouth daily.      docusate sodium 100 MG Oral Cap Take 1 capsule (100 mg total) by mouth every morning.      rosuvastatin 20 MG Oral Tab Take 1 tablet (20 mg total) by mouth nightly.      omega-3 fatty acids 1000 MG Oral Cap Take 1,000 mg by mouth daily.      ferrous sulfate 325 (65 FE) MG Oral Tab EC Take 1 tablet (325 mg total) by mouth every other day.      magnesium oxide 400 MG Oral Tab Take 1  tablet (400 mg total) by mouth every other day.      Irbesartan 300 MG Oral Tab Take 1 tablet (300 mg total) by mouth daily.      amLODIPine Besylate 5 MG Oral Tab Take 1 tablet (5 mg total) by mouth every evening.      meclizine 25 MG Oral Tab Take 1 tablet (25 mg total) by mouth 3 (three) times daily as needed for Dizziness.      Insulin Lispro, 1 Unit Dial, (ADMELOG SOLOSTAR) 100 UNIT/ML Subcutaneous Solution Pen-injector INJECT 10 UNITS UNDER THE SKIN THREE TIMES DAILY AND ONCE DAILY PER SLIDING SCALE. UP TO 51 UNITS DAILY      insulin glargine (BASAGLAR KWIKPEN) 100 UNIT/ML Subcutaneous Solution Pen-injector Inject 40 Units into the skin nightly.      TRUEPLUS 5-BEVEL PEN NEEDLES 31G X 5 MM Does not apply Misc USE TO INJECT FIVE TIMES DAILY      Ondansetron HCl (ZOFRAN) 4 mg tablet Take 1 tablet (4 mg total) by mouth every 6 (six) hours as needed for Nausea.           STOP taking these medications       gabapentin 800 MG Oral Tab              Activity: activity as tolerated  Diet: regular diet  Wound Care: as directed  Code Status: Full Code      Discharge Exam:     General: no acute distress, alert and oriented x 3  Heart: RRR  Lungs: clear bilaterally, no active wheezing  Abdomen: nontender, nondistended, intact BS  Extremities: no pedal edema, R foot wrapped  Neuro: CN inact, no focal deficits      Total time coordinating care for discharge: Greater than 30 minutes    Glenn Perry MD  [facility] Hospitalist

## 2025-03-21 NOTE — PROGRESS NOTES
AVS reviewed, IV removed, will dc home & follow-up at [place] as scheduled, CD given of MRI's/Ct scans, will dc home when ride arrives at 6 pm , walker given, verbalized understanding of dc instructions.

## 2025-03-27 ENCOUNTER — APPOINTMENT (OUTPATIENT)
Dept: CARDIOLOGY | Age: 70
End: 2025-03-27
Attending: INTERNAL MEDICINE

## 2025-04-03 ENCOUNTER — OFFICE VISIT (OUTPATIENT)
Dept: CARDIOLOGY | Age: 70
End: 2025-04-03
Attending: INTERNAL MEDICINE

## 2025-04-03 VITALS
OXYGEN SATURATION: 99 % | SYSTOLIC BLOOD PRESSURE: 118 MMHG | HEART RATE: 48 BPM | BODY MASS INDEX: 33.5 KG/M2 | DIASTOLIC BLOOD PRESSURE: 58 MMHG | WEIGHT: 261 LBS | HEIGHT: 74 IN

## 2025-04-03 DIAGNOSIS — Z79.4 TYPE 2 DIABETES MELLITUS WITHOUT COMPLICATION, WITH LONG-TERM CURRENT USE OF INSULIN (CMD): ICD-10-CM

## 2025-04-03 DIAGNOSIS — E78.5 DYSLIPIDEMIA (HIGH LDL; LOW HDL): ICD-10-CM

## 2025-04-03 DIAGNOSIS — I35.0 NONRHEUMATIC AORTIC VALVE STENOSIS: Primary | ICD-10-CM

## 2025-04-03 DIAGNOSIS — E11.9 TYPE 2 DIABETES MELLITUS WITHOUT COMPLICATION, WITH LONG-TERM CURRENT USE OF INSULIN (CMD): ICD-10-CM

## 2025-04-03 PROCEDURE — 99211 OFF/OP EST MAY X REQ PHY/QHP: CPT

## 2025-04-03 PROCEDURE — 3074F SYST BP LT 130 MM HG: CPT | Performed by: INTERNAL MEDICINE

## 2025-04-03 PROCEDURE — 3078F DIAST BP <80 MM HG: CPT | Performed by: INTERNAL MEDICINE

## 2025-04-03 PROCEDURE — 99214 OFFICE O/P EST MOD 30 MIN: CPT | Performed by: INTERNAL MEDICINE

## 2025-04-03 RX ORDER — IRBESARTAN 300 MG/1
300 TABLET ORAL DAILY
Qty: 90 TABLET | Refills: 3 | Status: SHIPPED | OUTPATIENT
Start: 2025-04-03

## 2025-04-03 RX ORDER — ROSUVASTATIN CALCIUM 20 MG/1
TABLET, COATED ORAL
Qty: 90 TABLET | Refills: 3 | Status: SHIPPED | OUTPATIENT
Start: 2025-04-03

## 2025-04-03 RX ORDER — AMLODIPINE BESYLATE 5 MG/1
5 TABLET ORAL EVERY EVENING
Qty: 90 TABLET | Refills: 3 | Status: SHIPPED | OUTPATIENT
Start: 2025-04-03

## 2025-04-03 ASSESSMENT — ENCOUNTER SYMPTOMS
COUGH: 0
ALLERGIC/IMMUNOLOGIC COMMENTS: NO NEW FOOD ALLERGIES
CHILLS: 0
HEMATOCHEZIA: 0
WEIGHT LOSS: 0
HEMOPTYSIS: 0
SUSPICIOUS LESIONS: 0
BRUISES/BLEEDS EASILY: 0
FEVER: 0
WEIGHT GAIN: 0

## 2025-04-17 LAB
ALBUMIN SERPL-MCNC: 4.6 G/DL (ref 3.2–4.8)
ALBUMIN/GLOB SERPL: 1.2 {RATIO} (ref 1–2)
ALP LIVER SERPL-CCNC: 102 U/L (ref 45–117)
ALT SERPL-CCNC: 56 U/L (ref 10–49)
ANION GAP SERPL CALC-SCNC: 9 MMOL/L (ref 0–18)
AST SERPL-CCNC: 32 U/L (ref ?–34)
BASOPHILS # BLD AUTO: 0.05 X10(3) UL (ref 0–0.2)
BASOPHILS NFR BLD AUTO: 0.5 %
BILIRUB SERPL-MCNC: 0.6 MG/DL (ref 0.2–1.1)
BUN BLD-MCNC: 17 MG/DL (ref 9–23)
CALCIUM BLD-MCNC: 9.8 MG/DL (ref 8.7–10.6)
CHLORIDE SERPL-SCNC: 102 MMOL/L (ref 98–112)
CO2 SERPL-SCNC: 29 MMOL/L (ref 21–32)
CREAT BLD-MCNC: 1.02 MG/DL (ref 0.7–1.3)
EGFRCR SERPLBLD CKD-EPI 2021: 80 ML/MIN/1.73M2 (ref 60–?)
EOSINOPHIL # BLD AUTO: 0.2 X10(3) UL (ref 0–0.7)
EOSINOPHIL NFR BLD AUTO: 1.9 %
ERYTHROCYTE [DISTWIDTH] IN BLOOD BY AUTOMATED COUNT: 15 %
GLOBULIN PLAS-MCNC: 3.7 G/DL (ref 2–3.5)
GLUCOSE BLD-MCNC: 95 MG/DL (ref 70–99)
HCT VFR BLD AUTO: 38.7 % (ref 39–53)
HGB BLD-MCNC: 12.5 G/DL (ref 13–17.5)
IMM GRANULOCYTES # BLD AUTO: 0.04 X10(3) UL (ref 0–1)
IMM GRANULOCYTES NFR BLD: 0.4 %
LYMPHOCYTES # BLD AUTO: 1.29 X10(3) UL (ref 1–4)
LYMPHOCYTES NFR BLD AUTO: 12.5 %
MCH RBC QN AUTO: 27.6 PG (ref 26–34)
MCHC RBC AUTO-ENTMCNC: 32.3 G/DL (ref 31–37)
MCV RBC AUTO: 85.4 FL (ref 80–100)
MONOCYTES # BLD AUTO: 0.66 X10(3) UL (ref 0.1–1)
MONOCYTES NFR BLD AUTO: 6.4 %
NEUTROPHILS # BLD AUTO: 8.05 X10 (3) UL (ref 1.5–7.7)
NEUTROPHILS # BLD AUTO: 8.05 X10(3) UL (ref 1.5–7.7)
NEUTROPHILS NFR BLD AUTO: 78.3 %
OSMOLALITY SERPL CALC.SUM OF ELEC: 291 MOSM/KG (ref 275–295)
PLATELET # BLD AUTO: 184 10(3)UL (ref 150–450)
POTASSIUM SERPL-SCNC: 4 MMOL/L (ref 3.5–5.1)
PROT SERPL-MCNC: 8.3 G/DL (ref 5.7–8.2)
RBC # BLD AUTO: 4.53 X10(6)UL (ref 3.8–5.8)
SODIUM SERPL-SCNC: 140 MMOL/L (ref 136–145)
TROPONIN I SERPL HS-MCNC: 48 NG/L (ref ?–53)
WBC # BLD AUTO: 10.3 X10(3) UL (ref 4–11)

## 2025-04-17 PROCEDURE — 85025 COMPLETE CBC W/AUTO DIFF WBC: CPT | Performed by: STUDENT IN AN ORGANIZED HEALTH CARE EDUCATION/TRAINING PROGRAM

## 2025-04-17 PROCEDURE — 99285 EMERGENCY DEPT VISIT HI MDM: CPT

## 2025-04-17 PROCEDURE — S0119 ONDANSETRON 4 MG: HCPCS

## 2025-04-17 PROCEDURE — 84484 ASSAY OF TROPONIN QUANT: CPT | Performed by: EMERGENCY MEDICINE

## 2025-04-17 PROCEDURE — 96375 TX/PRO/DX INJ NEW DRUG ADDON: CPT

## 2025-04-17 PROCEDURE — 93005 ELECTROCARDIOGRAM TRACING: CPT

## 2025-04-17 PROCEDURE — 85025 COMPLETE CBC W/AUTO DIFF WBC: CPT

## 2025-04-17 PROCEDURE — 80053 COMPREHEN METABOLIC PANEL: CPT

## 2025-04-17 PROCEDURE — 93010 ELECTROCARDIOGRAM REPORT: CPT

## 2025-04-17 PROCEDURE — 80053 COMPREHEN METABOLIC PANEL: CPT | Performed by: STUDENT IN AN ORGANIZED HEALTH CARE EDUCATION/TRAINING PROGRAM

## 2025-04-17 PROCEDURE — 96374 THER/PROPH/DIAG INJ IV PUSH: CPT

## 2025-04-17 RX ORDER — MECLIZINE HYDROCHLORIDE 25 MG/1
25 TABLET ORAL ONCE
Status: DISCONTINUED | OUTPATIENT
Start: 2025-04-17 | End: 2025-04-18

## 2025-04-17 RX ORDER — ONDANSETRON 4 MG/1
TABLET, ORALLY DISINTEGRATING ORAL
Status: COMPLETED
Start: 2025-04-17 | End: 2025-04-17

## 2025-04-17 RX ORDER — ONDANSETRON 4 MG/1
4 TABLET, ORALLY DISINTEGRATING ORAL ONCE
Status: COMPLETED | OUTPATIENT
Start: 2025-04-17 | End: 2025-04-17

## 2025-04-18 ENCOUNTER — HOSPITAL ENCOUNTER (EMERGENCY)
Facility: HOSPITAL | Age: 70
Discharge: HOME | End: 2025-04-18
Attending: STUDENT IN AN ORGANIZED HEALTH CARE EDUCATION/TRAINING PROGRAM
Payer: MEDICAID

## 2025-04-18 ENCOUNTER — APPOINTMENT (OUTPATIENT)
Dept: CT IMAGING | Facility: HOSPITAL | Age: 70
End: 2025-04-18
Attending: EMERGENCY MEDICINE
Payer: MEDICAID

## 2025-04-18 ENCOUNTER — APPOINTMENT (OUTPATIENT)
Dept: MRI IMAGING | Facility: HOSPITAL | Age: 70
End: 2025-04-18
Attending: STUDENT IN AN ORGANIZED HEALTH CARE EDUCATION/TRAINING PROGRAM
Payer: MEDICAID

## 2025-04-18 VITALS
HEIGHT: 74 IN | BODY MASS INDEX: 32.73 KG/M2 | TEMPERATURE: 98 F | DIASTOLIC BLOOD PRESSURE: 73 MMHG | RESPIRATION RATE: 14 BRPM | HEART RATE: 80 BPM | SYSTOLIC BLOOD PRESSURE: 143 MMHG | OXYGEN SATURATION: 100 % | WEIGHT: 255 LBS

## 2025-04-18 DIAGNOSIS — R42 VERTIGO: Primary | ICD-10-CM

## 2025-04-18 DIAGNOSIS — H81.10 BENIGN PAROXYSMAL POSITIONAL VERTIGO, UNSPECIFIED LATERALITY: ICD-10-CM

## 2025-04-18 LAB
Q-T INTERVAL: 452 MS
QRS DURATION: 150 MS
QTC CALCULATION (BEZET): 420 MS
R AXIS: -40 DEGREES
T AXIS: 76 DEGREES
VENTRICULAR RATE: 52 BPM

## 2025-04-18 PROCEDURE — 70450 CT HEAD/BRAIN W/O DYE: CPT | Performed by: EMERGENCY MEDICINE

## 2025-04-18 PROCEDURE — 70551 MRI BRAIN STEM W/O DYE: CPT | Performed by: STUDENT IN AN ORGANIZED HEALTH CARE EDUCATION/TRAINING PROGRAM

## 2025-04-18 RX ORDER — ONDANSETRON 2 MG/ML
4 INJECTION INTRAMUSCULAR; INTRAVENOUS ONCE
Status: COMPLETED | OUTPATIENT
Start: 2025-04-18 | End: 2025-04-18

## 2025-04-18 RX ORDER — MECLIZINE HYDROCHLORIDE 25 MG/1
25 TABLET ORAL ONCE
Status: COMPLETED | OUTPATIENT
Start: 2025-04-18 | End: 2025-04-18

## 2025-04-18 RX ORDER — MECLIZINE HYDROCHLORIDE 25 MG/1
25 TABLET ORAL 3 TIMES DAILY PRN
Qty: 20 TABLET | Refills: 0 | Status: SHIPPED | OUTPATIENT
Start: 2025-04-18

## 2025-04-18 RX ORDER — DIPHENHYDRAMINE HYDROCHLORIDE 50 MG/ML
12.5 INJECTION, SOLUTION INTRAMUSCULAR; INTRAVENOUS ONCE
Status: COMPLETED | OUTPATIENT
Start: 2025-04-18 | End: 2025-04-18

## 2025-04-18 RX ORDER — ONDANSETRON 4 MG/1
4 TABLET, ORALLY DISINTEGRATING ORAL EVERY 8 HOURS PRN
Qty: 20 TABLET | Refills: 0 | Status: SHIPPED | OUTPATIENT
Start: 2025-04-18 | End: 2025-04-25

## 2025-04-18 RX ORDER — ONDANSETRON 2 MG/ML
INJECTION INTRAMUSCULAR; INTRAVENOUS
Status: COMPLETED
Start: 2025-04-18 | End: 2025-04-18

## 2025-04-18 RX ORDER — LORAZEPAM 2 MG/ML
1 INJECTION INTRAMUSCULAR ONCE
Status: COMPLETED | OUTPATIENT
Start: 2025-04-18 | End: 2025-04-18

## 2025-04-18 RX ORDER — METOCLOPRAMIDE HYDROCHLORIDE 5 MG/ML
10 INJECTION INTRAMUSCULAR; INTRAVENOUS ONCE
Status: COMPLETED | OUTPATIENT
Start: 2025-04-18 | End: 2025-04-18

## 2025-04-18 NOTE — ED PROVIDER NOTES
Received signout from partner pending MRI to rule out acute stroke.  Patient was given Ativan for vertigo prior to MRI    MRI read by vision rad radiology shows no acute infarct.  Small area of encephalomalacia in the right subinsular region with associated susceptibility artifact.    Patient reexamined at 5:35 AM.  Feeling better.  No vertigo.  Given a second dose of meclizine.  Patient was ambulatory.  Will be given meclizine and Zofran for home.  Follow-up with primary physician and ENT

## 2025-04-18 NOTE — ED INITIAL ASSESSMENT (HPI)
Pt wheeled into ED c/o dizziness/nausea/vomiting that started this evening, states he has had similar episodes to this in the past, patient actively vomiting in triage hartman, denies chest pain/JOAQUINA/visual changes/weakness

## 2025-04-18 NOTE — DISCHARGE INSTRUCTIONS
May take meclizine for dizziness    Zofran for nausea/vomiting    Your MRI shows no sign of stroke

## 2025-04-18 NOTE — ED QUICK NOTES
Pt transported to MRI. Sleepy, but oriented and arousable. Denies dizziness. Still reports some nausea but tolerating lying position at this time. MRI tech told to call RN with any concerns.

## 2025-04-18 NOTE — ED PROVIDER NOTES
Patient Seen in: [facility] Emergency Department      History     Chief Complaint   Patient presents with    Dizziness     N/V vertigo     Stated Complaint: dizziness, n/v    Subjective:   HPI    The patient is a pleasant 70-year-old male presented to the emergency room with reports of dizziness.  Symptoms developed this evening and he states that he feels like he is spinning around.  Symptoms are worse with moving around and he notes that he has had 1 episode of vomiting.  He feels most comfortable when his eyes are closed and when he looks down.  He states he has had this happen to him about 2 times before and he was seen in our hospital and given medicine for it in the past.  He denies any changes in his vision speech or any weakness in any of his extremities.  He also denies any chest pain or shortness of breath.    I have reviewed the chart and I see the patient was seen in her ER August 2022 and presented with similar symptoms of dizziness.  It appears he was given meclizine now admitted had resolution of his symptoms after meclizine and was ultimately discharged by the hospitalist with meclizine.    Objective:     Past Medical History:    Anxiety state    Cellulitis    Cellulitis and abscess of foot, except toes    Charcot foot due to diabetes mellitus (HCC)    Charcot's joint disease due to secondary diabetes    Chronic osteomyelitis of left tibia with draining sinus (Formerly [facility])    Essential hypertension    Exposed orthopaedic hardware    Lt foot after Charcot joint surgery revision.  Hardware removed 2018    Heart murmur    High blood pressure    High cholesterol    Hypertriglyceridemia    Hypokalemia    Hypokalemia    Methicillin susceptible Staphylococcus aureus infection    Neuropathy    Obesity, unspecified    Osteoarthrosis, unspecified whether generalized or localized, unspecified site    Osteomyelitis of ankle or foot, acute (Formerly [facility])    post-surgical,  at [facility]    Osteomyelitis of ankle or foot,  acute, left (HCC)    OSTEOPENIA    Other and unspecified hyperlipidemia    RLS (restless legs syndrome)    Stroke (HCC)    Type II or unspecified type diabetes mellitus without mention of complication, not stated as uncontrolled    Visual impairment    reading glasses              Past Surgical History:   Procedure Laterality Date    Colonoscopy N/A 1/24/2024    Procedure: COLONOSCOPY with cold snare polypectomy x4 and endoclip x1;  Surgeon: Joey Adams MD;  Location:  ENDOSCOPY    Hemorrhoidectomy      Open rx patella fx      Open rx periartic fx/disloc elbow      Other surgical history  2011, 2012    bilateral foot surgery for Charcot joint    Tonsillectomy  14 y/o                Social History     Socioeconomic History    Marital status: Single   Tobacco Use    Smoking status: Never     Passive exposure: Never    Smokeless tobacco: Never   Vaping Use    Vaping status: Never Used   Substance and Sexual Activity    Alcohol use: No    Drug use: No     Social Drivers of Health     Food Insecurity: No Food Insecurity (3/24/2025)    Received from [facility]    Hunger Vital Sign     Worried About Running Out of Food in the Last Year: Never true     Ran Out of Food in the Last Year: Never true   Transportation Needs: Unmet Transportation Needs (3/24/2025)    Received from [facility]    PRAPARE - Transportation     Lack of Transportation (Medical): No     Lack of Transportation (Non-Medical): Yes   Housing Stability: Unknown (3/24/2025)    Received from [facility]    Housing Stability Vital Sign     Unable to Pay for Housing in the Last Year: No                                Physical Exam     ED Triage Vitals [04/17/25 2229]   BP (!) 172/75   Pulse 84   Resp 16   Temp 97.5 °F (36.4 °C)   Temp src Oral   SpO2 97 %   O2 Device None (Room air)       Current Vitals:   Vital Signs  BP: 143/73  Pulse: 80  Resp: 14  Temp: 97.5 °F (36.4 °C)  Temp src:  Oral  MAP (mmHg): 89    Oxygen Therapy  SpO2: 100 %  O2 Device: None (Room air)        Physical Exam  Vitals and nursing note reviewed.   Constitutional:       General: He is not in acute distress.     Appearance: Normal appearance.      Comments: Sitting in the room with his eyes closed and head down   HENT:      Head: Normocephalic.      Nose: Nose normal.      Mouth/Throat:      Mouth: Mucous membranes are moist.   Eyes:      General: No visual field deficit.     Extraocular Movements: Extraocular movements intact.      Pupils: Pupils are equal, round, and reactive to light.   Cardiovascular:      Rate and Rhythm: Normal rate and regular rhythm.      Pulses: Normal pulses.   Pulmonary:      Effort: Pulmonary effort is normal.      Breath sounds: Normal breath sounds.   Abdominal:      General: Abdomen is flat. Bowel sounds are normal. There is no distension.      Palpations: Abdomen is soft.      Tenderness: There is no abdominal tenderness. There is no right CVA tenderness, left CVA tenderness, guarding or rebound.      Hernia: No hernia is present.   Musculoskeletal:         General: No swelling or tenderness. Normal range of motion.      Cervical back: Normal range of motion.   Skin:     General: Skin is warm and dry.   Neurological:      Mental Status: He is alert and oriented to person, place, and time. Mental status is at baseline.      Cranial Nerves: No cranial nerve deficit, dysarthria or facial asymmetry.      Sensory: No sensory deficit.      Motor: No weakness.   Psychiatric:         Mood and Affect: Mood normal.           Physical Exam                ED Course     Labs Reviewed   CBC WITH DIFFERENTIAL WITH PLATELET - Abnormal; Notable for the following components:       Result Value    HGB 12.5 (*)     HCT 38.7 (*)     Neutrophil Absolute Prelim 8.05 (*)     Neutrophil Absolute 8.05 (*)     All other components within normal limits   COMP METABOLIC PANEL (14) - Abnormal; Notable for the following  components:    ALT 56 (*)     Total Protein 8.3 (*)     Globulin  3.7 (*)     All other components within normal limits   TROPONIN I HIGH SENSITIVITY - Normal   RAINBOW DRAW LAVENDER   RAINBOW DRAW LIGHT GREEN   RAINBOW DRAW BLUE     EKG    Rate, intervals and axes as noted on EKG Report.  Rate: 52  Rhythm: Sinus Rhythm  Reading: Patient has a wide QRS which has been present on prior EKGs and morphology of overall EKG appears similar to EKG from 12/24 ; however prior EKG very bradycardic              Results            MDM        Medical Decision Making    The differential includes the following  Benign peripheral vertigo, intracranial hemorrhage versus a life threat, have also considered CVA, metabolic derangement, cardiac arrhythmia    Pertinent comorbidities include  As detailed above    Pertinent social history includes  As detailed above    Labs  Metabolic panel only notable for ALT 56, CBC with a white blood cell count of 10.3 hemoglobin of 12.5    Imaging studies  I reviewed the images and my independent interpreation after review is no evidence of intracranial hemorrhage.     External data reviewed    Discussion of management with external providers    ER course  On arrival here patient hypertensive but otherwise hemodynamically stable.  On his neurological exam he has no focal neurological deficits with NIH is 0.  He does appear to have increased dizziness with changes in position and rapid eye movement.  Patient had an episode of vomiting.  IV established and he has been given Zofran.  He noted he felt somewhat better.  He has just been given meclizine and has been sent off for head CT.      CT reviewed by myself without any evidence of intracranial hemorrhage.  Patient returned vomiting.  Given age and comorbidities we will obtain an MRI.  He has been signed out to overnight physician to follow-up MRI results and reassess.  Disposition and Plan     Clinical Impression:  1. Vertigo    2. Benign paroxysmal  positional vertigo, unspecified laterality         Disposition:  Discharge  4/18/2025  5:34 am    Follow-up:  Randall Almendarez MD  [address] [place] [address] [phone]    Follow up  As needed    Diamante Odom MD  [address] [place] [address] [phone]    Follow up in 1 week(s)            Medications Prescribed:  Discharge Medication List as of 4/18/2025  6:07 AM        START taking these medications    Details   !! meclizine 25 MG Oral Tab Take 1 tablet (25 mg total) by mouth 3 (three) times daily as needed for Dizziness., Normal, Disp-20 tablet, R-0      ondansetron 4 MG Oral Tablet Dispersible Take 1 tablet (4 mg total) by mouth every 8 (eight) hours as needed for Nausea., Normal, Disp-20 tablet, R-0       !! - Potential duplicate medications found. Please discuss with provider.          Supplementary Documentation:

## 2025-04-18 NOTE — ED QUICK NOTES
Sister dropped pt off. She said to call her if he needed anything. Pt awake and alert at this time in waiting room.

## 2025-06-18 NOTE — OPERATIVE REPORT
PATIENT NAME: Delmar Hernández  MRN: [id number]  DATE OF OPERATION: 1/24/2024  REFERRING PHYSICIAN: Dr. Almendarez  Medications:  Versed 8 mg IVP              Fentanyl 100 mcg IVP  DATE OF LAST COLONOSCOPY:  none  PREOPERATIVE DIAGNOSIS                Cologuard positive stool  POSTOPERATIVE DIAGNOSIS:  8 mm sessile ascending colon polyp removed via cold snare.  The polypectomy site was closed with placement of a single endoclip to prevent post polypectomy bleeding.  7 mm transverse colon polyp was removed via cold snare.  6 mm and 3 mm sigmoid colon polyp removed via cold snare.    PROCEDURE PERFORMED:               Colonoscopy with cold snare polypectomy and endoclip placement  Endoscopist:                                             Joey Adams MD     PROCEDURE AND FINDINGS: The patient was placed into the left lateral decubitus after informed consent was obtained.  All questions were answered.  An updated history and physical were performed and an ASA score of 2 was assigned.  Informed consent was obtained prior to the procedure, with review of possible complications including bleeding, infection, and missed polyps and or cancer.  IV sedation was administered.    A digital rectal exam was performed and was normal.  The video adult colonoscope was passed from anus to cecum.  The ileocecal valve, appendiceal orfice, hepatic and splenic flexures were all well visualized.  The bowel preparation was rated on the Aronchick bowel prep scale as 2. (1 - excellent > 95% mucosa seen; 2 - good - clear liquid up to 25% of the mucosa, 90% mucosa seen;  3 - fair - semisolid stool not suctioned, but 90% of the mucosa seen; 4 - poor - semisolid stool not suctioned, but < 90% mucosa seen; 5 - inadequate - repeat prep needed) .               Findings included an 8 mm sessile ascending colon polyp removed via cold snare.  The polypectomy site was closed with placement of a single endoclip to prevent post polypectomy bleeding.  A 7 mm  PEG tube successfully placed 6/18 by Dr. Quan.  Okay to use with water flushes/meds immediately.  Please wait till 1640 to use for tube feeds.  Recommend using a binder to prevent displacement.  Nursing to manage site; keeping it clean/dry.      There was also some dilation in the GEJ.      Will sign off.   Please call the GI service if there are further needs.  Thank you.   transverse colon polyp was removed via cold snare.  6 mm and 3 mm sigmoid colon polyp removed via cold snare.  On retroflexion of the scope in the anorectum, normal internal hemorrhoids were noted.     The scope withdrawal from cecum to anus was 18 minutes.   A trained sedation nurse was present to assist in monitoring the patient during the entire length of moderate sedation time.   Total moderate sedation time was 47 minutes.    RECOMMENDATIONS         1. The patient will be provided with a written summary of today's endoscopic findings.        2. Do not take aspirin or any other aspirin-like medication for 10 days after the procedure.         3. The patient will be notified with biopsy results in 2 - 4 working days.         4. Recommendations regarding repeat colonoscopy will be made once the biopsy results are reviewed.      Joey Adams MD, Gastroenterologist  Cc: Dr. Almendarez

## (undated) DEVICE — 3M™ RED DOT™ MONITORING ELECTRODE WITH FOAM TAPE AND STICKY GEL, 50/BAG, 20/CASE, 72/PLT 2570: Brand: RED DOT™

## (undated) DEVICE — 10FT COMBINED O2 DELIVERY/CO2 MONITORING. FILTER WITH MICROSTREAM TYPE LUER: Brand: DUAL ADULT NASAL CANNULA

## (undated) DEVICE — LASSO POLYPECTOMY SNARE: Brand: LASSO

## (undated) DEVICE — KIT CUSTOM ENDOPROCEDURE STERIS

## (undated) DEVICE — 1200CC GUARDIAN II: Brand: GUARDIAN

## (undated) DEVICE — TRAP POLYP W/ 2 SPEC TY CLR MAGNIFYING WIND

## (undated) DEVICE — KIT VLV 5 PC AIR H2O SUCT BX ENDOGATOR CONN

## (undated) NOTE — IP AVS SNAPSHOT
Patient Demographics     Address  [address] [place] [id number] Phone  [phone] [facility])  [phone] (Mobile) *Preferred* E-mail Address  [email or web address]      Emergency Contact(s)     Name Relation Home Work [address] [place] [address] Sister Next dose due: Take next dose tonight, 4/24/20      Take 100 mg by mouth 2 (two) times daily. Fabien Sue MD         gabapentin 800 MG Tabs  Commonly known as:  NEURONTIN  Next dose due:   Take next dose tonight, 4/24/20      TAKE 1 TABLET(80 [id number] valsartan (DIOVAN) tab 320 mg 04/24/20 0950 Given            LEFT UPPER ARM     Order ID Medication Name Action Time Action Reason Comments    [id number] Insulin Aspart Pen (NOVOLOG) 100 UNIT/ML flexpen 1-5 Units 04/24/20 0951 Given            RI GFR, -American 100 >=60 [facility])            MAGNESIUM [[id number]] (Normal)  Resulted: 04/24/20 0545, Result status: Final result   Ordering provider:  KARLOS Bowles  04/23/20 2300 Resulting lab:  [address] [facility] • Charcot foot due to diabetes mellitus ([facility] [address] 11/14/2018   • Charcot's joint disease due to secondary diabetes    • Chronic osteomyelitis of left tibia with draining sinus ([facility] 3/5/2018   • Essential hypertension 8/29/2011   • Exposed orthopaedic hardware MEALS, Disp: 180 tablet, Rfl: 0  VALSARTAN 320 MG Oral Tab, TAKE 1 TABLET(320 MG) BY MOUTH DAILY, Disp: 90 tablet, Rfl: 1  ASPIRIN 325 MG OR TABS, Take 325 mg by mouth daily. , Disp: , Rfl:   MULTIVITAMINS OR TABS, Take 1 tablet by mouth daily.   , Disp: , 2317 04/22/20 0442   WBC 10.3 10.5   HGB 13.1 12.7*   MCV 81.4 80.7   .0 205.0   NE 7.10  --    LYMABS 1.74  --    INR 1.08  --        Recent Labs   Lab 04/21/20 2317 04/22/20 0442    135*   K 4.1 3.8    103   CO2 30.0 29.0   BUN 18 1 through the brain. Dose reduction techniques were used. Dose information is transmitted to the [employer] [facility]) NRDR ([address]) which includes the Dose Index Registry.   PATIENT STATED HISTORY: (As transcribed by T lobar pneumonia or overt congestive failure.     Dictated by: Jam Arreguin MD on 4/21/2020 at 11:48 PM     Finalized by: Jam Arreguin MD on 4/21/2020 at 11:49 PM                 ASSESSMENT / PLAN:     # Acute R basal ganglia hematoma with associated n Location [address] [place] [address] Attending Chio Whiteside, [address] [facility] Day # 1 PCP Grover Manjarrez MD     Patient Identification  Manish Flower is a 60year old male.   :  1955  Admit Date:  2020  Attending Provider:  Chio Whiteside DO consult obtained now to assess pt's funtional status and make appropriate recommendations. He typically works as a  and lives with his sister who works full-time.     Premorbid Functional Status: Patient was independent with mobility/ambulation insulin detemir (LEVEMIR) 100 UNIT/ML flextouch 30 Units, 30 Units, Subcutaneous, Nightly  glucose (DEX4) oral liquid 15 g, 15 g, Oral, Q15 Min PRN    Or  Glucose-Vitamin C (DEX-4) chewable tab 4 tablet, 4 tablet, Oral, Q15 Min PRN    Or  dextrose 50 % inj Insulin Aspart Pen (NOVOLOG) 100 UNIT/ML flexpen 5 Units, 5 Units, Subcutaneous, TID CC        Social History    Tobacco Use      Smoking status: Never Smoker      Smokeless tobacco: Never Used    Alcohol use: No      Family History   Problem Relation Age Radial and pedal pulses good. No cyanosis in all extremities. Abdomen:   No tenderness guarding or rigidity. Liver and spleen are not enlarged. Bowel sounds present. Musculoskeletal:     Right Upper Extremity:  Strength is 5. ROM WNL. Physiatric medical oversight to monitor kidney function, cardiac function, pulmonary status, neurologic status, DVT prevention. Estimated length of stay in recommended rehabilitation level of care is two weeks.       The patient has good Therapy significant imaging (date, test, result):   4/21/20, CT brain:  1. Acute parenchymal hematoma centered in the right basal ganglia as above.   2. Minimal age-indeterminate microvascular ischemic changes in the cerebral white matter.      Therapy sign • OPEN RX PERIARTIC FX/DISLOC ELBOW     • OTHER SURGICAL HISTORY  2011, 2012    bilateral foot surgery for Charcot joint   • TONSILLECTOMY  14 y/o[BR.2]       SUBJECTIVE  \" I just have to be able to  tandem stand for pictures. \"    Patient’s self- Comment : Above grades based upon FIM definitions per department policy[BR.2]    Skilled Therapy Provided: The writer had surgical mask and gloves donned throughout session.   Pt amb 150' without AD but pt prefers to ambulate close to wall rail in the hallw rolling at assistance level: supervision      Goal #4     Goal #5     Goal #6     Goal Comments: Goals established on 4/22/2020[BR.1]       Attribution Pena    BR. 1 - Sarai Welch, PTA on 4/24/2020  1:59 PM  BR. 2 - Desean Welch, PTA on 4/24/2020 • Charcot foot due to diabetes mellitus ([facility] [address] 11/14/2018   • Charcot's joint disease due to secondary diabetes    • Chronic osteomyelitis of left tibia with draining sinus ([facility] 3/5/2018   • Essential hypertension 8/29/2011   • Exposed orthopaedic hardware AM-PAC '6-Clicks' INPATIENT SHORT FORM - BASIC MOBILITY  How much difficulty does the patient currently have. ..[BR.1]  -   Turning over in bed (including adjusting bedclothes, sheets and blankets)?: A Little   -   Sitting down on and rolando PT Treatment Plan: Bed mobility; Endurance; Energy conservation;Patient education;Gait training;Neuromuscular re-educate;Strengthening;Transfer training;Balance training  Rehab Potential : Good  Frequency (Obs): (6x/week)[BR.2]    CURRENT GOALS     Goal #1 P 2. Minimal age-indeterminate microvascular ischemic changes in the cerebral white matter.      Therapy significant co-morbidities:  Charcot foot to bilateral plantar feet (L>R), L heel wound, HTN, obesity, DMII, LE neuropathy        Problem List[NL.1]  Prin Patient Regularly Uses: Reading glasses[NL.2]    Prior Level of [place]: The pt reports that typically he is independent with ambulation and I/ADL's. 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     SUBJECTIVE  \"I'm all for whatever I ne RLE ASSESSMENT   RLE AROM  R Hip Flexion: 3/4 - Full ROM   R Knee Flexion: 3/4 - Full ROM   R Knee Extension: 3/4 - Full ROM   R Ankle Dorsiflexion:  0 - 1/2 AROM  R Ankle Plantar Flexion: 0 - 1/2 AROM   RLE PROM     RLE Strength  R Iliopsoas: 5/5   R Quad AM-PAC Score:[NL.1]  Raw Score: 17   Approx Degree of Impairment: 50.57%   Standardized Score (AM-PAC Scale): 42.13   CMS Modifier (G-Code): CK[NL.2]    FUNCTIONAL ABILITY STATUS  Gait Assessment[NL.1]   Gait Assistance: Minimum assistance  Distance (ft): independent bed mobility, transfers, and gait. The patient is below baseline and would benefit from skilled inpatient PT to address the above deficits to assist patient in returning to prior to level of function.   Functional outcome measures completed inc Therapist    Filed:  4/22/2020  3:44 PM Date of Service:  4/22/2020  2:19 PM Status:  Signed    :  Kecia Jewell PT (Physical Therapist)    Related Notes:  Addendum by Kecia Jewlel PT (Physical Therapist) filed at 4/22/2020  3:57 PM         [facility] • Osteoarthrosis, unspecified whether generalized or localized, unspecified site    • Osteomyelitis of ankle or foot, acute ([facility] [address] 2/2012    post-surgical,  at [facility] [place], operated by [facility]   • Osteomyelitis of ankle or foot, acute, left ([facility] [address] 8/1/2017   • OSTEOPENIA    • Other a · Awareness of Errors:  assistance required to identify errors made, assistance required to correct errors made and decreased awareness of errors   · Awareness of Deficits:  decreased awareness of deficits    RANGE OF MOTION AND STRENGTH ASSESSMENT  RUE AS ADDITIONAL TESTS[NL.1]  Additional Tests: Modified [place]              Modified [place]: 4[NL.2]                  NEUROLOGICAL FINDINGS[NL.1]  Neurological Findings: Coordination - Finger to Nose  Coordination - Finger to Nose: Left decreased speed;Left dec Exercise/Education Provided:  Functional activity tolerated  Gait training  Transfer training[NL.1]    Patient End of Session: Up in chair;Needs met;Call light within reach;RN aware of session/findings; All patient questions and concerns addressed[NL.2] training;Balance training  Rehab Potential : Good  Frequency (Obs): (6x/week)  Number of Visits to Meet Established Goals: 5[NL.2]      CURRENT GOALS    Goal #1 Patient is able to demonstrate supine - sit EOB @ level: supervision     Goal #2 Patient is abl HTN, RLS.    Patient reports he has been receiving with wound care and is s/p 6 week casting to LLE which was removed on Friday 4/17 4/22: SBP parameter : keep at 160 or lower    Problem List  Principal Problem:    Hemorrhagic cerebrovascular accident (CVA Pt states \"This is the hard part, getting the strap through the loop\" RE managing L boot    Patient self-stated goal is to go to rehab and get intensive therapy, to get better as soon as possible.      OBJECTIVE  Precautions: Seizure(LLE surgical shoe for Patient End of Session: Up in chair;Needs met;Call light within reach;RN aware of session/findings; All patient questions and concerns addressed    ASSESSMENT   Pt is progressing towards OT goals.  Pt presents with deficits in coordination, balance, use of a Author:  Valeria Claire OT Service:  Angela Villagomez Author Type:  Occupational Therapist    Filed:  4/23/2020  2:43 PM Date of Service:  4/23/2020  2:29 PM Status:  Signed    :  Valeria Claire OT (Occupational Therapist)       OCCUPATIONAL THERAPY TREATMENT NOTE - I • Essential hypertension 8/29/2011   • Exposed orthopaedic hardware ([address] 2/19/2018    Lt foot after Charcot joint surgery revision.   Hardware removed 2018   • Hypertriglyceridemia    • Hypokalemia    • Hypokalemia    • Methicillin susceptible Staphylococc -   Toileting, which includes using toilet, bedpan or urinal? : A Little  -   Putting on and taking off regular upper body clothing?: A Little  -   Taking care of personal grooming such as brushing teeth?: A Little  -   Eating meals?: None[SH.2]    AM-PAC This scoring reflects deficiency for in-hand manipulation skills which could adversely affect Gross and Fine Motor Coordination efforts in all ADL/IADL tasks such as dressing, bathing, grooming, driving and hygiene.      Results of the hand held dynamometer Occupational Therapy Note signed by Pedro Johnson OT at 4/22/2020  3:25 PM  Version 1 of 1    Author:  Pedro Johnson OT Service:  Rehab Author Type:  Occupational Therapist    Filed:  4/22/2020  3:25 PM Date of Service:  4/22/2020  3:00 PM Status PAIN ASSESSMENT  Ratin  Location: (R sided HA, nurse aware)  Management Techniques: Relaxation    COGNITION[MM.1]  Overall Cognitive Status:  WFL - within functional limits  Awareness of Errors:  assistance required to identify errors made  Awareness o -   Putting on and taking off regular upper body clothing?: A Little  -   Taking care of personal grooming such as brushing teeth?: None  -   Eating meals?: None    AM-PAC Score:  Score: 20  Approx Degree of Impairment: 38.32%  Standardized Score (AM-PAC S opening/closing snap fasteners to increase LUE dexterity/strength. \[MM.2]Patient End of Session: Up in chair;Needs met;Call light within reach;RN aware of session/findings; All patient questions and concerns addressed; Alarm set    ASSESSMENT     Patien Client Assessment/Performance Deficits[MM. 1] MODERATE - Comorbidities and min to mod modifications of tasks[MM. 2]    Clinical Decision Making[MM. 1] MODERATE - Analysis of occupational profile, detailed assessments, several treatment options[MM. 2]    Overal :  Ifeoma Ferrer (Speech and Language Pathologist)       SPEECH/LANGUAGE/COGNITIVE EVALUATION & DYSPHAGIA FOLLOW UP- INPATIENT    Admission Date: 4/21/2020  Evaluation Date: 04/23/20    Reason for Referral: Stroke protocol    ASSESSMENT & PLAN Paranasal sinus, mastoid air cells and orbits are unremarkable. No extra-axial fluid collection. CONCLUSION:  Stable hematoma centered over the right basal ganglia and insular region.         Dictated by: Darby Larkin MD on 4/22

## (undated) NOTE — LETTER
[facility] [address], [address]  Consent for Procedure/Sedation    Date:     Time:       1.  I authorize the performance upon Adithya Elkins the following:  TRANSESOPHAGEAL ECHOCARDIOGRAM     2. I authorize Dr. Toby Grider (and whomever 4/9/1955       Medical Record #: [id number]

## (undated) NOTE — IP AVS SNAPSHOT
[address]            (For Outpatient Use Only) Initial Admit Date: 4/21/2020   Inpt/Obs Admit Date: Inpt: 4/22/20 / Obs: N/A   Discharge Date:    Chelsea Hammond:  [de-identified]   MRN: [de-identified]   CSN: [id number]   [address]: [id number] Subscriber ID:  Pt Rel to Subscriber:    Hospital Account Financial Class: Medicaid Advantage    April 24, 2020